# Patient Record
Sex: MALE | Race: BLACK OR AFRICAN AMERICAN | NOT HISPANIC OR LATINO
[De-identification: names, ages, dates, MRNs, and addresses within clinical notes are randomized per-mention and may not be internally consistent; named-entity substitution may affect disease eponyms.]

---

## 2017-04-05 ENCOUNTER — APPOINTMENT (OUTPATIENT)
Dept: ELECTROPHYSIOLOGY | Facility: CLINIC | Age: 62
End: 2017-04-05

## 2017-04-19 ENCOUNTER — APPOINTMENT (OUTPATIENT)
Dept: ELECTROPHYSIOLOGY | Facility: CLINIC | Age: 62
End: 2017-04-19

## 2017-04-19 VITALS
SYSTOLIC BLOOD PRESSURE: 154 MMHG | DIASTOLIC BLOOD PRESSURE: 79 MMHG | OXYGEN SATURATION: 97 % | WEIGHT: 169 LBS | BODY MASS INDEX: 24.96 KG/M2 | HEART RATE: 64 BPM

## 2017-05-04 ENCOUNTER — MEDICATION RENEWAL (OUTPATIENT)
Age: 62
End: 2017-05-04

## 2017-05-31 ENCOUNTER — APPOINTMENT (OUTPATIENT)
Dept: ELECTROPHYSIOLOGY | Facility: CLINIC | Age: 62
End: 2017-05-31

## 2017-08-16 ENCOUNTER — APPOINTMENT (OUTPATIENT)
Dept: ELECTROPHYSIOLOGY | Facility: CLINIC | Age: 62
End: 2017-08-16
Payer: COMMERCIAL

## 2017-08-16 VITALS
DIASTOLIC BLOOD PRESSURE: 81 MMHG | WEIGHT: 184 LBS | HEIGHT: 69 IN | SYSTOLIC BLOOD PRESSURE: 159 MMHG | BODY MASS INDEX: 27.25 KG/M2 | OXYGEN SATURATION: 99 % | HEART RATE: 85 BPM

## 2017-08-16 PROCEDURE — 99215 OFFICE O/P EST HI 40 MIN: CPT | Mod: 25

## 2017-08-16 PROCEDURE — 93000 ELECTROCARDIOGRAM COMPLETE: CPT

## 2017-10-02 ENCOUNTER — APPOINTMENT (OUTPATIENT)
Dept: UROLOGY | Facility: CLINIC | Age: 62
End: 2017-10-02

## 2017-10-02 ENCOUNTER — APPOINTMENT (OUTPATIENT)
Dept: UROLOGY | Facility: CLINIC | Age: 62
End: 2017-10-02
Payer: COMMERCIAL

## 2017-10-02 DIAGNOSIS — N52.9 MALE ERECTILE DYSFUNCTION, UNSPECIFIED: ICD-10-CM

## 2017-10-02 DIAGNOSIS — N43.3 HYDROCELE, UNSPECIFIED: ICD-10-CM

## 2017-10-02 DIAGNOSIS — N39.43 POST-VOID DRIBBLING: ICD-10-CM

## 2017-10-02 PROCEDURE — 99204 OFFICE O/P NEW MOD 45 MIN: CPT

## 2017-10-03 LAB
APPEARANCE: CLEAR
BACTERIA: NEGATIVE
BILIRUBIN URINE: NEGATIVE
BLOOD URINE: NEGATIVE
COLOR: YELLOW
GLUCOSE QUALITATIVE U: NORMAL MG/DL
HYALINE CASTS: 2 /LPF
KETONES URINE: NEGATIVE
LEUKOCYTE ESTERASE URINE: NEGATIVE
MICROSCOPIC-UA: NORMAL
NITRITE URINE: NEGATIVE
PH URINE: 5.5
PROTEIN URINE: NEGATIVE MG/DL
RED BLOOD CELLS URINE: 4 /HPF
SPECIFIC GRAVITY URINE: 1.02
SQUAMOUS EPITHELIAL CELLS: 0 /HPF
UROBILINOGEN URINE: NORMAL MG/DL
WHITE BLOOD CELLS URINE: 0 /HPF

## 2017-10-18 ENCOUNTER — APPOINTMENT (OUTPATIENT)
Dept: ELECTROPHYSIOLOGY | Facility: CLINIC | Age: 62
End: 2017-10-18
Payer: COMMERCIAL

## 2017-10-18 VITALS
HEIGHT: 69 IN | HEART RATE: 67 BPM | DIASTOLIC BLOOD PRESSURE: 60 MMHG | SYSTOLIC BLOOD PRESSURE: 120 MMHG | OXYGEN SATURATION: 99 % | BODY MASS INDEX: 28.73 KG/M2 | WEIGHT: 194 LBS

## 2017-10-18 PROCEDURE — 93000 ELECTROCARDIOGRAM COMPLETE: CPT

## 2017-10-18 PROCEDURE — 99215 OFFICE O/P EST HI 40 MIN: CPT

## 2018-01-24 ENCOUNTER — APPOINTMENT (OUTPATIENT)
Dept: ELECTROPHYSIOLOGY | Facility: CLINIC | Age: 63
End: 2018-01-24

## 2018-01-27 ENCOUNTER — MEDICATION RENEWAL (OUTPATIENT)
Age: 63
End: 2018-01-27

## 2018-05-19 ENCOUNTER — EMERGENCY (EMERGENCY)
Facility: HOSPITAL | Age: 63
LOS: 1 days | Discharge: HOME | End: 2018-05-19
Attending: EMERGENCY MEDICINE
Payer: COMMERCIAL

## 2018-05-19 VITALS
OXYGEN SATURATION: 100 % | HEART RATE: 64 BPM | DIASTOLIC BLOOD PRESSURE: 70 MMHG | SYSTOLIC BLOOD PRESSURE: 152 MMHG | RESPIRATION RATE: 16 BRPM | TEMPERATURE: 98 F

## 2018-05-19 VITALS — HEIGHT: 69 IN | WEIGHT: 182.1 LBS

## 2018-05-19 LAB
ALBUMIN SERPL ELPH-MCNC: 4.2 G/DL — SIGNIFICANT CHANGE UP (ref 3.3–5.2)
ALP SERPL-CCNC: 89 U/L — SIGNIFICANT CHANGE UP (ref 40–120)
ALT FLD-CCNC: 26 U/L — SIGNIFICANT CHANGE UP
ANION GAP SERPL CALC-SCNC: 11 MMOL/L — SIGNIFICANT CHANGE UP (ref 5–17)
APTT BLD: 33 SEC — SIGNIFICANT CHANGE UP (ref 27.5–37.4)
AST SERPL-CCNC: 19 U/L — SIGNIFICANT CHANGE UP
BASOPHILS # BLD AUTO: 0 K/UL — SIGNIFICANT CHANGE UP (ref 0–0.2)
BASOPHILS NFR BLD AUTO: 0.2 % — SIGNIFICANT CHANGE UP (ref 0–2)
BILIRUB SERPL-MCNC: 0.3 MG/DL — LOW (ref 0.4–2)
BUN SERPL-MCNC: 14 MG/DL — SIGNIFICANT CHANGE UP (ref 8–20)
CALCIUM SERPL-MCNC: 9.5 MG/DL — SIGNIFICANT CHANGE UP (ref 8.6–10.2)
CHLORIDE SERPL-SCNC: 100 MMOL/L — SIGNIFICANT CHANGE UP (ref 98–107)
CO2 SERPL-SCNC: 28 MMOL/L — SIGNIFICANT CHANGE UP (ref 22–29)
CREAT SERPL-MCNC: 1.03 MG/DL — SIGNIFICANT CHANGE UP (ref 0.5–1.3)
EOSINOPHIL # BLD AUTO: 0 K/UL — SIGNIFICANT CHANGE UP (ref 0–0.5)
EOSINOPHIL NFR BLD AUTO: 0.3 % — SIGNIFICANT CHANGE UP (ref 0–5)
GLUCOSE SERPL-MCNC: 97 MG/DL — SIGNIFICANT CHANGE UP (ref 70–115)
HCT VFR BLD CALC: 45.6 % — SIGNIFICANT CHANGE UP (ref 42–52)
HGB BLD-MCNC: 14.8 G/DL — SIGNIFICANT CHANGE UP (ref 14–18)
INR BLD: 1.02 RATIO — SIGNIFICANT CHANGE UP (ref 0.88–1.16)
LYMPHOCYTES # BLD AUTO: 1.7 K/UL — SIGNIFICANT CHANGE UP (ref 1–4.8)
LYMPHOCYTES # BLD AUTO: 27.8 % — SIGNIFICANT CHANGE UP (ref 20–55)
MCHC RBC-ENTMCNC: 28.6 PG — SIGNIFICANT CHANGE UP (ref 27–31)
MCHC RBC-ENTMCNC: 32.5 G/DL — SIGNIFICANT CHANGE UP (ref 32–36)
MCV RBC AUTO: 88 FL — SIGNIFICANT CHANGE UP (ref 80–94)
MONOCYTES # BLD AUTO: 0.5 K/UL — SIGNIFICANT CHANGE UP (ref 0–0.8)
MONOCYTES NFR BLD AUTO: 8.4 % — SIGNIFICANT CHANGE UP (ref 3–10)
NEUTROPHILS # BLD AUTO: 3.9 K/UL — SIGNIFICANT CHANGE UP (ref 1.8–8)
NEUTROPHILS NFR BLD AUTO: 63.1 % — SIGNIFICANT CHANGE UP (ref 37–73)
NT-PROBNP SERPL-SCNC: 29 PG/ML — SIGNIFICANT CHANGE UP (ref 0–300)
PLATELET # BLD AUTO: 193 K/UL — SIGNIFICANT CHANGE UP (ref 150–400)
POTASSIUM SERPL-MCNC: 4.8 MMOL/L — SIGNIFICANT CHANGE UP (ref 3.5–5.3)
POTASSIUM SERPL-SCNC: 4.8 MMOL/L — SIGNIFICANT CHANGE UP (ref 3.5–5.3)
PROT SERPL-MCNC: 7.2 G/DL — SIGNIFICANT CHANGE UP (ref 6.6–8.7)
PROTHROM AB SERPL-ACNC: 11.2 SEC — SIGNIFICANT CHANGE UP (ref 9.8–12.7)
RBC # BLD: 5.18 M/UL — SIGNIFICANT CHANGE UP (ref 4.6–6.2)
RBC # FLD: 13.4 % — SIGNIFICANT CHANGE UP (ref 11–15.6)
SODIUM SERPL-SCNC: 139 MMOL/L — SIGNIFICANT CHANGE UP (ref 135–145)
TROPONIN T SERPL-MCNC: <0.01 NG/ML — SIGNIFICANT CHANGE UP (ref 0–0.06)
WBC # BLD: 6.2 K/UL — SIGNIFICANT CHANGE UP (ref 4.8–10.8)
WBC # FLD AUTO: 6.2 K/UL — SIGNIFICANT CHANGE UP (ref 4.8–10.8)

## 2018-05-19 PROCEDURE — 85730 THROMBOPLASTIN TIME PARTIAL: CPT

## 2018-05-19 PROCEDURE — 84443 ASSAY THYROID STIM HORMONE: CPT

## 2018-05-19 PROCEDURE — 85610 PROTHROMBIN TIME: CPT

## 2018-05-19 PROCEDURE — 84484 ASSAY OF TROPONIN QUANT: CPT

## 2018-05-19 PROCEDURE — 83880 ASSAY OF NATRIURETIC PEPTIDE: CPT

## 2018-05-19 PROCEDURE — 85027 COMPLETE CBC AUTOMATED: CPT

## 2018-05-19 PROCEDURE — 93005 ELECTROCARDIOGRAM TRACING: CPT

## 2018-05-19 PROCEDURE — 99283 EMERGENCY DEPT VISIT LOW MDM: CPT

## 2018-05-19 PROCEDURE — 71046 X-RAY EXAM CHEST 2 VIEWS: CPT

## 2018-05-19 PROCEDURE — 93010 ELECTROCARDIOGRAM REPORT: CPT

## 2018-05-19 PROCEDURE — 80053 COMPREHEN METABOLIC PANEL: CPT

## 2018-05-19 PROCEDURE — 71046 X-RAY EXAM CHEST 2 VIEWS: CPT | Mod: 26

## 2018-05-19 PROCEDURE — 99285 EMERGENCY DEPT VISIT HI MDM: CPT

## 2018-05-19 PROCEDURE — 36415 COLL VENOUS BLD VENIPUNCTURE: CPT

## 2018-05-19 NOTE — ED ADULT NURSE REASSESSMENT NOTE - NS ED NURSE REASSESS COMMENT FT1
No distress present upon D/C. Denies CP, SOB, nausea, vomiting, or palpitations. Respirations even and unlabored. No distress present. Will follow up with Dr. Bravo in office this week.

## 2018-05-19 NOTE — ED ADULT NURSE NOTE - CHPI ED SYMPTOMS NEG
no vomiting/no chest pain/no nausea/no syncope/no diaphoresis/no cough/no fever/no dizziness/no back pain/no chills

## 2018-05-19 NOTE — ED STATDOCS - PROGRESS NOTE DETAILS
Pt. present to ED with 2 days of palpitations and shortness of breath. Pt. denies chest pain. Pt. has been dealing with this palpitations for the past few years.  Pt. will be moved to the main room for evaluation.

## 2018-05-19 NOTE — ED PROVIDER NOTE - OBJECTIVE STATEMENT
64 y/o M pt presents to ED c/o intermittent palpitations and SOB  for the past 2 days. Today he notices symptoms are more frequent. Similar episodes in the past, seen by Card and recommend to see Dr. Page prescribed Metroprolol. No increase in caffeine. Denies  nausea, vomiting, diaphoresis, chest pain. No further complaints at this time.

## 2018-05-19 NOTE — ED ADULT NURSE NOTE - OBJECTIVE STATEMENT
C/O palpitations with SOB. States is being followed by Dr. Bravo, and electrophysiologist and is currently on metoprolol for palpitations, but has never had arrythmia caught on monitor.

## 2018-05-30 ENCOUNTER — APPOINTMENT (OUTPATIENT)
Dept: ELECTROPHYSIOLOGY | Facility: CLINIC | Age: 63
End: 2018-05-30
Payer: COMMERCIAL

## 2018-05-30 VITALS
HEART RATE: 69 BPM | WEIGHT: 187 LBS | DIASTOLIC BLOOD PRESSURE: 71 MMHG | SYSTOLIC BLOOD PRESSURE: 117 MMHG | OXYGEN SATURATION: 100 % | BODY MASS INDEX: 27.62 KG/M2

## 2018-05-30 PROCEDURE — 99214 OFFICE O/P EST MOD 30 MIN: CPT

## 2018-09-26 PROBLEM — E78.00 PURE HYPERCHOLESTEROLEMIA, UNSPECIFIED: Chronic | Status: ACTIVE | Noted: 2018-05-19

## 2018-09-26 PROBLEM — K21.9 GASTRO-ESOPHAGEAL REFLUX DISEASE WITHOUT ESOPHAGITIS: Chronic | Status: ACTIVE | Noted: 2018-05-19

## 2018-09-26 PROBLEM — R00.2 PALPITATIONS: Chronic | Status: ACTIVE | Noted: 2018-05-19

## 2018-10-06 ENCOUNTER — MOBILE ON CALL (OUTPATIENT)
Age: 63
End: 2018-10-06

## 2018-10-08 ENCOUNTER — APPOINTMENT (OUTPATIENT)
Dept: ORTHOPEDIC SURGERY | Facility: CLINIC | Age: 63
End: 2018-10-08
Payer: COMMERCIAL

## 2018-10-17 ENCOUNTER — APPOINTMENT (OUTPATIENT)
Dept: ELECTROPHYSIOLOGY | Facility: CLINIC | Age: 63
End: 2018-10-17
Payer: COMMERCIAL

## 2018-10-17 ENCOUNTER — APPOINTMENT (OUTPATIENT)
Dept: ORTHOPEDIC SURGERY | Facility: CLINIC | Age: 63
End: 2018-10-17
Payer: COMMERCIAL

## 2018-10-17 VITALS
HEART RATE: 66 BPM | OXYGEN SATURATION: 99 % | HEIGHT: 69 IN | WEIGHT: 195 LBS | SYSTOLIC BLOOD PRESSURE: 118 MMHG | BODY MASS INDEX: 28.88 KG/M2 | DIASTOLIC BLOOD PRESSURE: 66 MMHG

## 2018-10-17 PROCEDURE — 93000 ELECTROCARDIOGRAM COMPLETE: CPT

## 2018-10-17 PROCEDURE — 99214 OFFICE O/P EST MOD 30 MIN: CPT

## 2018-10-23 ENCOUNTER — APPOINTMENT (OUTPATIENT)
Dept: ORTHOPEDIC SURGERY | Facility: CLINIC | Age: 63
End: 2018-10-23
Payer: COMMERCIAL

## 2018-10-23 VITALS
HEIGHT: 69 IN | WEIGHT: 187 LBS | DIASTOLIC BLOOD PRESSURE: 85 MMHG | HEART RATE: 76 BPM | SYSTOLIC BLOOD PRESSURE: 150 MMHG | BODY MASS INDEX: 27.7 KG/M2

## 2018-10-23 DIAGNOSIS — M17.10 UNILATERAL PRIMARY OSTEOARTHRITIS, UNSPECIFIED KNEE: ICD-10-CM

## 2018-10-23 DIAGNOSIS — M22.2X1 PATELLOFEMORAL DISORDERS, RIGHT KNEE: ICD-10-CM

## 2018-10-23 PROCEDURE — 99203 OFFICE O/P NEW LOW 30 MIN: CPT

## 2018-10-23 PROCEDURE — 73564 X-RAY EXAM KNEE 4 OR MORE: CPT | Mod: RT

## 2019-02-28 ENCOUNTER — OUTPATIENT (OUTPATIENT)
Dept: OUTPATIENT SERVICES | Facility: HOSPITAL | Age: 64
LOS: 1 days | End: 2019-02-28
Payer: COMMERCIAL

## 2019-02-28 DIAGNOSIS — R07.9 CHEST PAIN, UNSPECIFIED: ICD-10-CM

## 2019-02-28 DIAGNOSIS — I47.1 SUPRAVENTRICULAR TACHYCARDIA: ICD-10-CM

## 2019-02-28 PROCEDURE — 93018 CV STRESS TEST I&R ONLY: CPT

## 2019-02-28 PROCEDURE — 93017 CV STRESS TEST TRACING ONLY: CPT

## 2019-02-28 PROCEDURE — 93016 CV STRESS TEST SUPVJ ONLY: CPT

## 2019-03-05 ENCOUNTER — APPOINTMENT (OUTPATIENT)
Dept: ELECTROPHYSIOLOGY | Facility: CLINIC | Age: 64
End: 2019-03-05
Payer: COMMERCIAL

## 2019-03-05 VITALS
DIASTOLIC BLOOD PRESSURE: 71 MMHG | BODY MASS INDEX: 28.88 KG/M2 | HEIGHT: 69 IN | SYSTOLIC BLOOD PRESSURE: 154 MMHG | WEIGHT: 195 LBS | HEART RATE: 76 BPM | OXYGEN SATURATION: 98 %

## 2019-03-05 PROCEDURE — 93000 ELECTROCARDIOGRAM COMPLETE: CPT

## 2019-03-05 PROCEDURE — 99215 OFFICE O/P EST HI 40 MIN: CPT | Mod: Q5,25

## 2019-03-05 NOTE — DISCUSSION/SUMMARY
[FreeTextEntry1] : 61 yo AAM w/recurrent palpitations, elusive to diagnosis via noninvasive monitoring. \par \par Explained long term management options of continued observation on Toprol, addition of AAD, and/or EPS +/- RFA. Appropriate strategy dependent on underlying arrhythmia detected. \par \par Recommendations:\par 1. Schedule ILR implantation\par 2. Continue Toprol XL 50 mg daily\par 3. RTC 2 weeks post-implant for device/wound check

## 2019-03-05 NOTE — CARDIOLOGY SUMMARY
[No Ischemia] : no Ischemia [No Exercise Ind Arr] : no exercise induced arrhythmias [No Symptoms] : no Symptoms [___] : [unfilled] [___] : [unfilled]

## 2019-03-05 NOTE — PHYSICAL EXAM
[General Appearance - Well Developed] : well developed [Normal Appearance] : normal appearance [Well Groomed] : well groomed [General Appearance - Well Nourished] : well nourished [No Deformities] : no deformities [General Appearance - In No Acute Distress] : no acute distress [Normal Conjunctiva] : the conjunctiva exhibited no abnormalities [Normal Oral Mucosa] : normal oral mucosa [Normal Oropharynx] : normal oropharynx [Normal Jugular Venous V Waves Present] : normal jugular venous V waves present [Respiration, Rhythm And Depth] : normal respiratory rhythm and effort [Exaggerated Use Of Accessory Muscles For Inspiration] : no accessory muscle use [Auscultation Breath Sounds / Voice Sounds] : lungs were clear to auscultation bilaterally [Heart Rate And Rhythm] : heart rate and rhythm were normal [Heart Sounds] : normal S1 and S2 [Murmurs] : no murmurs present [Arterial Pulses Normal] : the arterial pulses were normal [Edema] : no peripheral edema present [Abdomen Soft] : soft [Abdomen Tenderness] : non-tender [Abnormal Walk] : normal gait [Nail Clubbing] : no clubbing of the fingernails [Cyanosis, Localized] : no localized cyanosis [Skin Color & Pigmentation] : normal skin color and pigmentation [Skin Turgor] : normal skin turgor [] : no rash [Oriented To Time, Place, And Person] : oriented to person, place, and time [Impaired Insight] : insight and judgment were intact [Affect] : the affect was normal [Mood] : the mood was normal [No Anxiety] : not feeling anxious

## 2019-03-05 NOTE — REASON FOR VISIT
[Follow-Up - Clinic] : a clinic follow-up of [Palpitations] : palpitations [Spouse] : spouse [FreeTextEntry1] : Referring: former pt of Dr. Bravo (by way of Dr. Sheehan)

## 2019-03-05 NOTE — HISTORY OF PRESENT ILLNESS
[FreeTextEntry1] : Mr. Kumar is a very pleasant 63 yo AAM [facility]  who was previously followed by Dr. Bravo for recurrent palpitations. Last seen Oct 2018. Prior to that he had been treated for an episode of hyperthyroidism with methimazole and it was self-limited. \par \par Since his last visit, patient as recently as this past weekend had a recurrent bout of palpitations. He states he's worn a 14 day monitor with no luck in catching his symptoms, and every time he's gone to the ER in the past they were not able to diagnose it due to his symptoms spontaneously terminating prior to EKG hook-up. \par \par Outside of his palpitations, though, he denies any CP, IRVIN, orthopnea/PND/LE edema, LH/dizziness/syncope, or n/v/diaphoresis.

## 2019-03-12 ENCOUNTER — OUTPATIENT (OUTPATIENT)
Dept: OUTPATIENT SERVICES | Facility: HOSPITAL | Age: 64
LOS: 1 days | End: 2019-03-12
Payer: COMMERCIAL

## 2019-03-12 DIAGNOSIS — R94.31 ABNORMAL ELECTROCARDIOGRAM [ECG] [EKG]: ICD-10-CM

## 2019-03-12 PROCEDURE — 93016 CV STRESS TEST SUPVJ ONLY: CPT

## 2019-03-12 PROCEDURE — A9500: CPT

## 2019-03-12 PROCEDURE — 78452 HT MUSCLE IMAGE SPECT MULT: CPT | Mod: 26

## 2019-03-12 PROCEDURE — 93017 CV STRESS TEST TRACING ONLY: CPT

## 2019-03-12 PROCEDURE — 93018 CV STRESS TEST I&R ONLY: CPT

## 2019-03-12 PROCEDURE — 78452 HT MUSCLE IMAGE SPECT MULT: CPT

## 2019-03-20 ENCOUNTER — TRANSCRIPTION ENCOUNTER (OUTPATIENT)
Age: 64
End: 2019-03-20

## 2019-03-20 ENCOUNTER — OUTPATIENT (OUTPATIENT)
Dept: OUTPATIENT SERVICES | Facility: HOSPITAL | Age: 64
LOS: 1 days | End: 2019-03-20
Payer: COMMERCIAL

## 2019-03-20 VITALS
WEIGHT: 195.11 LBS | RESPIRATION RATE: 16 BRPM | TEMPERATURE: 99 F | DIASTOLIC BLOOD PRESSURE: 67 MMHG | HEIGHT: 69 IN | HEART RATE: 74 BPM | SYSTOLIC BLOOD PRESSURE: 137 MMHG | OXYGEN SATURATION: 100 %

## 2019-03-20 VITALS
SYSTOLIC BLOOD PRESSURE: 137 MMHG | WEIGHT: 195.33 LBS | RESPIRATION RATE: 18 BRPM | DIASTOLIC BLOOD PRESSURE: 67 MMHG | OXYGEN SATURATION: 100 % | HEIGHT: 69 IN | TEMPERATURE: 98 F | HEART RATE: 73 BPM

## 2019-03-20 DIAGNOSIS — R00.2 PALPITATIONS: ICD-10-CM

## 2019-03-20 DIAGNOSIS — Z98.890 OTHER SPECIFIED POSTPROCEDURAL STATES: Chronic | ICD-10-CM

## 2019-03-20 PROCEDURE — C1764: CPT

## 2019-03-20 PROCEDURE — 33285 INSJ SUBQ CAR RHYTHM MNTR: CPT

## 2019-03-20 RX ORDER — OMEGA-3 ACID ETHYL ESTERS 1 G
0 CAPSULE ORAL
Qty: 0 | Refills: 0 | COMMUNITY

## 2019-03-20 NOTE — H&P PST ADULT - NSICDXPASTMEDICALHX_GEN_ALL_CORE_FT
PAST MEDICAL HISTORY:  GERD (gastroesophageal reflux disease)     High cholesterol     Hypertension     Palpitations

## 2019-03-20 NOTE — H&P PST ADULT - NSANTHOSAYNRD_GEN_A_CORE
No. IDA screening performed.  STOP BANG Legend: 0-2 = LOW Risk; 3-4 = INTERMEDIATE Risk; 5-8 = HIGH Risk

## 2019-03-20 NOTE — DISCHARGE NOTE NURSING/CASE MANAGEMENT/SOCIAL WORK - NSDCDPATPORTLINK_GEN_ALL_CORE
You can access the [facility] Patient Portal, offered by [facility], by registering with the following website: [email or web address]

## 2019-03-20 NOTE — DISCHARGE NOTE PROVIDER - NSDCCPTREATMENT_GEN_ALL_CORE_FT
PRINCIPAL PROCEDURE  Procedure: Insertion, loop recorder  Findings and Treatment: Loop Recorder Incision Care:     - Do not touch the incision until it is completely healed.   - There is Dermabond (skin glue) on your incision, which will start to flake off on its own over the next 2-3 weeks. Do not pick at or peel off the Dermabond.   - Do not apply soaps, creams, lotions, ointments or powders to the incision until it is completely healed.  - You should call the doctor if you notice redness, drainage, swelling, increased tenderness, hot sensation around the  incision, bleeding or incision edges pulling apart.

## 2019-03-20 NOTE — H&P PST ADULT - HISTORY OF PRESENT ILLNESS
63 yo male with history of hyperlipidemia, HTN and GERD who has had episodes of recurrent palpitations.  He presents today for ILR implant for continued monitoring to determine underlying arrhythmia.  Pt denies any complaints today.        Nuclear stress: 3/12/19,Conclusion:  The left ventricle was normal in size. Normal myocardial  perfusion scan, with no evidence of infarction or  inducible ischemia.  Confirmed on  3/12/2019 - 16:41:19 by Alvino Sheehan MD   Cardiology Fellow: Nikhil MARVIN

## 2019-03-20 NOTE — DISCHARGE NOTE PROVIDER - CARE PROVIDER_API CALL
Dwight Nick)  Cardiovascular Disease; Internal Medicine  [address] [place] [address]  Phone: [phone]  Fax: [phone]  Follow Up Time:

## 2019-03-20 NOTE — DISCHARGE NOTE PROVIDER - HOSPITAL COURSE
61 yo male with history of hyperlipidemia, HTN and GERD who has had episodes of recurrent palpitations.  He presents today for ILR implant for continued monitoring to determine underlying arrhythmia.  The patient was observed per post procedure protocol, then discharged home with a plan for outpatient follow up.

## 2019-03-20 NOTE — H&P PST ADULT - ASSESSMENT
63 yo male with history of hyperlipidemia, HTN and GERD who has had episodes of recurrent palpitations.  He presents today for ILR implant for continued monitoring to determine underlying arrhythmia.

## 2019-03-26 PROBLEM — I10 ESSENTIAL (PRIMARY) HYPERTENSION: Chronic | Status: ACTIVE | Noted: 2019-03-20

## 2019-04-10 ENCOUNTER — APPOINTMENT (OUTPATIENT)
Dept: ELECTROPHYSIOLOGY | Facility: CLINIC | Age: 64
End: 2019-04-10

## 2019-04-10 ENCOUNTER — APPOINTMENT (OUTPATIENT)
Dept: ELECTROPHYSIOLOGY | Facility: CLINIC | Age: 64
End: 2019-04-10
Payer: COMMERCIAL

## 2019-04-10 VITALS — DIASTOLIC BLOOD PRESSURE: 68 MMHG | SYSTOLIC BLOOD PRESSURE: 156 MMHG | OXYGEN SATURATION: 98 % | HEART RATE: 69 BPM

## 2019-04-10 VITALS — DIASTOLIC BLOOD PRESSURE: 70 MMHG | SYSTOLIC BLOOD PRESSURE: 143 MMHG

## 2019-04-10 PROCEDURE — 99213 OFFICE O/P EST LOW 20 MIN: CPT | Mod: Q5

## 2019-04-10 NOTE — PHYSICAL EXAM
[Clean] : clean [Dry] : dry [Healing Well] : healing well [Palpable Crepitus] : no palpable crepitus [Bleeding] : no active bleeding [Foul Odor] : no foul smell [Purulent Drainage] : no purulent drainage [Serosanguineous Drainage] : no serosanquineous drainage [Serous Drainage] : no serous drainage [Erythema] : not erythematous [Warm] : not warm [Tender] : not tender [Indurated] : not indurated [Fluctuant] : not fluctuant [FreeTextEntry1] : parasternal

## 2019-04-10 NOTE — HISTORY OF PRESENT ILLNESS
[None] : The patient complains of no symptoms [de-identified] : This is a 64-year-old gentleman with history of HTN, GERD, cholesterolemia and hyperthyroidism. Patient now presents s/p Loop Recorder Implant for long term monitoring of arrhythmias in the setting \par of palpitations.

## 2019-04-10 NOTE — DISCUSSION/SUMMARY
[FreeTextEntry1] : 65 yo male with history of hyperlipidemia, HTN and GERD who has had episodes of recurrent palpitations.  He presents today s/p ILR implant for continued monitoring to determine underlying arrhythmia.  \par -Parasternal implant site clean, dry and healing well.\par -ILR interrogation with NO events.\par -Patient and spouse educated on ILR and remote monitoring and verbalize an understanding.\par -Continue remote monitoring. EP follow up in 6 months or sooner if needed.\par \par Evette Propper ANP-C

## 2019-04-10 NOTE — PROCEDURE
[Medtronic] : Medtronic [Normal] : The battery status is normal. [None] : none [de-identified] : REVEAL LINQ [de-identified] : [id number] [de-identified] : 3/20/18 [de-identified] : NO EVENTS

## 2019-04-19 ENCOUNTER — APPOINTMENT (OUTPATIENT)
Dept: ELECTROPHYSIOLOGY | Facility: CLINIC | Age: 64
End: 2019-04-19
Payer: COMMERCIAL

## 2019-04-19 PROCEDURE — 93299: CPT | Mod: Q5

## 2019-04-19 PROCEDURE — 93298 REM INTERROG DEV EVAL SCRMS: CPT | Mod: Q5

## 2019-05-20 ENCOUNTER — APPOINTMENT (OUTPATIENT)
Dept: ELECTROPHYSIOLOGY | Facility: CLINIC | Age: 64
End: 2019-05-20
Payer: COMMERCIAL

## 2019-05-20 PROCEDURE — 93299: CPT

## 2019-05-20 PROCEDURE — 93298 REM INTERROG DEV EVAL SCRMS: CPT

## 2019-06-24 ENCOUNTER — APPOINTMENT (OUTPATIENT)
Dept: ELECTROPHYSIOLOGY | Facility: CLINIC | Age: 64
End: 2019-06-24
Payer: COMMERCIAL

## 2019-06-24 PROCEDURE — 93299: CPT

## 2019-06-24 PROCEDURE — 93298 REM INTERROG DEV EVAL SCRMS: CPT

## 2019-07-23 ENCOUNTER — APPOINTMENT (OUTPATIENT)
Dept: ELECTROPHYSIOLOGY | Facility: CLINIC | Age: 64
End: 2019-07-23
Payer: COMMERCIAL

## 2019-07-23 VITALS
HEIGHT: 69 IN | OXYGEN SATURATION: 99 % | WEIGHT: 190 LBS | DIASTOLIC BLOOD PRESSURE: 77 MMHG | HEART RATE: 63 BPM | SYSTOLIC BLOOD PRESSURE: 145 MMHG | BODY MASS INDEX: 28.14 KG/M2

## 2019-07-23 PROCEDURE — 93000 ELECTROCARDIOGRAM COMPLETE: CPT | Mod: 59

## 2019-07-23 PROCEDURE — 99214 OFFICE O/P EST MOD 30 MIN: CPT | Mod: 25

## 2019-07-23 PROCEDURE — 93290 INTERROG DEV EVAL ICPMS IP: CPT

## 2019-07-23 NOTE — CARDIOLOGY SUMMARY
[___] : [unfilled] [No Exercise Ind Arr] : no exercise induced arrhythmias [No Ischemia] : no Ischemia [No Symptoms] : no Symptoms

## 2019-07-23 NOTE — DISCUSSION/SUMMARY
[FreeTextEntry1] : 61 yo AAM w/recurrent palpitations, with one episode of nonsustained SVT correlating with symptoms. \par \par His symptom burden is currently out of proportion to what's been detected on ILR monitoring thus far. He does have e/o SVT, but unclear if this is his etiology for all his symptomatic episodes. \par \par Recommendations:\par 1. Reprogrammed ILR for more sensitive detection of tachy events (lowered duration to 12 beats) and instructed patient to use symptom activator button\par 2. Continue Toprol XL 50 mg daily\par 3. RTC 2 weeks

## 2019-07-23 NOTE — ASSESSMENT
[FreeTextEntry1] : ILR interrogation 7/23/19:\par 1 episode of SVT around 360 ms for ~10 s\par Otherwise no events observed

## 2019-07-23 NOTE — HISTORY OF PRESENT ILLNESS
[FreeTextEntry1] : Mr. Kumar is a very pleasant 63 yo AAM [facility]  who was previously followed by Dr. Bravo for recurrent palpitations. Prior to that he had been treated for an episode of hyperthyroidism with methimazole and it was self-limited. He is now s/p ILR implantation. \par \par Since implantation, he states he gets weekly palpitations that occur at random times of the day. They last at most a few seconds. Outside of his palpitations, he continues to deny any CP, IRVIN, orthopnea/PND/LE edema, LH/dizziness/syncope, or n/v/diaphoresis.

## 2019-07-26 ENCOUNTER — APPOINTMENT (OUTPATIENT)
Dept: ELECTROPHYSIOLOGY | Facility: CLINIC | Age: 64
End: 2019-07-26
Payer: COMMERCIAL

## 2019-07-26 PROCEDURE — 93298 REM INTERROG DEV EVAL SCRMS: CPT

## 2019-07-26 PROCEDURE — 93299: CPT

## 2019-08-13 ENCOUNTER — APPOINTMENT (OUTPATIENT)
Dept: ELECTROPHYSIOLOGY | Facility: CLINIC | Age: 64
End: 2019-08-13
Payer: COMMERCIAL

## 2019-08-13 VITALS
DIASTOLIC BLOOD PRESSURE: 80 MMHG | SYSTOLIC BLOOD PRESSURE: 150 MMHG | HEART RATE: 74 BPM | WEIGHT: 194 LBS | HEIGHT: 69 IN | OXYGEN SATURATION: 99 % | BODY MASS INDEX: 28.73 KG/M2

## 2019-08-13 PROCEDURE — 99214 OFFICE O/P EST MOD 30 MIN: CPT | Mod: 25

## 2019-08-13 PROCEDURE — 93000 ELECTROCARDIOGRAM COMPLETE: CPT

## 2019-08-13 RX ORDER — METOPROLOL SUCCINATE 50 MG/1
50 TABLET, EXTENDED RELEASE ORAL DAILY
Qty: 3 | Refills: 3 | Status: DISCONTINUED | COMMUNITY
Start: 1900-01-01 | End: 2019-08-13

## 2019-08-13 NOTE — CARDIOLOGY SUMMARY
[___] : [unfilled] [No Ischemia] : no Ischemia [No Exercise Ind Arr] : no exercise induced arrhythmias [No Symptoms] : no Symptoms

## 2019-08-13 NOTE — PHYSICAL EXAM
[General Appearance - Well Developed] : well developed [Well Groomed] : well groomed [Normal Appearance] : normal appearance [No Deformities] : no deformities [General Appearance - Well Nourished] : well nourished [Normal Conjunctiva] : the conjunctiva exhibited no abnormalities [General Appearance - In No Acute Distress] : no acute distress [Normal Oral Mucosa] : normal oral mucosa [Normal Oropharynx] : normal oropharynx [Normal Jugular Venous V Waves Present] : normal jugular venous V waves present [Respiration, Rhythm And Depth] : normal respiratory rhythm and effort [Exaggerated Use Of Accessory Muscles For Inspiration] : no accessory muscle use [Auscultation Breath Sounds / Voice Sounds] : lungs were clear to auscultation bilaterally [Heart Rate And Rhythm] : heart rate and rhythm were normal [Heart Sounds] : normal S1 and S2 [Murmurs] : no murmurs present [Arterial Pulses Normal] : the arterial pulses were normal [Edema] : no peripheral edema present [Abdomen Soft] : soft [Abdomen Tenderness] : non-tender [Abnormal Walk] : normal gait [Nail Clubbing] : no clubbing of the fingernails [Cyanosis, Localized] : no localized cyanosis [Skin Turgor] : normal skin turgor [Skin Color & Pigmentation] : normal skin color and pigmentation [Oriented To Time, Place, And Person] : oriented to person, place, and time [] : no rash [Affect] : the affect was normal [Impaired Insight] : insight and judgment were intact [Mood] : the mood was normal [No Anxiety] : not feeling anxious

## 2019-08-15 ENCOUNTER — NON-APPOINTMENT (OUTPATIENT)
Age: 64
End: 2019-08-15

## 2019-08-15 NOTE — HISTORY OF PRESENT ILLNESS
[FreeTextEntry1] : Mr. Kumar is a very pleasant 63 yo AAM [facility]  who was previously followed by Dr. Bravo for recurrent palpitations. Prior to that he had been treated for an episode of hyperthyroidism with methimazole and it was self-limited. He is s/p ILR implantation and presents for clinical follow-up.\par  \par He continues to have weekly palpitations that occur at random times of the day. They last at most a few seconds. Outside of his palpitations, he continues to deny any CP, IRVIN, orthopnea/PND/LE edema, LH/dizziness/syncope, or n/v/diaphoresis.

## 2019-08-15 NOTE — DISCUSSION/SUMMARY
[FreeTextEntry1] : 63 yo AAM w/recurrent palpitations but not correlating with any documented tachyarrhythmias. \par \par Pt has been c/o erectile dysfunction likely 2/2 his beta blockade. His symptoms currently are miniscule and not correlating with SVT. Explained can switch to verapamil to attempt symptom palliation and this may help alleviated his ED.\par \par Recommendations:\par 1. Will wean beta blocker (half dose x qd 1 week, then qod x 1 week, then d/c)\par 2. Start verapamil 40 mg TID x 2 weeks after BB d/c'd\par 3. RTC 1 month

## 2019-08-30 ENCOUNTER — APPOINTMENT (OUTPATIENT)
Dept: ELECTROPHYSIOLOGY | Facility: CLINIC | Age: 64
End: 2019-08-30
Payer: COMMERCIAL

## 2019-08-30 PROCEDURE — 93298 REM INTERROG DEV EVAL SCRMS: CPT

## 2019-08-30 PROCEDURE — 93299: CPT

## 2019-09-16 ENCOUNTER — RX RENEWAL (OUTPATIENT)
Age: 64
End: 2019-09-16

## 2019-09-24 ENCOUNTER — NON-APPOINTMENT (OUTPATIENT)
Age: 64
End: 2019-09-24

## 2019-09-24 ENCOUNTER — APPOINTMENT (OUTPATIENT)
Dept: ELECTROPHYSIOLOGY | Facility: CLINIC | Age: 64
End: 2019-09-24
Payer: COMMERCIAL

## 2019-09-24 VITALS
HEIGHT: 69 IN | WEIGHT: 192 LBS | BODY MASS INDEX: 28.44 KG/M2 | HEART RATE: 80 BPM | DIASTOLIC BLOOD PRESSURE: 73 MMHG | SYSTOLIC BLOOD PRESSURE: 135 MMHG | OXYGEN SATURATION: 100 %

## 2019-09-24 PROCEDURE — 93000 ELECTROCARDIOGRAM COMPLETE: CPT

## 2019-09-24 PROCEDURE — 99214 OFFICE O/P EST MOD 30 MIN: CPT | Mod: 25

## 2019-09-24 RX ORDER — NAPROXEN 500 MG/1
500 TABLET ORAL
Qty: 60 | Refills: 2 | Status: DISCONTINUED | COMMUNITY
Start: 2018-10-23 | End: 2019-09-24

## 2019-09-24 RX ORDER — TADALAFIL 20 MG/1
20 TABLET, FILM COATED ORAL
Qty: 6 | Refills: 6 | Status: DISCONTINUED | COMMUNITY
Start: 2017-10-02 | End: 2019-09-24

## 2019-09-24 RX ORDER — VERAPAMIL HYDROCHLORIDE 40 MG/1
40 TABLET ORAL 3 TIMES DAILY
Qty: 90 | Refills: 0 | Status: DISCONTINUED | COMMUNITY
Start: 2019-08-13 | End: 2019-09-24

## 2019-09-24 NOTE — DISCUSSION/SUMMARY
[FreeTextEntry1] : Mr. Kumar is a very pleasant 63 yo AAM [facility]  who was previously followed by Dr. Bravo for recurrent palpitations. Prior to that he had been treated for an episode of hyperthyroidism with methimazole and it was self-limited. He is s/p ILR implantation and presents for clinical follow-up.\par  He continues to have palpitations that occur at random times of the day. At last follow up he was started on Verapamil 40mg TID. He is tolerating it well but continues to have episodes of intermittent palpitations.   Outside of his palpitations, he continues to deny any CP, IRVIN, orthopnea/PND/LE edema, LH/dizziness/syncope, or n/v/diaphoresis. \par \par -ILR interrogation with 1 tachy episode c/w artifact. NO additional events.\par -change Verapamil to Verapamil ER 120mg daily\par -ILR reprogrammed with lowered tachy detection.  Patient re-educated on patient activated symptom button to correlate symptoms with arrhythmia. \par -EP follow up in 6 months or sooner if needed.\par \par Evette Propper ANP-C

## 2019-09-24 NOTE — PHYSICAL EXAM
[General Appearance - Well Developed] : well developed [Normal Appearance] : normal appearance [Well Groomed] : well groomed [General Appearance - Well Nourished] : well nourished [No Deformities] : no deformities [General Appearance - In No Acute Distress] : no acute distress [Normal Conjunctiva] : the conjunctiva exhibited no abnormalities [Eyelids - No Xanthelasma] : the eyelids demonstrated no xanthelasmas [No Oral Pallor] : no oral pallor [Normal Oral Mucosa] : normal oral mucosa [No Oral Cyanosis] : no oral cyanosis [Normal Jugular Venous V Waves Present] : normal jugular venous V waves present [Normal Jugular Venous A Waves Present] : normal jugular venous A waves present [No Jugular Venous Victor A Waves] : no jugular venous victor A waves [Respiration, Rhythm And Depth] : normal respiratory rhythm and effort [Exaggerated Use Of Accessory Muscles For Inspiration] : no accessory muscle use [Auscultation Breath Sounds / Voice Sounds] : lungs were clear to auscultation bilaterally [Heart Rate And Rhythm] : heart rate and rhythm were normal [Murmurs] : no murmurs present [Heart Sounds] : normal S1 and S2 [Abdomen Soft] : soft [Abdomen Tenderness] : non-tender [Abnormal Walk] : normal gait [Abdomen Mass (___ Cm)] : no abdominal mass palpated [Gait - Sufficient For Exercise Testing] : the gait was sufficient for exercise testing [Skin Color & Pigmentation] : normal skin color and pigmentation [] : no rash [No Venous Stasis] : no venous stasis [No Skin Ulcers] : no skin ulcer [Skin Lesions] : no skin lesions [No Xanthoma] : no  xanthoma was observed [Affect] : the affect was normal [Mood] : the mood was normal [Oriented To Time, Place, And Person] : oriented to person, place, and time [No Anxiety] : not feeling anxious

## 2019-09-24 NOTE — HISTORY OF PRESENT ILLNESS
[FreeTextEntry1] : Mr. Kumar is a very pleasant 61 yo AAM [facility]  who was previously followed by Dr. Bravo for recurrent palpitations. Prior to that he had been treated for an episode of hyperthyroidism with methimazole and it was self-limited. He is s/p ILR implantation and presents for clinical follow-up.\par  \par He continues to have palpitations that occur at random times of the day. At last follow up he was started on Verapamil 40mg TID. He is tolerating it well but continues to have episodes of intermittent palpitations.   Outside of his palpitations, he continues to deny any CP, IRVIN, orthopnea/PND/LE edema, LH/dizziness/syncope, or n/v/diaphoresis. \par ILR interrogation with 1 tachy episode EGM c/w artifact. NO additional episodes. \par

## 2019-09-30 ENCOUNTER — APPOINTMENT (OUTPATIENT)
Dept: ELECTROPHYSIOLOGY | Facility: CLINIC | Age: 64
End: 2019-09-30
Payer: COMMERCIAL

## 2019-09-30 PROCEDURE — 93298 REM INTERROG DEV EVAL SCRMS: CPT

## 2019-09-30 PROCEDURE — 93299: CPT

## 2019-11-01 ENCOUNTER — APPOINTMENT (OUTPATIENT)
Dept: ELECTROPHYSIOLOGY | Facility: CLINIC | Age: 64
End: 2019-11-01
Payer: COMMERCIAL

## 2019-11-01 PROCEDURE — 93298 REM INTERROG DEV EVAL SCRMS: CPT

## 2019-11-01 PROCEDURE — 93299: CPT

## 2019-11-11 NOTE — DISCHARGE NOTE PROVIDER - INSTRUCTIONS
physical therapy
Choose lean meats and poultry without skin and prepare them without added saturated and trans fat.  Eat fish at least twice a week. Recent research shows that eating oily fish containing omega-3 fatty acids (for example, salmon, trout and herring) may help lower your risk of death from coronary artery disease.  Select fat-free, 1 percent fat and low-fat dairy products.  Cut back on foods containing partially hydrogenated vegetable oils to reduce trans fat in your diet.   To lower cholesterol, reduce saturated fat to no more than 5 to 6 percent of total calories. For someone eating 2,000 calories a day, that’s about 13 grams of saturated fat.  Cut back on beverages and foods with added sugars.  Choose and prepare foods with little or no salt. To lower blood pressure, aim to eat no more than 2,400 milligrams of sodium per day. Reducing daily intake to 1,500 mg is desirable because it can lower blood pressure even further.  If you drink alcohol, drink in moderation. That means one drink per day if you’re a woman and two drinks  per day if you’re a man.  Follow the American Heart Association recommendations when you eat out, and keep an eye on your portion sizes.

## 2019-11-24 ENCOUNTER — EMERGENCY (EMERGENCY)
Facility: HOSPITAL | Age: 64
LOS: 1 days | Discharge: HOME | End: 2019-11-24
Attending: EMERGENCY MEDICINE
Payer: COMMERCIAL

## 2019-11-24 VITALS
RESPIRATION RATE: 20 BRPM | DIASTOLIC BLOOD PRESSURE: 73 MMHG | SYSTOLIC BLOOD PRESSURE: 160 MMHG | TEMPERATURE: 98 F | OXYGEN SATURATION: 98 % | HEART RATE: 83 BPM

## 2019-11-24 VITALS
WEIGHT: 186.95 LBS | OXYGEN SATURATION: 99 % | TEMPERATURE: 98 F | DIASTOLIC BLOOD PRESSURE: 77 MMHG | RESPIRATION RATE: 20 BRPM | HEIGHT: 69 IN | SYSTOLIC BLOOD PRESSURE: 172 MMHG | HEART RATE: 103 BPM

## 2019-11-24 DIAGNOSIS — Z98.890 OTHER SPECIFIED POSTPROCEDURAL STATES: Chronic | ICD-10-CM

## 2019-11-24 LAB
ALBUMIN SERPL ELPH-MCNC: 4.7 G/DL — SIGNIFICANT CHANGE UP (ref 3.3–5.2)
ALP SERPL-CCNC: 118 U/L — SIGNIFICANT CHANGE UP (ref 40–120)
ALT FLD-CCNC: 27 U/L — SIGNIFICANT CHANGE UP
ANION GAP SERPL CALC-SCNC: 12 MMOL/L — SIGNIFICANT CHANGE UP (ref 5–17)
APTT BLD: 35.6 SEC — SIGNIFICANT CHANGE UP (ref 27.5–36.3)
AST SERPL-CCNC: 30 U/L — SIGNIFICANT CHANGE UP
BASOPHILS # BLD AUTO: 0.04 K/UL — SIGNIFICANT CHANGE UP (ref 0–0.2)
BASOPHILS NFR BLD AUTO: 0.6 % — SIGNIFICANT CHANGE UP (ref 0–2)
BILIRUB SERPL-MCNC: 0.3 MG/DL — LOW (ref 0.4–2)
BLD GP AB SCN SERPL QL: SIGNIFICANT CHANGE UP
BUN SERPL-MCNC: 13 MG/DL — SIGNIFICANT CHANGE UP (ref 8–20)
CALCIUM SERPL-MCNC: 9.7 MG/DL — SIGNIFICANT CHANGE UP (ref 8.6–10.2)
CHLORIDE SERPL-SCNC: 98 MMOL/L — SIGNIFICANT CHANGE UP (ref 98–107)
CO2 SERPL-SCNC: 26 MMOL/L — SIGNIFICANT CHANGE UP (ref 22–29)
CREAT SERPL-MCNC: 1.08 MG/DL — SIGNIFICANT CHANGE UP (ref 0.5–1.3)
EOSINOPHIL # BLD AUTO: 0.03 K/UL — SIGNIFICANT CHANGE UP (ref 0–0.5)
EOSINOPHIL NFR BLD AUTO: 0.4 % — SIGNIFICANT CHANGE UP (ref 0–6)
GLUCOSE SERPL-MCNC: 112 MG/DL — SIGNIFICANT CHANGE UP (ref 70–115)
HCT VFR BLD CALC: 49.9 % — SIGNIFICANT CHANGE UP (ref 39–50)
HGB BLD-MCNC: 15.8 G/DL — SIGNIFICANT CHANGE UP (ref 13–17)
IMM GRANULOCYTES NFR BLD AUTO: 0.3 % — SIGNIFICANT CHANGE UP (ref 0–1.5)
INR BLD: 1.08 RATIO — SIGNIFICANT CHANGE UP (ref 0.88–1.16)
LYMPHOCYTES # BLD AUTO: 2.43 K/UL — SIGNIFICANT CHANGE UP (ref 1–3.3)
LYMPHOCYTES # BLD AUTO: 34.5 % — SIGNIFICANT CHANGE UP (ref 13–44)
MCHC RBC-ENTMCNC: 28 PG — SIGNIFICANT CHANGE UP (ref 27–34)
MCHC RBC-ENTMCNC: 31.7 GM/DL — LOW (ref 32–36)
MCV RBC AUTO: 88.5 FL — SIGNIFICANT CHANGE UP (ref 80–100)
MONOCYTES # BLD AUTO: 0.42 K/UL — SIGNIFICANT CHANGE UP (ref 0–0.9)
MONOCYTES NFR BLD AUTO: 6 % — SIGNIFICANT CHANGE UP (ref 2–14)
NEUTROPHILS # BLD AUTO: 4.1 K/UL — SIGNIFICANT CHANGE UP (ref 1.8–7.4)
NEUTROPHILS NFR BLD AUTO: 58.2 % — SIGNIFICANT CHANGE UP (ref 43–77)
PLATELET # BLD AUTO: 244 K/UL — SIGNIFICANT CHANGE UP (ref 150–400)
POTASSIUM SERPL-MCNC: 5.1 MMOL/L — SIGNIFICANT CHANGE UP (ref 3.5–5.3)
POTASSIUM SERPL-SCNC: 5.1 MMOL/L — SIGNIFICANT CHANGE UP (ref 3.5–5.3)
PROT SERPL-MCNC: 7.9 G/DL — SIGNIFICANT CHANGE UP (ref 6.6–8.7)
PROTHROM AB SERPL-ACNC: 12.5 SEC — SIGNIFICANT CHANGE UP (ref 10–12.9)
RBC # BLD: 5.64 M/UL — SIGNIFICANT CHANGE UP (ref 4.2–5.8)
RBC # FLD: 13.3 % — SIGNIFICANT CHANGE UP (ref 10.3–14.5)
SODIUM SERPL-SCNC: 136 MMOL/L — SIGNIFICANT CHANGE UP (ref 135–145)
WBC # BLD: 7.04 K/UL — SIGNIFICANT CHANGE UP (ref 3.8–10.5)
WBC # FLD AUTO: 7.04 K/UL — SIGNIFICANT CHANGE UP (ref 3.8–10.5)

## 2019-11-24 PROCEDURE — 36415 COLL VENOUS BLD VENIPUNCTURE: CPT

## 2019-11-24 PROCEDURE — 36430 TRANSFUSION BLD/BLD COMPNT: CPT

## 2019-11-24 PROCEDURE — 96375 TX/PRO/DX INJ NEW DRUG ADDON: CPT

## 2019-11-24 PROCEDURE — 86850 RBC ANTIBODY SCREEN: CPT

## 2019-11-24 PROCEDURE — 86900 BLOOD TYPING SEROLOGIC ABO: CPT

## 2019-11-24 PROCEDURE — 85610 PROTHROMBIN TIME: CPT

## 2019-11-24 PROCEDURE — P9059: CPT

## 2019-11-24 PROCEDURE — 85027 COMPLETE CBC AUTOMATED: CPT

## 2019-11-24 PROCEDURE — 86901 BLOOD TYPING SEROLOGIC RH(D): CPT

## 2019-11-24 PROCEDURE — 96374 THER/PROPH/DIAG INJ IV PUSH: CPT

## 2019-11-24 PROCEDURE — 80053 COMPREHEN METABOLIC PANEL: CPT

## 2019-11-24 PROCEDURE — 99291 CRITICAL CARE FIRST HOUR: CPT

## 2019-11-24 PROCEDURE — 85730 THROMBOPLASTIN TIME PARTIAL: CPT

## 2019-11-24 PROCEDURE — 99285 EMERGENCY DEPT VISIT HI MDM: CPT | Mod: 25

## 2019-11-24 RX ORDER — FAMOTIDINE 10 MG/ML
20 INJECTION INTRAVENOUS ONCE
Refills: 0 | Status: COMPLETED | OUTPATIENT
Start: 2019-11-24 | End: 2019-11-24

## 2019-11-24 RX ORDER — DIPHENHYDRAMINE HCL 50 MG
50 CAPSULE ORAL EVERY 6 HOURS
Refills: 0 | Status: DISCONTINUED | OUTPATIENT
Start: 2019-11-24 | End: 2019-11-30

## 2019-11-24 RX ORDER — DEXAMETHASONE 0.5 MG/5ML
10 ELIXIR ORAL ONCE
Refills: 0 | Status: COMPLETED | OUTPATIENT
Start: 2019-11-24 | End: 2019-11-24

## 2019-11-24 RX ADMIN — Medication 50 MILLIGRAM(S): at 10:19

## 2019-11-24 RX ADMIN — Medication 102 MILLIGRAM(S): at 10:19

## 2019-11-24 RX ADMIN — FAMOTIDINE 20 MILLIGRAM(S): 10 INJECTION INTRAVENOUS at 10:19

## 2019-11-24 NOTE — ED PROVIDER NOTE - CRITICAL CARE PROVIDED
interpretation of diagnostic studies/consult w/ pt's family directly relating to pts condition/additional history taking/direct patient care (not related to procedure)/consultation with other physicians

## 2019-11-24 NOTE — ED PROVIDER NOTE - OBJECTIVE STATEMENT
Pertinent PMH/PSH/FHx/SHx and Review of Systems contained within:  Patient presents to the ED for FB sensation in throat wiwht blood streaked mucuous that started this morning.  PMH of HTN, HLD, loop recorder, GERD.  Started lisinopril ~3 months ago.  baseline yestereday without new exposures.  does not feel sick.  "not a normal sore throat."  feels FB sensation with "tingling feeling in my lips and tongue." family bedside.  VSS but mild tachhy and HTN.  no trauma.  no Hx allergies.  Non toxic.  Well appearing. No aggravating or relieving factors. No other pertinent PMH.  No other pertinent PSH.  No other pertinent FHx.  Patient denies EtOH/tobacco/illicit substance use. No fever/chills, No photophobia/eye pain/changes in vision, No ear pain/dysphagia, No chest pain/palpitations, no SOB/cough/wheeze/stridor, No abdominal pain, No N/V/D, no dysuria/frequency/discharge, No neck/back pain, no rash, no changes in neurological status/function.

## 2019-11-24 NOTE — ED PROVIDER NOTE - PHYSICAL EXAMINATION
Gen: Alert, NAD  Head: NC, AT, PERRL, EOMI, normal lids/conjunctiva  ENT: normal hearing, patent oropharynx with mild erythema but no exudate, uvula midline but edematous  Neck: +supple, no tenderness/meningismus/JVD, +Trachea midline  Pulm: Bilateral BS, normal resp effort, no wheeze/stridor/retractions  CV: RRR, no M/R/G, +dist pulses  Abd: soft, NT/ND, +BS, no hepatosplenomegaly  Mskel: no edema/erythema/cyanosis  Skin: no rash  Neuro: AAOx3, no gross sensory/motor deficits, CN 2-12 intact

## 2019-11-24 NOTE — ED ADULT NURSE NOTE - OBJECTIVE STATEMENT
Pt reports waking up having a sore throat with what felt like phlegm was in and upon trying to clear throat "there was some blood in it", pt reports the feeling of something stuck in throat, denies recent illness, edema noted to uvula, pt reports being on lisinopril for past three months, no other facial swelling noted at this time, pt denies difficulty breathing/swallowing at this time, no drooling noted

## 2019-11-24 NOTE — ED PROVIDER NOTE - PROGRESS NOTE DETAILS
pt with  1 unit of ffp given ; pt with less swelling of uvula and decreased sensation in throat; pt to get  2 units and continued observation pt improved ; no further symptoms; uvula with minnila swelling; no stridor ; chest clear;  dc on pepcid , benadryl and prednisone;  increase verapmil er 120mg by mouth 2 times a day  Stop lisinopril;

## 2019-11-24 NOTE — ED PROVIDER NOTE - CLINICAL SUMMARY MEDICAL DECISION MAKING FREE TEXT BOX
Patient seen in brief intake.  S/Sx c/w early angioedmea.  will treat.  signed out to Dr. Amor in the Main ED.  Patient signed out to incoming physician.  All decisions regarding the progression of care will be made at their discretion.

## 2019-11-24 NOTE — ED PROVIDER NOTE - PATIENT PORTAL LINK FT
You can access the FollowMyHealth Patient Portal offered by [facility] by registering at the following website: [email or web address]. By joining [employer]’s FollowMyHealth portal, you will also be able to view your health information using other applications (apps) compatible with our system.

## 2019-11-24 NOTE — ED PROVIDER NOTE - CARE PLAN
Principal Discharge DX:	Angiotensin converting enzyme inhibitor-aggravated angioedema, initial encounter

## 2019-12-02 ENCOUNTER — APPOINTMENT (OUTPATIENT)
Dept: ELECTROPHYSIOLOGY | Facility: CLINIC | Age: 64
End: 2019-12-02
Payer: COMMERCIAL

## 2019-12-02 PROCEDURE — 93299: CPT

## 2019-12-02 PROCEDURE — 93298 REM INTERROG DEV EVAL SCRMS: CPT

## 2020-01-03 ENCOUNTER — APPOINTMENT (OUTPATIENT)
Dept: ELECTROPHYSIOLOGY | Facility: CLINIC | Age: 65
End: 2020-01-03
Payer: COMMERCIAL

## 2020-01-03 PROCEDURE — G2066: CPT

## 2020-01-03 PROCEDURE — 93298 REM INTERROG DEV EVAL SCRMS: CPT

## 2020-02-03 ENCOUNTER — APPOINTMENT (OUTPATIENT)
Dept: ELECTROPHYSIOLOGY | Facility: CLINIC | Age: 65
End: 2020-02-03
Payer: COMMERCIAL

## 2020-02-03 PROCEDURE — 93298 REM INTERROG DEV EVAL SCRMS: CPT

## 2020-02-03 PROCEDURE — G2066: CPT

## 2020-03-06 ENCOUNTER — APPOINTMENT (OUTPATIENT)
Dept: ELECTROPHYSIOLOGY | Facility: CLINIC | Age: 65
End: 2020-03-06
Payer: COMMERCIAL

## 2020-03-06 PROCEDURE — G2066: CPT

## 2020-03-06 PROCEDURE — 93298 REM INTERROG DEV EVAL SCRMS: CPT

## 2020-03-31 ENCOUNTER — APPOINTMENT (OUTPATIENT)
Dept: ELECTROPHYSIOLOGY | Facility: CLINIC | Age: 65
End: 2020-03-31
Payer: COMMERCIAL

## 2020-03-31 ENCOUNTER — NON-APPOINTMENT (OUTPATIENT)
Age: 65
End: 2020-03-31

## 2020-03-31 VITALS
SYSTOLIC BLOOD PRESSURE: 157 MMHG | OXYGEN SATURATION: 98 % | WEIGHT: 198 LBS | HEART RATE: 65 BPM | DIASTOLIC BLOOD PRESSURE: 97 MMHG | HEIGHT: 69 IN | BODY MASS INDEX: 29.33 KG/M2

## 2020-03-31 VITALS — SYSTOLIC BLOOD PRESSURE: 152 MMHG | DIASTOLIC BLOOD PRESSURE: 90 MMHG

## 2020-03-31 PROCEDURE — 93000 ELECTROCARDIOGRAM COMPLETE: CPT

## 2020-03-31 PROCEDURE — 99213 OFFICE O/P EST LOW 20 MIN: CPT | Mod: 25

## 2020-03-31 NOTE — PHYSICAL EXAM
[General Appearance - Well Developed] : well developed [Normal Appearance] : normal appearance [Well Groomed] : well groomed [General Appearance - Well Nourished] : well nourished [Normal Conjunctiva] : the conjunctiva exhibited no abnormalities [Eyelids - No Xanthelasma] : the eyelids demonstrated no xanthelasmas [] : no respiratory distress [Exaggerated Use Of Accessory Muscles For Inspiration] : no accessory muscle use [Nail Clubbing] : no clubbing of the fingernails [Cyanosis, Localized] : no localized cyanosis [Oriented To Time, Place, And Person] : oriented to person, place, and time [Impaired Insight] : insight and judgment were intact [Affect] : the affect was normal

## 2020-04-08 ENCOUNTER — APPOINTMENT (OUTPATIENT)
Dept: ELECTROPHYSIOLOGY | Facility: CLINIC | Age: 65
End: 2020-04-08
Payer: COMMERCIAL

## 2020-04-08 PROCEDURE — G2066: CPT

## 2020-04-08 PROCEDURE — 93298 REM INTERROG DEV EVAL SCRMS: CPT

## 2020-04-22 ENCOUNTER — EMERGENCY (EMERGENCY)
Facility: HOSPITAL | Age: 65
LOS: 1 days | Discharge: HOME | End: 2020-04-22
Attending: EMERGENCY MEDICINE
Payer: COMMERCIAL

## 2020-04-22 VITALS
WEIGHT: 179.9 LBS | TEMPERATURE: 99 F | SYSTOLIC BLOOD PRESSURE: 198 MMHG | HEIGHT: 69 IN | RESPIRATION RATE: 18 BRPM | HEART RATE: 115 BPM | DIASTOLIC BLOOD PRESSURE: 97 MMHG | OXYGEN SATURATION: 99 %

## 2020-04-22 DIAGNOSIS — Z98.890 OTHER SPECIFIED POSTPROCEDURAL STATES: Chronic | ICD-10-CM

## 2020-04-22 LAB
BASOPHILS # BLD AUTO: 0.03 K/UL — SIGNIFICANT CHANGE UP (ref 0–0.2)
BASOPHILS NFR BLD AUTO: 0.4 % — SIGNIFICANT CHANGE UP (ref 0–2)
EOSINOPHIL # BLD AUTO: 0.01 K/UL — SIGNIFICANT CHANGE UP (ref 0–0.5)
EOSINOPHIL NFR BLD AUTO: 0.1 % — SIGNIFICANT CHANGE UP (ref 0–6)
HCT VFR BLD CALC: 48.2 % — SIGNIFICANT CHANGE UP (ref 39–50)
HGB BLD-MCNC: 15.8 G/DL — SIGNIFICANT CHANGE UP (ref 13–17)
IMM GRANULOCYTES NFR BLD AUTO: 0.1 % — SIGNIFICANT CHANGE UP (ref 0–1.5)
LYMPHOCYTES # BLD AUTO: 1.76 K/UL — SIGNIFICANT CHANGE UP (ref 1–3.3)
LYMPHOCYTES # BLD AUTO: 21.1 % — SIGNIFICANT CHANGE UP (ref 13–44)
MCHC RBC-ENTMCNC: 28.5 PG — SIGNIFICANT CHANGE UP (ref 27–34)
MCHC RBC-ENTMCNC: 32.8 GM/DL — SIGNIFICANT CHANGE UP (ref 32–36)
MCV RBC AUTO: 87 FL — SIGNIFICANT CHANGE UP (ref 80–100)
MONOCYTES # BLD AUTO: 0.72 K/UL — SIGNIFICANT CHANGE UP (ref 0–0.9)
MONOCYTES NFR BLD AUTO: 8.6 % — SIGNIFICANT CHANGE UP (ref 2–14)
NEUTROPHILS # BLD AUTO: 5.82 K/UL — SIGNIFICANT CHANGE UP (ref 1.8–7.4)
NEUTROPHILS NFR BLD AUTO: 69.7 % — SIGNIFICANT CHANGE UP (ref 43–77)
PLATELET # BLD AUTO: 219 K/UL — SIGNIFICANT CHANGE UP (ref 150–400)
RBC # BLD: 5.54 M/UL — SIGNIFICANT CHANGE UP (ref 4.2–5.8)
RBC # FLD: 12.4 % — SIGNIFICANT CHANGE UP (ref 10.3–14.5)
WBC # BLD: 8.35 K/UL — SIGNIFICANT CHANGE UP (ref 3.8–10.5)
WBC # FLD AUTO: 8.35 K/UL — SIGNIFICANT CHANGE UP (ref 3.8–10.5)

## 2020-04-22 PROCEDURE — 93010 ELECTROCARDIOGRAM REPORT: CPT

## 2020-04-22 PROCEDURE — 99285 EMERGENCY DEPT VISIT HI MDM: CPT

## 2020-04-22 RX ORDER — ALBUTEROL 90 UG/1
1 AEROSOL, METERED ORAL ONCE
Refills: 0 | Status: COMPLETED | OUTPATIENT
Start: 2020-04-22 | End: 2020-04-22

## 2020-04-22 RX ORDER — MAGNESIUM SULFATE 500 MG/ML
2 VIAL (ML) INJECTION ONCE
Refills: 0 | Status: COMPLETED | OUTPATIENT
Start: 2020-04-22 | End: 2020-04-22

## 2020-04-22 NOTE — ED PROVIDER NOTE - PROGRESS NOTE DETAILS
reviewed lab work, ekg, chest xray, pt to follow up with pmd, does not wish to stay in the  er any longer

## 2020-04-22 NOTE — ED PROVIDER NOTE - PATIENT PORTAL LINK FT
You can access the FollowMyHealth Patient Portal offered by [facility] by registering at the following website: [email or web address]. By joining [employer]’s FollowMyHealth portal, you will also be able to view your health information using other applications (apps) compatible with our system.

## 2020-04-22 NOTE — ED PROVIDER NOTE - ATTENDING CONTRIBUTION TO CARE
Min BENSON- 64 Y/O M with h/o htn, hld, palpitations - has loop recorder p/w sob for 3 hrs today sudden onset and feels like its difficult to catch breathe, no fever, chills, cough, nausea, vomiting. Pt has definite covid contacts at work, works as     pt is alert, well appearing male, s1s2 normal reg, b/l clear breath sounds, abd soft, nt, nd, normal bowel sounds, no cvat b/l, neuro exam aox3, cn 2-12 intact, no focal deficits, skin warm, dry, good turgor    plan to check labs, cxr, covid test, ekg, will likley discharge with pulse ox if covid+

## 2020-04-22 NOTE — ED PROVIDER NOTE - OBJECTIVE STATEMENT
pt is a 66 y/o male with a pmhx of HTN, HLD, loop recorder for heart palpitations presenting to the ed with sob that started this afternoon. pt states he feels he can't take a deep breath. pt with associated chest tightness. pt with positive covid contacts [place]. pt denies past hx of blood clots, calf pain or leg swelling. pt denies fevers, cough, neck pain, photophobia, abd pain, nausea, vomiting, dysuria, back pain

## 2020-04-23 ENCOUNTER — EMERGENCY (EMERGENCY)
Facility: HOSPITAL | Age: 65
LOS: 1 days | Discharge: HOME | End: 2020-04-23
Attending: EMERGENCY MEDICINE
Payer: COMMERCIAL

## 2020-04-23 VITALS
DIASTOLIC BLOOD PRESSURE: 84 MMHG | OXYGEN SATURATION: 99 % | SYSTOLIC BLOOD PRESSURE: 152 MMHG | HEIGHT: 69 IN | WEIGHT: 181 LBS | HEART RATE: 98 BPM | RESPIRATION RATE: 20 BRPM | TEMPERATURE: 99 F

## 2020-04-23 VITALS
TEMPERATURE: 99 F | OXYGEN SATURATION: 100 % | RESPIRATION RATE: 18 BRPM | DIASTOLIC BLOOD PRESSURE: 104 MMHG | HEART RATE: 106 BPM | SYSTOLIC BLOOD PRESSURE: 152 MMHG

## 2020-04-23 VITALS — HEART RATE: 109 BPM | OXYGEN SATURATION: 95 % | RESPIRATION RATE: 20 BRPM

## 2020-04-23 DIAGNOSIS — Z98.890 OTHER SPECIFIED POSTPROCEDURAL STATES: Chronic | ICD-10-CM

## 2020-04-23 LAB
ALBUMIN SERPL ELPH-MCNC: 4.2 G/DL — SIGNIFICANT CHANGE UP (ref 3.3–5.2)
ALP SERPL-CCNC: 102 U/L — SIGNIFICANT CHANGE UP (ref 40–120)
ALT FLD-CCNC: 34 U/L — SIGNIFICANT CHANGE UP
ANION GAP SERPL CALC-SCNC: 15 MMOL/L — SIGNIFICANT CHANGE UP (ref 5–17)
AST SERPL-CCNC: 30 U/L — SIGNIFICANT CHANGE UP
BILIRUB SERPL-MCNC: 0.3 MG/DL — LOW (ref 0.4–2)
BUN SERPL-MCNC: 7 MG/DL — LOW (ref 8–20)
CALCIUM SERPL-MCNC: 9.5 MG/DL — SIGNIFICANT CHANGE UP (ref 8.6–10.2)
CHLORIDE SERPL-SCNC: 94 MMOL/L — LOW (ref 98–107)
CO2 SERPL-SCNC: 28 MMOL/L — SIGNIFICANT CHANGE UP (ref 22–29)
CREAT SERPL-MCNC: 0.97 MG/DL — SIGNIFICANT CHANGE UP (ref 0.5–1.3)
D DIMER BLD IA.RAPID-MCNC: 311 NG/ML DDU — HIGH
GLUCOSE SERPL-MCNC: 117 MG/DL — HIGH (ref 70–99)
INR BLD: 1.09 RATIO — SIGNIFICANT CHANGE UP (ref 0.88–1.16)
NT-PROBNP SERPL-SCNC: 31 PG/ML — SIGNIFICANT CHANGE UP (ref 0–300)
POTASSIUM SERPL-MCNC: 4.4 MMOL/L — SIGNIFICANT CHANGE UP (ref 3.5–5.3)
POTASSIUM SERPL-SCNC: 4.4 MMOL/L — SIGNIFICANT CHANGE UP (ref 3.5–5.3)
PROT SERPL-MCNC: 7.3 G/DL — SIGNIFICANT CHANGE UP (ref 6.6–8.7)
PROTHROM AB SERPL-ACNC: 12.4 SEC — SIGNIFICANT CHANGE UP (ref 10–12.9)
SARS-COV-2 RNA SPEC QL NAA+PROBE: DETECTED
SODIUM SERPL-SCNC: 136 MMOL/L — SIGNIFICANT CHANGE UP (ref 135–145)
TROPONIN T SERPL-MCNC: <0.01 NG/ML — SIGNIFICANT CHANGE UP (ref 0–0.06)

## 2020-04-23 PROCEDURE — 71045 X-RAY EXAM CHEST 1 VIEW: CPT | Mod: 26

## 2020-04-23 PROCEDURE — 36415 COLL VENOUS BLD VENIPUNCTURE: CPT

## 2020-04-23 PROCEDURE — 96365 THER/PROPH/DIAG IV INF INIT: CPT

## 2020-04-23 PROCEDURE — 80053 COMPREHEN METABOLIC PANEL: CPT

## 2020-04-23 PROCEDURE — 99285 EMERGENCY DEPT VISIT HI MDM: CPT | Mod: 25

## 2020-04-23 PROCEDURE — 85027 COMPLETE CBC AUTOMATED: CPT

## 2020-04-23 PROCEDURE — 94640 AIRWAY INHALATION TREATMENT: CPT

## 2020-04-23 PROCEDURE — 85730 THROMBOPLASTIN TIME PARTIAL: CPT

## 2020-04-23 PROCEDURE — 93005 ELECTROCARDIOGRAM TRACING: CPT

## 2020-04-23 PROCEDURE — 84484 ASSAY OF TROPONIN QUANT: CPT

## 2020-04-23 PROCEDURE — 99284 EMERGENCY DEPT VISIT MOD MDM: CPT

## 2020-04-23 PROCEDURE — 85610 PROTHROMBIN TIME: CPT

## 2020-04-23 PROCEDURE — 83880 ASSAY OF NATRIURETIC PEPTIDE: CPT

## 2020-04-23 PROCEDURE — 99283 EMERGENCY DEPT VISIT LOW MDM: CPT

## 2020-04-23 PROCEDURE — 87635 SARS-COV-2 COVID-19 AMP PRB: CPT

## 2020-04-23 PROCEDURE — 85379 FIBRIN DEGRADATION QUANT: CPT

## 2020-04-23 PROCEDURE — 71045 X-RAY EXAM CHEST 1 VIEW: CPT

## 2020-04-23 RX ORDER — FLUTICASONE PROPIONATE 50 MCG
1 SPRAY, SUSPENSION NASAL
Qty: 1 | Refills: 0
Start: 2020-04-23 | End: 2020-05-22

## 2020-04-23 RX ADMIN — ALBUTEROL 1 PUFF(S): 90 AEROSOL, METERED ORAL at 00:16

## 2020-04-23 RX ADMIN — Medication 50 GRAM(S): at 00:16

## 2020-04-23 RX ADMIN — Medication 2 GRAM(S): at 01:12

## 2020-04-23 NOTE — ED STATDOCS - OBJECTIVE STATEMENT
64 y/o M pt with significant PMHx of GERD, HTN, HLD, and palpitations presents to the ED c/o SOB and nasal congestion. Pt tested positive for COVID yesterday. Congestion has worsened since yesterday, making it harder to breath through the nose. Pt reports having no difficulty breathing through the mouth. Pt further denies CP, weakness, n/v/d. Pt has not taken any meds for Sx.   No further complaints at this time.

## 2020-04-23 NOTE — ED STATDOCS - CLINICAL SUMMARY MEDICAL DECISION MAKING FREE TEXT BOX
Pt with COVID and nasal congestion. No hypoxia, no respiratory distress. Will treat with Flonase and discharge with pulse ox monitor.

## 2020-04-23 NOTE — ED STATDOCS - NSFOLLOWUPINSTRUCTIONS_ED_ALL_ED_FT
COVID-19 (Coronavirus Disease 2019)    WHAT YOU NEED TO KNOW:    COVID-19 is the disease caused by the 2019 novel (new) coronavirus. It was first found in individuals in a part of [place] in late 2019. The virus is spreading to other countries as infected persons travel. Coronaviruses generally cause respiratory (nose, throat, and lung) infections, such as a cold. They can also cause serious infections such as Middle East respiratory syndrome (MERS) and severe acute respiratory syndrome (SARS). The new virus is related to the SARS coronavirus, so it is officially named SARS coronavirus 2 (SARS-CoV-2).    DISCHARGE INSTRUCTIONS:    If you think you or someone you know may be infected: It is important for anyone who may be infected to get tested right away. Do the following to protect others:     If emergency care is needed, tell the  about the possible infection, or call ahead and tell the emergency department.      Call a healthcare provider to be seen in the office. Anyone who may be infected should not arrive without calling first. The provider will need to protect staff members and other patients.      The person who may be infected needs to wear a medical mask before getting medical care. The mask needs to stay on until the provider says to remove it.    Call your local emergency number (911 in the [place]) or go to the emergency department if: You have signs or symptoms of COVID-19, and any of the following is true:     You traveled within the last 14 days to an area where the virus is active or had close contact with someone who did.      You had close contact within the last 14 days with someone who has a confirmed infection.    Call your doctor if: You do not have signs or symptoms of COVID-19, but any of the following is true:     You traveled within the last 14 days to an area where the virus is active or had close contact with someone who did.      You had close contact within the last 14 days with someone who has a confirmed infection.      You have questions or concerns about your condition or care.    Medicines: You may need any of the following for mild symptoms:     Decongestants help reduce nasal congestion and help you breathe more easily. If you take decongestant pills, they may make you feel restless or cause problems with your sleep. Do not use decongestant sprays for more than a few days.      Cough suppressants help reduce coughing. Ask your healthcare provider which type of cough medicine is best for you.      Acetaminophen decreases pain and fever. It is available without a doctor's order. Ask how much to take and how often to take it. Follow directions. Read the labels of all other medicines you are using to see if they also contain acetaminophen, or ask your doctor or pharmacist. Acetaminophen can cause liver damage if not taken correctly. Do not use more than 4 grams (4,000 milligrams) total of acetaminophen in one day.       NSAIDs, such as ibuprofen, help decrease swelling, pain, and fever. NSAIDs can cause stomach bleeding or kidney problems in certain people. If you take blood thinner medicine, always ask your healthcare provider if NSAIDs are safe for you. Always read the medicine label and follow directions.      Take your medicine as directed. Contact your healthcare provider if you think your medicine is not helping or if you have side effects. Tell him or her if you are allergic to any medicine. Keep a list of the medicines, vitamins, and herbs you take. Include the amounts, and when and why you take them. Bring the list or the pill bottles to follow-up visits. Carry your medicine list with you in case of an emergency.    How the 2019 coronavirus spreads: The virus appears to spread quickly and easily. The following are ways the virus is thought to spread, but more information may be coming:     Droplets are the most common way all coronaviruses spread. The virus can travel in droplets that form when a person talks, coughs, or sneezes. Anyone who breathes in the virus or gets it in his or her eyes can become infected.      An infected person may be able to leave the virus on objects and surfaces. Another person can get the virus on his or her hands by touching the object or surface. Infection happens if the person then touches his or her eyes or mouth with unwashed hands. It is not yet known how long the virus can stay on an object or surface. Evidence shows it may be as long as 9 days at room temperature.      Person-to-person contact may spread the virus. For example, an infected person can spread the virus by shaking hands with someone. At this time, it does not appear that the virus can be passed to a baby during pregnancy or delivery. The virus also does not appear to spread during breastfeeding. If you are pregnant or breastfeeding, talk to your healthcare provider or obstetrician about any concerns you have.      An infected animal may be able to infect a person who touches it. This may happen at live markets or on a farm.    Prevent a 2019 coronavirus infection: Everyone should do the following to prevent getting or spreading the virus:     Wash your hands often. Use soap and water every time you wash your hands. Rub your soapy hands together, lacing your fingers. Use the fingers of one hand to scrub under the nails of the other hand. Wash for at least 20 seconds. Rinse with warm, running water for several seconds. Then dry your hands. Use germ-killing gel if soap and water are not available. Do not touch your eyes or mouth without washing your hands first. Handwashing           Cover a sneeze or cough. Use a tissue that covers your mouth and nose. Throw the tissue away in a trash can right away. Use the bend of your arm if a tissue is not available. Wash your hands well with soap and water or use a hand . Do not stand close to anyone who is sneezing or coughing.      Be careful around others. The best way to prevent infection is to avoid anyone who is infected, but this may be difficult. An infected person may be able to spread the virus before signs or symptoms begin. Until more is known, you may not want to shake hands with others. If you do shake hands, wash your hands or use hand  as soon as possible. You do not need to wear a medical mask if you are well and not caring for an infected person.      Ask about vaccines you may need. No vaccine is available for the new coronavirus. But any infection can affect your immune system. A weakened immune system makes you more vulnerable to the new coronavirus. Until a vaccine against the new virus is developed, do the following:   Get a flu vaccine as soon as recommended each year. The flu vaccine is available starting in September or October. Flu viruses change, so it is important to get a flu vaccine every year.      Talk to your healthcare provider about your vaccine history. Tell him or her if you did not get certain vaccines as a child, or you did not get all recommended doses. Tell him or her if you do not know your vaccine history. He or she will tell you which vaccines you need, and when to get them.           If you have COVID-19: Healthcare providers will give you specific instructions to follow. The following are general guidelines to remind you of how to keep others safe until you are well:     Limit close contact with others. Your healthcare provider will tell you when it is okay to be around others. This may be when you do not have a fever, do not take fever medicine, and have no symptoms. Fluid from your respiratory tract will need to test negative for the virus 2 times at least 24 hours apart. Until then, do the following along with any instructions from your provider:   Only go out of the house for medical appointments. Always call the provider’s office first so he or she can prepare to keep others safe.      Stay at least 6 feet (2 meters) away from others.      Sleep in a different room from others in the house.      Do not shake hands with other people.      Wear a medical mask when others are near you. This can help prevent droplets from spreading the virus when you talk, sneeze, or cough.      Do not share items. Do not share dishes, towels, or other items with anyone. Items need to be washed after you use them.      Do not handle live animals. The virus may be spread to animals, including pets. Until more is known, it is best not to touch, play with, or handle live animals.    Self-care:     Drink more liquids as directed. Liquids will help thin and loosen mucus so you can cough it up. Liquids will also help prevent dehydration. Liquids that help prevent dehydration include water, fruit juice, and broth. Do not drink liquids that contain caffeine. Caffeine can increase your risk for dehydration. Ask your healthcare provider how much liquid to drink each day.      Soothe a sore throat. Gargle with warm salt water. This may help your sore throat feel better. Make salt water by dissolving ¼ teaspoon salt in 1 cup warm water. You may also suck on hard candy or throat lozenges. You may use a sore throat spray.      Use a humidifier or vaporizer. Use a cool mist humidifier or a vaporizer to increase air moisture in your home. This may make it easier for you to breathe and help decrease your cough.      Use saline nasal drops as directed. These help relieve congestion.      Apply petroleum-based jelly around the outside of your nostrils. This can decrease irritation from blowing your nose.      Do not smoke. Nicotine and other chemicals in cigarettes and cigars can make your symptoms worse. They can also cause infections such as bronchitis or pneumonia. Ask your healthcare provider for information if you currently smoke and need help to quit. E-cigarettes or smokeless tobacco still contain nicotine. Talk to your healthcare provider before you use these products.    If you take care of someone who has COVID-19:     Wear a medical mask when you are near the person. This can help protect you from droplets that carry the virus when the person talks, sneezes, or coughs.      Do not allow others to go near the person. No one should come to the person's home unless it is necessary. Keep the room's door shut unless you need to go in or out.      Make sure the person's room has good air flow. You may be able to open the window if the weather allows. An air conditioner can also be turned on to help air move.      Clean items the person uses or touches. Wear disposable gloves to handle the person's laundry. Place the laundry in a plastic bag. Use hot water and soap to wash the person's laundry. Wash eating utensils and other items after the person uses them. Throw your gloves away after you use them.      Clean surfaces often. Use bleach diluted with water or disinfecting wipes. Clean counters, doorknobs, toilet seats, and other surfaces.    Follow up with your doctor as directed: Write down your questions so you remember to ask them during your visits.    For more information:     Centers for Disease Control and Prevention  [address], [place]  Phone: 5-023-9201428  Phone: [phone]  Web Address: http://www.cdc.gov

## 2020-04-23 NOTE — ED ADULT TRIAGE NOTE - CHIEF COMPLAINT QUOTE
Patient arrived to ED today with c/o SOB since last night.  Patient tested positive for COVID last night.

## 2020-04-23 NOTE — ED STATDOCS - PATIENT PORTAL LINK FT
You can access the FollowMyHealth Patient Portal offered by [facility] by registering at the following website: [email or web address]. By joining [facility]s FollowMyHealth portal, you will also be able to view your health information using other applications (apps) compatible with our system.

## 2020-04-24 ENCOUNTER — EMERGENCY (EMERGENCY)
Facility: HOSPITAL | Age: 65
LOS: 1 days | End: 2020-04-24
Payer: COMMERCIAL

## 2020-04-24 VITALS
RESPIRATION RATE: 18 BRPM | WEIGHT: 177.03 LBS | HEART RATE: 100 BPM | DIASTOLIC BLOOD PRESSURE: 105 MMHG | HEIGHT: 69 IN | OXYGEN SATURATION: 100 % | TEMPERATURE: 98 F | SYSTOLIC BLOOD PRESSURE: 177 MMHG

## 2020-04-24 DIAGNOSIS — Z98.890 OTHER SPECIFIED POSTPROCEDURAL STATES: Chronic | ICD-10-CM

## 2020-04-24 PROCEDURE — L9991: CPT

## 2020-04-24 NOTE — ED CLERICAL - NS ED CLERK NOTE PRE-ARRIVAL INFORMATION; ADDITIONAL PRE-ARRIVAL INFORMATION
This patient is enrolled in the readmission program and has active care navigation. This patient can be followed up by the care navigation team within 24 hours. To arrange close follow-up or to obtain additional clinical information about this patient, please call the contact number above. COVID19

## 2020-04-27 ENCOUNTER — EMERGENCY (EMERGENCY)
Facility: HOSPITAL | Age: 65
LOS: 1 days | Discharge: HOME | End: 2020-04-27
Attending: STUDENT IN AN ORGANIZED HEALTH CARE EDUCATION/TRAINING PROGRAM
Payer: COMMERCIAL

## 2020-04-27 VITALS
SYSTOLIC BLOOD PRESSURE: 170 MMHG | TEMPERATURE: 98 F | HEART RATE: 92 BPM | HEIGHT: 69 IN | OXYGEN SATURATION: 99 % | RESPIRATION RATE: 18 BRPM | DIASTOLIC BLOOD PRESSURE: 90 MMHG

## 2020-04-27 VITALS — DIASTOLIC BLOOD PRESSURE: 92 MMHG | SYSTOLIC BLOOD PRESSURE: 184 MMHG | HEART RATE: 90 BPM | OXYGEN SATURATION: 100 %

## 2020-04-27 DIAGNOSIS — Z98.890 OTHER SPECIFIED POSTPROCEDURAL STATES: Chronic | ICD-10-CM

## 2020-04-27 LAB
ANION GAP SERPL CALC-SCNC: 13 MMOL/L — SIGNIFICANT CHANGE UP (ref 5–17)
APTT BLD: 33.8 SEC — SIGNIFICANT CHANGE UP (ref 27.5–36.3)
BASOPHILS # BLD AUTO: 0.02 K/UL — SIGNIFICANT CHANGE UP (ref 0–0.2)
BASOPHILS NFR BLD AUTO: 0.3 % — SIGNIFICANT CHANGE UP (ref 0–2)
BUN SERPL-MCNC: 8 MG/DL — SIGNIFICANT CHANGE UP (ref 8–20)
CALCIUM SERPL-MCNC: 9.1 MG/DL — SIGNIFICANT CHANGE UP (ref 8.6–10.2)
CHLORIDE SERPL-SCNC: 97 MMOL/L — LOW (ref 98–107)
CO2 SERPL-SCNC: 28 MMOL/L — SIGNIFICANT CHANGE UP (ref 22–29)
CREAT SERPL-MCNC: 0.86 MG/DL — SIGNIFICANT CHANGE UP (ref 0.5–1.3)
EOSINOPHIL # BLD AUTO: 0.02 K/UL — SIGNIFICANT CHANGE UP (ref 0–0.5)
EOSINOPHIL NFR BLD AUTO: 0.3 % — SIGNIFICANT CHANGE UP (ref 0–6)
GLUCOSE SERPL-MCNC: 123 MG/DL — HIGH (ref 70–99)
HCT VFR BLD CALC: 47.5 % — SIGNIFICANT CHANGE UP (ref 39–50)
HGB BLD-MCNC: 15.4 G/DL — SIGNIFICANT CHANGE UP (ref 13–17)
IMM GRANULOCYTES NFR BLD AUTO: 0.4 % — SIGNIFICANT CHANGE UP (ref 0–1.5)
INR BLD: 1.13 RATIO — SIGNIFICANT CHANGE UP (ref 0.88–1.16)
LYMPHOCYTES # BLD AUTO: 1.89 K/UL — SIGNIFICANT CHANGE UP (ref 1–3.3)
LYMPHOCYTES # BLD AUTO: 24.2 % — SIGNIFICANT CHANGE UP (ref 13–44)
MCHC RBC-ENTMCNC: 28.2 PG — SIGNIFICANT CHANGE UP (ref 27–34)
MCHC RBC-ENTMCNC: 32.4 GM/DL — SIGNIFICANT CHANGE UP (ref 32–36)
MCV RBC AUTO: 87 FL — SIGNIFICANT CHANGE UP (ref 80–100)
MONOCYTES # BLD AUTO: 0.64 K/UL — SIGNIFICANT CHANGE UP (ref 0–0.9)
MONOCYTES NFR BLD AUTO: 8.2 % — SIGNIFICANT CHANGE UP (ref 2–14)
NEUTROPHILS # BLD AUTO: 5.22 K/UL — SIGNIFICANT CHANGE UP (ref 1.8–7.4)
NEUTROPHILS NFR BLD AUTO: 66.6 % — SIGNIFICANT CHANGE UP (ref 43–77)
NT-PROBNP SERPL-SCNC: 17 PG/ML — SIGNIFICANT CHANGE UP (ref 0–300)
PLATELET # BLD AUTO: 244 K/UL — SIGNIFICANT CHANGE UP (ref 150–400)
POTASSIUM SERPL-MCNC: 3.9 MMOL/L — SIGNIFICANT CHANGE UP (ref 3.5–5.3)
POTASSIUM SERPL-SCNC: 3.9 MMOL/L — SIGNIFICANT CHANGE UP (ref 3.5–5.3)
PROTHROM AB SERPL-ACNC: 12.8 SEC — SIGNIFICANT CHANGE UP (ref 10–12.9)
RBC # BLD: 5.46 M/UL — SIGNIFICANT CHANGE UP (ref 4.2–5.8)
RBC # FLD: 12.9 % — SIGNIFICANT CHANGE UP (ref 10.3–14.5)
SODIUM SERPL-SCNC: 138 MMOL/L — SIGNIFICANT CHANGE UP (ref 135–145)
TROPONIN T SERPL-MCNC: <0.01 NG/ML — SIGNIFICANT CHANGE UP (ref 0–0.06)
WBC # BLD: 7.82 K/UL — SIGNIFICANT CHANGE UP (ref 3.8–10.5)
WBC # FLD AUTO: 7.82 K/UL — SIGNIFICANT CHANGE UP (ref 3.8–10.5)

## 2020-04-27 PROCEDURE — 85730 THROMBOPLASTIN TIME PARTIAL: CPT

## 2020-04-27 PROCEDURE — 85027 COMPLETE CBC AUTOMATED: CPT

## 2020-04-27 PROCEDURE — 71275 CT ANGIOGRAPHY CHEST: CPT

## 2020-04-27 PROCEDURE — 99285 EMERGENCY DEPT VISIT HI MDM: CPT

## 2020-04-27 PROCEDURE — 71275 CT ANGIOGRAPHY CHEST: CPT | Mod: 26

## 2020-04-27 PROCEDURE — 80048 BASIC METABOLIC PNL TOTAL CA: CPT

## 2020-04-27 PROCEDURE — 83880 ASSAY OF NATRIURETIC PEPTIDE: CPT

## 2020-04-27 PROCEDURE — 36415 COLL VENOUS BLD VENIPUNCTURE: CPT

## 2020-04-27 PROCEDURE — 93010 ELECTROCARDIOGRAM REPORT: CPT

## 2020-04-27 PROCEDURE — 84484 ASSAY OF TROPONIN QUANT: CPT

## 2020-04-27 PROCEDURE — 93005 ELECTROCARDIOGRAM TRACING: CPT

## 2020-04-27 PROCEDURE — 85610 PROTHROMBIN TIME: CPT

## 2020-04-27 PROCEDURE — 99284 EMERGENCY DEPT VISIT MOD MDM: CPT | Mod: 25

## 2020-04-27 NOTE — ED PROVIDER NOTE - CLINICAL SUMMARY MEDICAL DECISION MAKING FREE TEXT BOX
66 yo male PMHx HTN (compliant with medication), HLD, palpitations (loop recorder in place) +COVID-19 presents to ED BIBA c/o difficulty breathing approx. 2 hours ago. In ED, patient % on RA, in NAD. Patient evaluated by [facility] 4 times in last week. On 4/22 had unremarkable CXR, EKG, and lab work. 66 yo male PMHx HTN (compliant with medication), HLD, palpitations (loop recorder in place) +COVID-19 presents to ED BIBA c/o difficulty breathing approx. 2 hours ago. In ED, patient % on RA, in NAD. Patient evaluated by [facility] 4 times in last week. On 4/22 had unremarkable CXR and EKG. Lab work unremarkable aside from mildly elevated d-dimer.  Plan:  - EKG  - Labs  - CTA r/o PE

## 2020-04-27 NOTE — ED CLERICAL - NS ED CLERK NOTE PRE-ARRIVAL INFORMATION; ADDITIONAL PRE-ARRIVAL INFORMATION
This patient is enrolled in the readmission program (COVID-19) and has active care navigation. This patient can be followed up by the care navigation team within 24 hours. To arrange close follow-up or to obtain additional clinical information about this patient, please call the contact number above.

## 2020-04-27 NOTE — ED PROVIDER NOTE - ATTENDING CONTRIBUTION TO CARE
66 yo male with likely acute covid infection returning for evaluation of recurrent dyspnea, usually at night. Patient with normal examination and vitals at this time. Previous labs reviewed. I personally saw the patient with the PA, and completed the key components of the history and physical exam. I then discussed the management plan with the PA.

## 2020-04-27 NOTE — HISTORY OF PRESENT ILLNESS
[FreeTextEntry1] : PCP: Dr. 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 He has a history of hypothyroidims treated with methimazole which has since improved. He underwent ILR implantation 3/20/19 to monitor for arrhythmias.\par \par Today he feels well but is concerned about his blood pressure. He has tried on multiple medications and notes that lisinopril resulted in angioedema.\par \par He denies any chest pain, dyspnea on exertion, orthopnea, paroxysmal nocturnal dyspnea, peripheral edema, lightheadedness, dizziness, syncope, nausea, vomiting, diaphoresis, melena, hematochezia, or hematemesis.

## 2020-04-27 NOTE — ED PROVIDER NOTE - PATIENT PORTAL LINK FT
You can access the FollowMyHealth Patient Portal offered by [facility] by registering at the following website: [email or web address]. By joining [facility]’s FollowMyHealth portal, you will also be able to view your health information using other applications (apps) compatible with our system.

## 2020-04-27 NOTE — ED PROVIDER NOTE - NSFOLLOWUPINSTRUCTIONS_ED_ALL_ED_FT
- COVID19 symptoms likely to persist another 2-3 weeks.  - Use inhaler as prescribed, as needed.   - Please call to schedule follow up appointment with your primary care physician within 24-48 hours.  - Please seek immediate medical attention for any new/worsening, signs/symptoms, or concerns.

## 2020-04-27 NOTE — ED ADULT NURSE NOTE - NSIMPLEMENTINTERV_GEN_ALL_ED
Implemented All Universal Safety Interventions:  [place] to call system. Call bell, personal items and telephone within reach. Instruct patient to call for assistance. Room bathroom lighting operational. Non-slip footwear when patient is off stretcher. Physically safe environment: no spills, clutter or unnecessary equipment. Stretcher in lowest position, wheels locked, appropriate side rails in place.

## 2020-04-27 NOTE — REASON FOR VISIT
[Follow-Up - Clinic] : a clinic follow-up of [Supraventricular Tachycardia] : supraventricular tachycardia [FreeTextEntry1] : Referring: Former patient of Dr. Nick

## 2020-04-27 NOTE — DISCUSSION/SUMMARY
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 Patient doing well overall without any significant symptoms and only low burden of arrhythmia. He is tolerating Verapamil well.  Will plan for follow up in 6 months.\par \par Recommendations:\par - Continue ILR monitoring\par - Continue Verapamil\par - Follow up in 6 months\par \par Erik Thakkar MD\par Clinical Cardiac Electrophysiology

## 2020-04-27 NOTE — ED PROVIDER NOTE - OBJECTIVE STATEMENT
66 yo male PMHx HTN (compliant with medication), HLD, palpitations (loop recorder in place) +COVID-19 presents to ED BIBA c/o difficulty breathing approx. 2 hours ago. States was SOB x15-20 minutes, home pulse ox of 79%. Additionally /100 en route. Last self medicated with acetaminophen >12 hours. Patient has inhaler at home. Of note, patient evaluated by [facility] now 4 times in the last week for symptoms of SOB and nasal congestion.   Denies fevers, chest pain, pleuritic chest pain, cough, abdominal pain, back pain. 66 yo male PMHx HTN (compliant with medication), HLD, palpitations (loop recorder in place) +COVID-19 presents to ED BIBA c/o difficulty breathing approx. 2 hours ago. States was SOB x15-20 minutes, home pulse ox of 79%. Additionally /100 en route. Last self medicated with acetaminophen >12 hours. Patient has inhaler at home. Of note, patient evaluated by [facility] now 4 times in the last week for symptoms of SOB and nasal congestion. On 4/22 patient had EKG, CXR (clear), and unremarkable lab work including d-dimer. No further complaints at this time.   Denies fevers, chest pain, pleuritic chest pain, cough, abdominal pain, back pain. 64 yo male PMHx HTN (compliant with medication), HLD, palpitations (loop recorder in place) +COVID-19 presents to ED BIBA c/o difficulty breathing approx. 2 hours ago. States was SOB x15-20 minutes, home pulse ox of 79%. Additionally /100 en route. Last self medicated with acetaminophen >12 hours. Patient has inhaler at home. Of note, patient evaluated by [facility] now 4 times in the last week for symptoms of SOB and nasal congestion. On 4/22 patient had EKG, CXR (clear), and unremarkable lab work aside from mildly elevated d-dimer. No further complaints at this time.   Denies fevers, chest pain, pleuritic chest pain, cough, abdominal pain, back pain.

## 2020-04-27 NOTE — ED PROVIDER NOTE - PHYSICAL EXAMINATION
General: In NAD, non-toxic appearing; well nourished/developed.  Skin: No rashes or lesions. Warm, dry, color normal for race. No cyanosis  Cardio: No lifts, heaves, visible pulsations. No thrills. Rate and rhythm regular, S1 & S2 clear. No audible murmur, gallop, or rub.   PV: No edema of feet/ankles. No calf tenderness. Pulses: b/l 2+ radial, DP, PT.  Resp: Speaking in full sentences. No visible nasal flaring, retractions, or deformity. No stridor. Normal AP to lateral diameter, symmetrical excursion b/l. Breath sounds vesicular, symmetrical and without rales, rhonchi or wheezing b/l.  Abd: Non-distended. Soft, non-tender, no masses palpated. No rebound, guarding.   Neuro: A&Ox3. CN II-XII grossly intact.

## 2020-04-27 NOTE — ED ADULT TRIAGE NOTE - CHIEF COMPLAINT QUOTE
pt was BIBA from home for hypertension, reports taking his BP at home tonight reading 200/100 despite taking his BP medications.  Denies headache, dizziness, changes in vision.   Reports + covid last Wednesday  VSS on arrival

## 2020-05-13 ENCOUNTER — APPOINTMENT (OUTPATIENT)
Dept: ELECTROPHYSIOLOGY | Facility: CLINIC | Age: 65
End: 2020-05-13
Payer: COMMERCIAL

## 2020-05-13 PROCEDURE — G2066: CPT

## 2020-05-13 PROCEDURE — 93298 REM INTERROG DEV EVAL SCRMS: CPT

## 2020-05-14 ENCOUNTER — TRANSCRIPTION ENCOUNTER (OUTPATIENT)
Age: 65
End: 2020-05-14

## 2020-05-18 ENCOUNTER — APPOINTMENT (OUTPATIENT)
Dept: OTOLARYNGOLOGY | Facility: CLINIC | Age: 65
End: 2020-05-18

## 2020-06-19 ENCOUNTER — APPOINTMENT (OUTPATIENT)
Dept: ELECTROPHYSIOLOGY | Facility: CLINIC | Age: 65
End: 2020-06-19
Payer: COMMERCIAL

## 2020-06-19 PROCEDURE — 93298 REM INTERROG DEV EVAL SCRMS: CPT

## 2020-06-19 PROCEDURE — G2066: CPT

## 2020-07-23 ENCOUNTER — APPOINTMENT (OUTPATIENT)
Dept: ELECTROPHYSIOLOGY | Facility: CLINIC | Age: 65
End: 2020-07-23
Payer: COMMERCIAL

## 2020-07-23 PROCEDURE — 93298 REM INTERROG DEV EVAL SCRMS: CPT

## 2020-07-23 PROCEDURE — G2066: CPT

## 2020-08-27 ENCOUNTER — APPOINTMENT (OUTPATIENT)
Dept: ELECTROPHYSIOLOGY | Facility: CLINIC | Age: 65
End: 2020-08-27
Payer: COMMERCIAL

## 2020-08-27 PROCEDURE — G2066: CPT

## 2020-08-27 PROCEDURE — 93298 REM INTERROG DEV EVAL SCRMS: CPT

## 2020-10-01 ENCOUNTER — APPOINTMENT (OUTPATIENT)
Dept: ELECTROPHYSIOLOGY | Facility: CLINIC | Age: 65
End: 2020-10-01
Payer: COMMERCIAL

## 2020-10-01 PROCEDURE — 93298 REM INTERROG DEV EVAL SCRMS: CPT

## 2020-10-01 PROCEDURE — G2066: CPT

## 2020-10-12 NOTE — ED PROVIDER NOTE - HISTORY ATTESTATION, MLM
I have reviewed and confirmed nurses' notes...
Action 4: Continue
Treatment 1: Enbrel 50 mg injected every week
Detail Level: Zone

## 2020-10-15 ENCOUNTER — APPOINTMENT (OUTPATIENT)
Dept: ELECTROPHYSIOLOGY | Facility: CLINIC | Age: 65
End: 2020-10-15

## 2020-10-22 ENCOUNTER — RESULT REVIEW (OUTPATIENT)
Age: 65
End: 2020-10-22

## 2020-11-05 ENCOUNTER — APPOINTMENT (OUTPATIENT)
Dept: ELECTROPHYSIOLOGY | Facility: CLINIC | Age: 65
End: 2020-11-05
Payer: COMMERCIAL

## 2020-11-05 PROCEDURE — 93298 REM INTERROG DEV EVAL SCRMS: CPT

## 2020-11-05 PROCEDURE — G2066: CPT

## 2020-12-01 NOTE — ED ADULT NURSE NOTE - NS ED NOTE ABUSE SUSPICION NEGLECT YN
What Type Of Note Output Would You Prefer (Optional)?: Standard Output Hpi Title: Evaluation of a Skin Lesion How Severe Are Your Spot(S)?: moderate Have Your Spot(S) Been Treated In The Past?: has not been treated Additional History: 98.0 temp No

## 2020-12-09 ENCOUNTER — APPOINTMENT (OUTPATIENT)
Dept: ELECTROPHYSIOLOGY | Facility: CLINIC | Age: 65
End: 2020-12-09
Payer: COMMERCIAL

## 2020-12-09 PROCEDURE — G2066: CPT

## 2020-12-09 PROCEDURE — 93298 REM INTERROG DEV EVAL SCRMS: CPT

## 2020-12-17 ENCOUNTER — APPOINTMENT (OUTPATIENT)
Dept: ELECTROPHYSIOLOGY | Facility: CLINIC | Age: 65
End: 2020-12-17

## 2021-01-14 ENCOUNTER — APPOINTMENT (OUTPATIENT)
Dept: ELECTROPHYSIOLOGY | Facility: CLINIC | Age: 66
End: 2021-01-14
Payer: COMMERCIAL

## 2021-01-14 PROCEDURE — G2066: CPT

## 2021-01-14 PROCEDURE — 93298 REM INTERROG DEV EVAL SCRMS: CPT

## 2021-02-15 NOTE — ED ADULT NURSE NOTE - CAS DISCH ACCOMP BY
Chief Complaint   Patient presents with    Injection     Patient in office today for T injection only; tolerated well with no complications. Wife was present during appt to show how to administer medication. All questions answered and injection site provided,advised to alt injection sites. Advised to call nurse with any questions or concerns. Pt next injection is in 2 wks. Spouse/Family

## 2021-02-18 ENCOUNTER — APPOINTMENT (OUTPATIENT)
Dept: ELECTROPHYSIOLOGY | Facility: CLINIC | Age: 66
End: 2021-02-18
Payer: COMMERCIAL

## 2021-02-18 PROCEDURE — 93298 REM INTERROG DEV EVAL SCRMS: CPT

## 2021-02-18 PROCEDURE — G2066: CPT

## 2021-02-23 ENCOUNTER — NON-APPOINTMENT (OUTPATIENT)
Age: 66
End: 2021-02-23

## 2021-03-24 ENCOUNTER — NON-APPOINTMENT (OUTPATIENT)
Age: 66
End: 2021-03-24

## 2021-03-24 ENCOUNTER — APPOINTMENT (OUTPATIENT)
Dept: ELECTROPHYSIOLOGY | Facility: CLINIC | Age: 66
End: 2021-03-24
Payer: COMMERCIAL

## 2021-03-24 PROCEDURE — 93298 REM INTERROG DEV EVAL SCRMS: CPT

## 2021-03-24 PROCEDURE — G2066: CPT

## 2021-03-30 ENCOUNTER — APPOINTMENT (OUTPATIENT)
Dept: ELECTROPHYSIOLOGY | Facility: CLINIC | Age: 66
End: 2021-03-30
Payer: COMMERCIAL

## 2021-03-30 ENCOUNTER — NON-APPOINTMENT (OUTPATIENT)
Age: 66
End: 2021-03-30

## 2021-03-30 VITALS
HEART RATE: 84 BPM | HEIGHT: 69 IN | SYSTOLIC BLOOD PRESSURE: 125 MMHG | BODY MASS INDEX: 30.21 KG/M2 | WEIGHT: 204 LBS | DIASTOLIC BLOOD PRESSURE: 72 MMHG | TEMPERATURE: 99.8 F | OXYGEN SATURATION: 97 %

## 2021-03-30 PROCEDURE — 99072 ADDL SUPL MATRL&STAF TM PHE: CPT

## 2021-03-30 PROCEDURE — 93000 ELECTROCARDIOGRAM COMPLETE: CPT | Mod: 59,Q5

## 2021-03-30 PROCEDURE — 99214 OFFICE O/P EST MOD 30 MIN: CPT | Mod: 25,Q5

## 2021-03-30 RX ORDER — VERAPAMIL HYDROCHLORIDE 240 MG/1
240 TABLET ORAL DAILY
Qty: 90 | Refills: 1 | Status: ACTIVE | COMMUNITY

## 2021-03-30 RX ORDER — VERAPAMIL HYDROCHLORIDE 120 MG/1
120 CAPSULE, EXTENDED RELEASE ORAL DAILY
Qty: 90 | Refills: 3 | Status: DISCONTINUED | COMMUNITY
Start: 2019-09-24 | End: 2021-03-30

## 2021-03-30 NOTE — DISCUSSION/SUMMARY
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 However, he has also had sustained episodes of long-RP narrow complex tachycardia with rates ~158 bpm which are suggestive of SVT, likely atrial tachycardia. Considering he has had these episodes despite a higher dose of Verapamil, I felt he would do best with EP study and ablation.\par \par The risks, benefits, and alternatives of catheter ablation were discussed at length. The risks explained include, but are not limited to: pain, bleeding, infection, vascular injury, cardiac perforation and/or tamponade, valvular injury, need for permanent pacemaker, death, heart, stroke, or need for open heart surgery, all of which the risk of occurring ranges from 0.1-4%. The opportunity to ask questions was provided and all were answered to the patient's satisfaction.\par \par At this juncture, he would like to discuss with his family before pursuing EP study and ablation.\par \par Recommendations:\par - Continue Verapamil 240mg daily\par - Patient to consider EP study and ablation\par - TTE given 1 episode of NSVT.\par - Continue ILR monitoring\par \par RTC in 2 months.\par \par Erik Thakkar MD, FACC, RS\par Clinical Cardiac Electrophysiology

## 2021-03-30 NOTE — PHYSICAL EXAM
[General Appearance - Well Developed] : well developed [Normal Appearance] : normal appearance [General Appearance - Well Nourished] : well nourished [Normal Jugular Venous V Waves Present] : normal jugular venous V waves present [Respiration, Rhythm And Depth] : normal respiratory rhythm and effort [Exaggerated Use Of Accessory Muscles For Inspiration] : no accessory muscle use [Abnormal Walk] : normal gait [Skin Color & Pigmentation] : normal skin color and pigmentation [Skin Turgor] : normal skin turgor [] : no rash [Oriented To Time, Place, And Person] : oriented to person, place, and time [Impaired Insight] : insight and judgment were intact [No Anxiety] : not feeling anxious

## 2021-03-30 NOTE — REASON FOR VISIT
[Follow-Up - Clinic] : a clinic follow-up of [Supraventricular Tachycardia] : supraventricular tachycardia [FreeTextEntry1] : PCP: Dr. Walter Ingram

## 2021-03-30 NOTE — HISTORY OF PRESENT ILLNESS
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 He has had palpitations and SVT. He underwent ILR implantation on 3/20/2019 which has revealed short salvos of SVT as well as a possible episode of NSVT.\par \par Today, he reports that he had his Verapamil increased from 120mg daily to 240mg daily. He reports that he has had increasing episodes of palpitations over the past year. He states that he did not make it to any follow up appointments because his appointments kept getting cancelled.

## 2021-04-08 NOTE — ED ADULT NURSE NOTE - OBJECTIVE STATEMENT
You can keep using the fluorouracil cream every day. If you want to get less red and less irritated, you could also use the fluorouracil cream two or three times a week for 2-3 months.  
Chief complaint of hypertension. C/o sob. +Covid. Mask on patient. Pt placed in single room. Pt alert and oriented x3, respirations even and unlabored, skin warm and dry, color appropriate for ethnicity. VSS. Will continue to monitor.

## 2021-04-28 ENCOUNTER — APPOINTMENT (OUTPATIENT)
Dept: ELECTROPHYSIOLOGY | Facility: CLINIC | Age: 66
End: 2021-04-28
Payer: COMMERCIAL

## 2021-04-28 ENCOUNTER — NON-APPOINTMENT (OUTPATIENT)
Age: 66
End: 2021-04-28

## 2021-04-28 PROCEDURE — G2066: CPT

## 2021-04-28 PROCEDURE — 93298 REM INTERROG DEV EVAL SCRMS: CPT

## 2021-05-22 ENCOUNTER — EMERGENCY (EMERGENCY)
Facility: HOSPITAL | Age: 66
LOS: 1 days | Discharge: HOME | End: 2021-05-22
Attending: EMERGENCY MEDICINE
Payer: COMMERCIAL

## 2021-05-22 VITALS
OXYGEN SATURATION: 98 % | WEIGHT: 195.11 LBS | HEART RATE: 110 BPM | RESPIRATION RATE: 18 BRPM | TEMPERATURE: 99 F | DIASTOLIC BLOOD PRESSURE: 94 MMHG | SYSTOLIC BLOOD PRESSURE: 186 MMHG | HEIGHT: 69 IN

## 2021-05-22 DIAGNOSIS — Z98.890 OTHER SPECIFIED POSTPROCEDURAL STATES: Chronic | ICD-10-CM

## 2021-05-22 LAB
ALBUMIN SERPL ELPH-MCNC: 4.3 G/DL — SIGNIFICANT CHANGE UP (ref 3.3–5.2)
ALP SERPL-CCNC: 99 U/L — SIGNIFICANT CHANGE UP (ref 40–120)
ALT FLD-CCNC: 45 U/L — HIGH
ANION GAP SERPL CALC-SCNC: 11 MMOL/L — SIGNIFICANT CHANGE UP (ref 5–17)
AST SERPL-CCNC: 29 U/L — SIGNIFICANT CHANGE UP
BASOPHILS # BLD AUTO: 0.02 K/UL — SIGNIFICANT CHANGE UP (ref 0–0.2)
BASOPHILS NFR BLD AUTO: 0.2 % — SIGNIFICANT CHANGE UP (ref 0–2)
BILIRUB SERPL-MCNC: 0.3 MG/DL — LOW (ref 0.4–2)
BUN SERPL-MCNC: 13 MG/DL — SIGNIFICANT CHANGE UP (ref 8–20)
CALCIUM SERPL-MCNC: 9.3 MG/DL — SIGNIFICANT CHANGE UP (ref 8.6–10.2)
CHLORIDE SERPL-SCNC: 100 MMOL/L — SIGNIFICANT CHANGE UP (ref 98–107)
CO2 SERPL-SCNC: 25 MMOL/L — SIGNIFICANT CHANGE UP (ref 22–29)
CREAT SERPL-MCNC: 1.19 MG/DL — SIGNIFICANT CHANGE UP (ref 0.5–1.3)
EOSINOPHIL # BLD AUTO: 0 K/UL — SIGNIFICANT CHANGE UP (ref 0–0.5)
EOSINOPHIL NFR BLD AUTO: 0 % — SIGNIFICANT CHANGE UP (ref 0–6)
GLUCOSE SERPL-MCNC: 184 MG/DL — HIGH (ref 70–99)
HCT VFR BLD CALC: 46.4 % — SIGNIFICANT CHANGE UP (ref 39–50)
HGB BLD-MCNC: 15 G/DL — SIGNIFICANT CHANGE UP (ref 13–17)
IMM GRANULOCYTES NFR BLD AUTO: 0.3 % — SIGNIFICANT CHANGE UP (ref 0–1.5)
LYMPHOCYTES # BLD AUTO: 1.52 K/UL — SIGNIFICANT CHANGE UP (ref 1–3.3)
LYMPHOCYTES # BLD AUTO: 14.2 % — SIGNIFICANT CHANGE UP (ref 13–44)
MAGNESIUM SERPL-MCNC: 2.1 MG/DL — SIGNIFICANT CHANGE UP (ref 1.6–2.6)
MCHC RBC-ENTMCNC: 28.4 PG — SIGNIFICANT CHANGE UP (ref 27–34)
MCHC RBC-ENTMCNC: 32.3 GM/DL — SIGNIFICANT CHANGE UP (ref 32–36)
MCV RBC AUTO: 87.9 FL — SIGNIFICANT CHANGE UP (ref 80–100)
MONOCYTES # BLD AUTO: 0.46 K/UL — SIGNIFICANT CHANGE UP (ref 0–0.9)
MONOCYTES NFR BLD AUTO: 4.3 % — SIGNIFICANT CHANGE UP (ref 2–14)
NEUTROPHILS # BLD AUTO: 8.67 K/UL — HIGH (ref 1.8–7.4)
NEUTROPHILS NFR BLD AUTO: 81 % — HIGH (ref 43–77)
NT-PROBNP SERPL-SCNC: 48 PG/ML — SIGNIFICANT CHANGE UP (ref 0–300)
PLATELET # BLD AUTO: 199 K/UL — SIGNIFICANT CHANGE UP (ref 150–400)
POTASSIUM SERPL-MCNC: 4.3 MMOL/L — SIGNIFICANT CHANGE UP (ref 3.5–5.3)
POTASSIUM SERPL-SCNC: 4.3 MMOL/L — SIGNIFICANT CHANGE UP (ref 3.5–5.3)
PROT SERPL-MCNC: 7.4 G/DL — SIGNIFICANT CHANGE UP (ref 6.6–8.7)
RBC # BLD: 5.28 M/UL — SIGNIFICANT CHANGE UP (ref 4.2–5.8)
RBC # FLD: 13.5 % — SIGNIFICANT CHANGE UP (ref 10.3–14.5)
SODIUM SERPL-SCNC: 136 MMOL/L — SIGNIFICANT CHANGE UP (ref 135–145)
TROPONIN T SERPL-MCNC: <0.01 NG/ML — SIGNIFICANT CHANGE UP (ref 0–0.06)
TROPONIN T SERPL-MCNC: <0.01 NG/ML — SIGNIFICANT CHANGE UP (ref 0–0.06)
WBC # BLD: 10.7 K/UL — HIGH (ref 3.8–10.5)
WBC # FLD AUTO: 10.7 K/UL — HIGH (ref 3.8–10.5)

## 2021-05-22 PROCEDURE — 99220: CPT

## 2021-05-22 PROCEDURE — 93010 ELECTROCARDIOGRAM REPORT: CPT | Mod: 76

## 2021-05-22 PROCEDURE — 71045 X-RAY EXAM CHEST 1 VIEW: CPT | Mod: 26

## 2021-05-22 RX ORDER — FAMOTIDINE 10 MG/ML
20 INJECTION INTRAVENOUS ONCE
Refills: 0 | Status: COMPLETED | OUTPATIENT
Start: 2021-05-22 | End: 2021-05-22

## 2021-05-22 RX ORDER — PANTOPRAZOLE SODIUM 20 MG/1
40 TABLET, DELAYED RELEASE ORAL
Refills: 0 | Status: DISCONTINUED | OUTPATIENT
Start: 2021-05-22 | End: 2021-05-27

## 2021-05-22 RX ORDER — HYDROCHLOROTHIAZIDE 25 MG
25 TABLET ORAL DAILY
Refills: 0 | Status: DISCONTINUED | OUTPATIENT
Start: 2021-05-22 | End: 2021-05-27

## 2021-05-22 RX ORDER — ASPIRIN/CALCIUM CARB/MAGNESIUM 324 MG
162 TABLET ORAL ONCE
Refills: 0 | Status: COMPLETED | OUTPATIENT
Start: 2021-05-22 | End: 2021-05-22

## 2021-05-22 RX ORDER — VERAPAMIL HCL 240 MG
240 CAPSULE, EXTENDED RELEASE PELLETS 24 HR ORAL DAILY
Refills: 0 | Status: DISCONTINUED | OUTPATIENT
Start: 2021-05-22 | End: 2021-05-27

## 2021-05-22 RX ORDER — ATORVASTATIN CALCIUM 80 MG/1
10 TABLET, FILM COATED ORAL AT BEDTIME
Refills: 0 | Status: DISCONTINUED | OUTPATIENT
Start: 2021-05-22 | End: 2021-05-27

## 2021-05-22 RX ADMIN — ATORVASTATIN CALCIUM 10 MILLIGRAM(S): 80 TABLET, FILM COATED ORAL at 21:45

## 2021-05-22 RX ADMIN — FAMOTIDINE 20 MILLIGRAM(S): 10 INJECTION INTRAVENOUS at 21:45

## 2021-05-22 RX ADMIN — Medication 162 MILLIGRAM(S): at 21:45

## 2021-05-22 RX ADMIN — Medication 0.2 MILLIGRAM(S): at 21:45

## 2021-05-22 NOTE — ED ADULT TRIAGE NOTE - CHIEF COMPLAINT QUOTE
Patient ambulated into ED with steady gait, Pt c/o left sided chest pain on and of since this am, went to MD was sent to ED. No N/V.

## 2021-05-22 NOTE — ED PROVIDER NOTE - NS ED ROS FT
Constitutional: (-) fever  (-)chills  (-)sweats  Eyes/ENT: (-)   Cardiovascular: (+) chest pain, (+) palpitations (-) edema   Respiratory: (-) cough, (-) shortness of breath   Gastrointestinal: (-)nausea  (-)vomiting, (-) diarrhea  (-) abdominal pain   :  (-)dysuria, (-)frequency, (-)urgency, (-)hematuria  Musculoskeletal: (-) neck pain, (-) back pain, (-) joint pain  Integumentary: (-) rash, (-) edema  Neurological: (-) headache, (-) altered mental status  (-)LOC  Psych:   Endo:

## 2021-05-22 NOTE — ED PROVIDER NOTE - CLINICAL SUMMARY MEDICAL DECISION MAKING FREE TEXT BOX
patient with chest pain and palpitations, in setting of elevated bp and heart rate, now resolved.  will place in obs for cardiac monitor and [facility] cardilogy eval.

## 2021-05-22 NOTE — ED CDU PROVIDER INITIAL DAY NOTE - PHYSICAL EXAMINATION
Const: AOX3 nontoxic appearing, no apparent respiratory or physical distress. Stable gait   HEENT: NC/AT. Moist mucous membranes.  Eyes: PHILIP. EOMI  Neck: Soft and supple. Full ROM without pain.  Cardiac: Regular rate and regular rhythm. +S1/S2. No murmurs. Peripheral pulses 2+ and symmetric. No LE edema.  Resp: Speaking in full sentences. No evidence of respiratory distress. No wheezes, rales or rhonchi. No adventitious breath sounds   Abd: Soft, non-tender, non-distended. Normal bowel sounds in all 4 quadrants. No guarding or rebound.  Back: Spine midline and non-tender. No CVAT.  Skin: No evidence of rashes, abrasions or lacerations.  Neuro: Awake, alert & oriented x 3. Moves all extremities symmetrically.

## 2021-05-22 NOTE — ED PROVIDER NOTE - OBJECTIVE STATEMENT
66yoM; with pmh signif for HTN, HLD, NSSVT (s/p Loop Recorder, followed by Dr. Thakkar and Dr. Oreilly); now p/w chest pain--sscp, non-radiating, lasting 20-30min, x3-4 episodes.  associated with bilateral upper extremity paresthesia, palpitations. states he was hypertensive and tachycardic during these episodes.  denies nausea. denies vomiting. denies sob. denies cough. denies f/c/s. denies sick contacts. denies trauma.    PMH: HTN, HLD, NSSVT (s/p Loop Recorder, followed by Dr. Thakkar and Dr. Oreilly)  SOCIAL: No tobacco/illicit substance use

## 2021-05-22 NOTE — ED CDU PROVIDER INITIAL DAY NOTE - FAMILY HISTORY
Father  Still living? Unknown  Family history of coronary artery disease, Age at diagnosis: Age Unknown     Mother  Still living? No  Family history of coronary artery disease, Age at diagnosis: Age Unknown

## 2021-05-22 NOTE — ED CDU PROVIDER INITIAL DAY NOTE - OBJECTIVE STATEMENT
66yoMale with pmh sig  for HTN, HLD, NSSVT (s/p Loop Recorder, followed by Dr. Thakkar and Dr. Oreilly); now p/w chest on and off on the left side of the chest , non-radiating, lasting 20-30min, x3-4 episodes.  chest pain started on 2am today associated with bilateral upper extremity paresthesia, palpitations. pain is rated 5/10 burning like pain after having the salty meal . then her wife given 81 mg baby asa with anti acid . had f.u in am in clinic the recommended to sen the pt in ER .  pt states his pcp is been told him his borderline DM not on med

## 2021-05-22 NOTE — ED CDU PROVIDER INITIAL DAY NOTE - ATTENDING CONTRIBUTION TO CARE
66yoMale with pmh sig  for HTN, HLD, NSSVT (s/p Loop Recorder, followed by Dr. Thakkar and Dr. Oreilly); now p/w chest on and off on the left side of the chest , non-radiating, lasting 20-30min, x3-4 episodes , burning   serial trop and Ekg x 2   SSC called out by primary team    continue telemetry

## 2021-05-22 NOTE — ED ADULT NURSE NOTE - OBJECTIVE STATEMENT
Pt. presents alert and oriented x 4 to ED complaining of 6/10 midsternal chest pain since this AM. Pt. went to MD today, had an EKG done there, and was sent to ED for further eval. Pt. has cardiac hx, has loop recorder in due to hx of palpitations and was told he has had several small episodes of ventricular tachycardia. Pt. follows with cardiology and is pending a possible ablation as per pt. Pt. states he had a TTE and NM Stress Test ~3 years ago. Pt. denies ever having an angiogram or stents. Pt. denies SOB, dizziness, syncope, N/V/D, falls, headache. Pt. denies any palpitations at this time. Pt. is ambulatory, LARA, skin warm and dry, cap refill < 2 seconds, pt. in NSR on cardiac monitor, normotensive, VSS.

## 2021-05-23 VITALS
RESPIRATION RATE: 20 BRPM | DIASTOLIC BLOOD PRESSURE: 81 MMHG | OXYGEN SATURATION: 99 % | HEART RATE: 87 BPM | TEMPERATURE: 98 F | SYSTOLIC BLOOD PRESSURE: 153 MMHG

## 2021-05-23 DIAGNOSIS — I47.1 SUPRAVENTRICULAR TACHYCARDIA: ICD-10-CM

## 2021-05-23 DIAGNOSIS — I10 ESSENTIAL (PRIMARY) HYPERTENSION: ICD-10-CM

## 2021-05-23 DIAGNOSIS — R07.9 CHEST PAIN, UNSPECIFIED: ICD-10-CM

## 2021-05-23 DIAGNOSIS — E78.5 HYPERLIPIDEMIA, UNSPECIFIED: ICD-10-CM

## 2021-05-23 LAB — TROPONIN T SERPL-MCNC: <0.01 NG/ML — SIGNIFICANT CHANGE UP (ref 0–0.06)

## 2021-05-23 PROCEDURE — G0378: CPT

## 2021-05-23 PROCEDURE — 93005 ELECTROCARDIOGRAM TRACING: CPT

## 2021-05-23 PROCEDURE — 71045 X-RAY EXAM CHEST 1 VIEW: CPT

## 2021-05-23 PROCEDURE — 36000 PLACE NEEDLE IN VEIN: CPT

## 2021-05-23 PROCEDURE — 83735 ASSAY OF MAGNESIUM: CPT

## 2021-05-23 PROCEDURE — 99285 EMERGENCY DEPT VISIT HI MDM: CPT

## 2021-05-23 PROCEDURE — 99284 EMERGENCY DEPT VISIT MOD MDM: CPT | Mod: 25

## 2021-05-23 PROCEDURE — 85025 COMPLETE CBC W/AUTO DIFF WBC: CPT

## 2021-05-23 PROCEDURE — 84484 ASSAY OF TROPONIN QUANT: CPT

## 2021-05-23 PROCEDURE — 99217: CPT

## 2021-05-23 PROCEDURE — 36415 COLL VENOUS BLD VENIPUNCTURE: CPT

## 2021-05-23 PROCEDURE — 80053 COMPREHEN METABOLIC PANEL: CPT

## 2021-05-23 PROCEDURE — 93010 ELECTROCARDIOGRAM REPORT: CPT

## 2021-05-23 PROCEDURE — 83880 ASSAY OF NATRIURETIC PEPTIDE: CPT

## 2021-05-23 RX ADMIN — Medication 25 MILLIGRAM(S): at 05:23

## 2021-05-23 RX ADMIN — Medication 240 MILLIGRAM(S): at 05:23

## 2021-05-23 RX ADMIN — PANTOPRAZOLE SODIUM 40 MILLIGRAM(S): 20 TABLET, DELAYED RELEASE ORAL at 07:36

## 2021-05-23 NOTE — ED CDU PROVIDER DISPOSITION NOTE - CARE PROVIDER_API CALL
Alvino Sheehan)  Cardiovascular Disease  [address], [place] [address]  Phone: [phone]  Fax: [phone]  Follow Up Time:

## 2021-05-23 NOTE — ED CDU PROVIDER SUBSEQUENT DAY NOTE - MEDICAL DECISION MAKING DETAILS
66yoMale with pmh sig  for HTN, HLD, NSSVT (s/p Loop Recorder, followed by Dr. Thakkar and Dr. Oreilly); now p/w chest on and off on the left side of the chest , non-radiating, lasting 20-30min, x3-4 episodes , burning   serial trop and Ekg x 3 negative    continue telemetry

## 2021-05-23 NOTE — CONSULT NOTE ADULT - PROBLEM SELECTOR RECOMMENDATION 2
-a few recent episodes of palpitations  -continue Verapamil  -f/u with EP regarding possible ablation

## 2021-05-23 NOTE — ED CDU PROVIDER DISPOSITION NOTE - PATIENT PORTAL LINK FT
You can access the FollowMyHealth Patient Portal offered by [facility] by registering at the following website: [email or web address]. By joining [employer]’s FollowMyHealth portal, you will also be able to view your health information using other applications (apps) compatible with our system.

## 2021-05-23 NOTE — ED CDU PROVIDER SUBSEQUENT DAY NOTE - HISTORY
pt has no complaint or concern over night   trop x 3 negative EKG W.o any acute changes  , seen by SSC NP pending attending . continue telemetry

## 2021-05-23 NOTE — CONSULT NOTE ADULT - ATTENDING COMMENTS
Pt is seen, examined, chart reviewed, d/w np/pa.  Management as outlined above.  Chest pain, unspecified type: resolved. EKG no acute changes, trop negative. Outpt stress test  SVT: Cont verapamil. f/u with EP as outpatient for possible ablation.

## 2021-05-23 NOTE — ED CDU PROVIDER SUBSEQUENT DAY NOTE - ATTENDING CONTRIBUTION TO CARE
I, Johnson Curry, performed the initial face to face bedside interview with this patient regarding history of present illness, review of symptoms and relevant past medical, social and family history.  I completed an independent physical examination.  I was the initial provider who evaluated this patient. I have signed out the follow up of any pending tests (i.e. labs, radiological studies) to the ACP.  I have communicated the patient’s plan of care and disposition with the ACP.

## 2021-05-23 NOTE — CONSULT NOTE ADULT - SUBJECTIVE AND OBJECTIVE BOX
History obtained by: Patient and medical record     obtained: No    Chief complaint:  "My chest was hurting"    HPI:  This is 67 y/o male with hx of HTN, HLD, paroxysmal SVT s/p ILR (3/2019) who presents with midsternal, non-radiating, intermittent chest pain that started yesterday. Pt had a few episodes that lasted about 20-30 minutes. Pt also had a single episode of b/l arm tingling but denies SOB or palpitations. EKG shows SR with no acute ST changes. Troponin negative x2, proBNP normal. Last stress test in 3/2019 was a normal study and it showed normal EF. Pt follows with Dr. Sheehan for general cardiology and Dr. Thakkar for EP. His loop recorder was interrogated 2 months ago and it showed multiple episodes of SVT and a single episode of NSVT.       REVIEW OF SYMPTOMS:   Cardiovascular:   as per HPI  Respiratory:  denies dyspnea,   cough,    Genitourinary:  No dysuria, no hematuria;   Gastrointestinal:   No dark color stool, no melena, no diarrhea, no constipation, no abdominal pain;   Neurological: No headache, no dizziness, no slurred speech;    Psychiatric: No agitation, no anxiety.  ALL OTHER REVIEW OF SYSTEMS ARE NEGATIVE.    MEDICATIONS  (home):  atorvastatin 10 milliGRAM(s) Oral at bedtime  cloNIDine 0.2 milliGRAM(s) Oral <User Schedule>  hydrochlorothiazide 25 milliGRAM(s) Oral daily  pantoprazole    Tablet 40 milliGRAM(s) Oral before breakfast as needed  verapamil  milliGRAM(s) Oral daily          PAST MEDICAL & SURGICAL HISTORY:  GERD (gastroesophageal reflux disease)    High cholesterol    Palpitations    Hypertension    History of arthroscopy of both shoulders        FAMILY HISTORY:  Family history of coronary artery disease (Father, Mother)        SOCIAL HISTORY: retired last year, lives with wife    CIGARETTES: never smoked    ALCOHOL: denies    DRUGS: denies    Vital Signs Last 24 Hrs  T(C): 36.9 (22 May 2021 20:57), Max: 37.3 (22 May 2021 19:18)  T(F): 98.5 (22 May 2021 20:57), Max: 99.1 (22 May 2021 19:18)  HR: 73 (22 May 2021 20:57) (73 - 110)  BP: 151/82 (22 May 2021 20:57) (149/71 - 186/94)  BP(mean): --  RR: 18 (22 May 2021 20:57) (18 - 18)  SpO2: 98% (22 May 2021 20:57) (98% - 100%)    PHYSICAL EXAM:  General: WN/WD NAD  Neurology: A&Ox3, nonfocal, LARA x 4  Eyes: PERRL/ EOMI, Gross vision intact  ENT/Neck: Neck supple, trachea midline, No JVD, Gross hearing intact  Respiratory: CTA B/L, No wheezing, rales, rhonchi  CV: RRR, S1S2, no murmurs, rubs or gallops  Abdominal: Soft, NT, ND +BS,   Extremities: No edema, + peripheral pulses  Skin: No Rashes, Hematoma, Ecchymosis        INTERPRETATION OF TELEMETRY: SR 70's, no events    ECG: SR 64 bpm, no acute ST changes      I&O's Detail      LABS:                        15.0   10.70 )-----------( 199      ( 22 May 2021 19:14 )             46.4     05-22    136  |  100  |  13.0  ----------------------------<  184<H>  4.3   |  25.0  |  1.19    Ca    9.3      22 May 2021 19:14  Mg     2.1     05-22    TPro  7.4  /  Alb  4.3  /  TBili  0.3<L>  /  DBili  x   /  AST  29  /  ALT  45<H>  /  AlkPhos  99  05-22    CARDIAC MARKERS ( 22 May 2021 22:55 )  x     / <0.01 ng/mL / x     / x     / x      CARDIAC MARKERS ( 22 May 2021 19:14 )  x     / <0.01 ng/mL / x     / x     / x              I&O's Summary      RADIOLOGY & ADDITIONAL STUDIES:  X-ray:    PREVIOUS DIAGNOSTIC TESTING:      ECHO: n/a    STRESS: n/a     CATHETERIZATION: n/a

## 2021-05-23 NOTE — CONSULT NOTE ADULT - ASSESSMENT
This is 67 y/o male with hx of HTN, HLD, paroxysmal SVT s/p ILR (3/2019) who presents with midsternal, non-radiating, intermittent chest pain that started yesterday. Pt had a few episodes that lasted about 20-30 minutes. Pt also had a single episode of b/l arm tingling but denies SOB or palpitations. EKG shows SR with no acute ST changes. Troponin negative x2, proBNP normal. Last stress test in 3/2019 was a normal study and it showed normal EF. Pt follows with Dr. Sheehan for general cardiology and Dr. Thakkar for EP. His loop recorder was interrogated 2 months ago and it showed multiple episodes of SVT and a single episode of NSVT.

## 2021-05-23 NOTE — ED ADULT NURSE REASSESSMENT NOTE - NS ED NURSE REASSESS COMMENT FT1
Pt resting comfortably in bed Denies any chest pain, shortness of breath, nausea or dizziness VSS will continue to monitor
Pt. transported to CDU, report given to CDU АНДРЕЙ Vela. Pt. safety and comfort measures maintained. Pt. maintained on tele monitor with . Handoff and transfer of care occurred @ 2048.
Vital Signs Stable, pt medically cleared for discharge, pt denies any chest discomfort. pt educated to follow up with cardiology. pt d/c in stable condition, no apparent distress noted at this time. pt A&Ox3. pt able to ambulate with steady gait. pt in no distress at d/c.
assumed care of pt @ 7359, report received from  RN. pt AOx4 in NAD, denies any chest discomfort or SOB at this time. respirations even and unlabored. abd soft and nondistended. skin WNL for race. awaiting cardiology consult. safety maintained.
Assumed care of the Pt at 1930 verbal report received from АНДРЕЙ Bateman. Pt is AOx4 in no acute distress. Pt Denies any chest pain, shortness of breath, nausea or dizziness at this time. Pt states he has intermittent periods of chest pain. PIV patent VSS. Pt is NSR on CM as per monitor tech. Pt given call bell, call bell within reach. pt instructed to use call bell when assistance is needed. Pt pending Rpt Trop and EKG. No complaints for RN at this time. Plan of care explained, wait time explained, Pt in understanding of plan of care will continue to monitor.

## 2021-05-23 NOTE — ED CDU PROVIDER SUBSEQUENT DAY NOTE - PHYSICAL EXAMINATION
Const: AOX3 nontoxic appearing, no apparent respiratory or physical distress. Stable gait   HEENT: NC/AT. Moist mucous membranes.  Eyes: PIHLIP. EOMI  Neck: Soft and supple. Full ROM without pain.  Cardiac: Regular rate and regular rhythm. +S1/S2. No murmurs. Peripheral pulses 2+ and symmetric. No LE edema.  Resp: Speaking in full sentences. No evidence of respiratory distress. No wheezes, rales or rhonchi. No adventitious breath sounds   Abd: Soft, non-tender, non-distended. Normal bowel sounds in all 4 quadrants. No guarding or rebound.  Back: Spine midline and non-tender. No CVAT.  Skin: No evidence of rashes, abrasions or lacerations.  Neuro: Awake, alert & oriented x 3. Moves all extremities symmetrically.

## 2021-05-23 NOTE — ED CDU PROVIDER DISPOSITION NOTE - NSFOLLOWUPCLINICS_GEN_ALL_ED_FT
[facility]  Cardiology  [address] [place] [address]  Phone: [phone]  Fax:

## 2021-06-02 ENCOUNTER — NON-APPOINTMENT (OUTPATIENT)
Age: 66
End: 2021-06-02

## 2021-06-02 ENCOUNTER — APPOINTMENT (OUTPATIENT)
Dept: ELECTROPHYSIOLOGY | Facility: CLINIC | Age: 66
End: 2021-06-02
Payer: COMMERCIAL

## 2021-06-02 PROCEDURE — G2066: CPT

## 2021-06-02 PROCEDURE — 93298 REM INTERROG DEV EVAL SCRMS: CPT

## 2021-06-04 ENCOUNTER — APPOINTMENT (OUTPATIENT)
Dept: CARDIOLOGY | Facility: CLINIC | Age: 66
End: 2021-06-04
Payer: COMMERCIAL

## 2021-06-04 PROCEDURE — 93306 TTE W/DOPPLER COMPLETE: CPT

## 2021-06-15 ENCOUNTER — NON-APPOINTMENT (OUTPATIENT)
Age: 66
End: 2021-06-15

## 2021-06-15 ENCOUNTER — APPOINTMENT (OUTPATIENT)
Dept: ELECTROPHYSIOLOGY | Facility: CLINIC | Age: 66
End: 2021-06-15
Payer: COMMERCIAL

## 2021-06-15 VITALS
HEART RATE: 81 BPM | DIASTOLIC BLOOD PRESSURE: 70 MMHG | BODY MASS INDEX: 29.33 KG/M2 | OXYGEN SATURATION: 98 % | SYSTOLIC BLOOD PRESSURE: 130 MMHG | WEIGHT: 198 LBS | HEIGHT: 69 IN

## 2021-06-15 DIAGNOSIS — I47.2 VENTRICULAR TACHYCARDIA: ICD-10-CM

## 2021-06-15 DIAGNOSIS — I47.1 SUPRAVENTRICULAR TACHYCARDIA: ICD-10-CM

## 2021-06-15 DIAGNOSIS — R00.2 PALPITATIONS: ICD-10-CM

## 2021-06-15 PROCEDURE — 93000 ELECTROCARDIOGRAM COMPLETE: CPT

## 2021-06-15 PROCEDURE — 99214 OFFICE O/P EST MOD 30 MIN: CPT

## 2021-06-15 NOTE — DISCUSSION/SUMMARY
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 He has had palpitations and SVT. He underwent ILR implantation on 3/20/2019 which has revealed short salvos of SVT as well as a possible episode of NSVT.\par \par During last follow up EP study/ablation were considered, patient wished to discuss with family. Echocardiogram was recommended due to NSVT noted on remote monitoring. Echo performed 6/4/21 revealed EF 60-65%. \par  \par Since last follow up he did present to [facility] ER with episode of chest pain. Reports sudden onset chest discomfort which did not worsen with exertion, lasted 24 hours. He was discharged with plans for outpatient stress test which is pending. ILR has continued to reveal short salvos of SVT correlating with patient symptoms. During episodes he reports he feels heart racing, and slight SOB lasting seconds. The ILR has revealed no sustained arrhythmias since last f/u. He has had no chest pain since hospitalization. \par \par Recommendation: \par \par -We again discussed catheter ablation for symptomatic SVT. Although his episodes are non-sustained he does feel them daily and they are bothersome. The risks, benefits, and alternatives of catheter ablation were discussed. He wishes to consider after stress testing. \par -To continue remote ILR monitoring for evidence of sustained arrhythmias. Continue cardiology f/u with Dr. Sheehan. EP follow up PRN. He will call us if wishes to move forward with ablation. \par \par Johnna Jacob ANP-C  yes...

## 2021-06-15 NOTE — REVIEW OF SYSTEMS
[SOB] : shortness of breath [Palpitations] : palpitations [Headache] : no headache [Feeling Fatigued] : not feeling fatigued [Dyspnea on exertion] : not dyspnea during exertion [Lower Ext Edema] : no extremity edema [Orthopnea] : no orthopnea [Cough] : no cough [Dizziness] : no dizziness [Easy Bleeding] : no tendency for easy bleeding

## 2021-06-15 NOTE — PHYSICAL EXAM
[Well Developed] : well developed [Well Nourished] : well nourished [Normal S1, S2] : normal S1, S2 [No Murmur] : no murmur [Clear Lung Fields] : clear lung fields [Normal Gait] : normal gait [No Edema] : no edema [Alert and Oriented] : alert and oriented

## 2021-06-15 NOTE — HISTORY OF PRESENT ILLNESS
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 He has had palpitations and SVT. He underwent ILR implantation on 3/20/2019 which has revealed short salvos of SVT as well as a possible episode of NSVT.\par \par During last follow up EP study/ablation were considered, patient wished to discuss with family. Echocardiogram was recommended due to NSVT noted on remote monitoring. Echo performed 6/4/21 revealed EF 60-65%. \par  \par Since last follow up he did present to [facility] ER with episode of chest pain. Reports sudden onset chest discomfort which did not worsen with exertion, lasted 24 hours. He was discharged with plans for outpatient stress test which is pending. ILR has continued to reveal short salvos of SVT correlating with patient symptoms. During episodes he reports he feels heart racing, and slight SOB lasting seconds. The ILR has revealed no sustained arrhythmias since last f/u. He has had no chest pain since hospitalization.

## 2021-06-28 ENCOUNTER — OUTPATIENT (OUTPATIENT)
Dept: OUTPATIENT SERVICES | Facility: HOSPITAL | Age: 66
LOS: 1 days | End: 2021-06-28
Payer: COMMERCIAL

## 2021-06-28 DIAGNOSIS — R07.9 CHEST PAIN, UNSPECIFIED: ICD-10-CM

## 2021-06-28 DIAGNOSIS — I47.2 VENTRICULAR TACHYCARDIA: ICD-10-CM

## 2021-06-28 DIAGNOSIS — Z98.890 OTHER SPECIFIED POSTPROCEDURAL STATES: Chronic | ICD-10-CM

## 2021-06-28 DIAGNOSIS — I47.1 SUPRAVENTRICULAR TACHYCARDIA: ICD-10-CM

## 2021-06-28 PROCEDURE — 93016 CV STRESS TEST SUPVJ ONLY: CPT

## 2021-06-28 PROCEDURE — 78452 HT MUSCLE IMAGE SPECT MULT: CPT | Mod: 26

## 2021-06-28 PROCEDURE — 78452 HT MUSCLE IMAGE SPECT MULT: CPT

## 2021-06-28 PROCEDURE — 93018 CV STRESS TEST I&R ONLY: CPT

## 2021-06-28 PROCEDURE — 93017 CV STRESS TEST TRACING ONLY: CPT

## 2021-06-28 PROCEDURE — A9500: CPT

## 2021-07-07 ENCOUNTER — NON-APPOINTMENT (OUTPATIENT)
Age: 66
End: 2021-07-07

## 2021-07-07 ENCOUNTER — APPOINTMENT (OUTPATIENT)
Dept: ELECTROPHYSIOLOGY | Facility: CLINIC | Age: 66
End: 2021-07-07
Payer: COMMERCIAL

## 2021-07-07 PROCEDURE — 93298 REM INTERROG DEV EVAL SCRMS: CPT

## 2021-07-07 PROCEDURE — G2066: CPT

## 2021-08-11 ENCOUNTER — NON-APPOINTMENT (OUTPATIENT)
Age: 66
End: 2021-08-11

## 2021-08-11 ENCOUNTER — APPOINTMENT (OUTPATIENT)
Dept: ELECTROPHYSIOLOGY | Facility: CLINIC | Age: 66
End: 2021-08-11
Payer: COMMERCIAL

## 2021-08-11 PROCEDURE — 93298 REM INTERROG DEV EVAL SCRMS: CPT

## 2021-08-11 PROCEDURE — G2066: CPT

## 2021-09-15 ENCOUNTER — APPOINTMENT (OUTPATIENT)
Dept: ELECTROPHYSIOLOGY | Facility: CLINIC | Age: 66
End: 2021-09-15
Payer: COMMERCIAL

## 2021-09-15 ENCOUNTER — NON-APPOINTMENT (OUTPATIENT)
Age: 66
End: 2021-09-15

## 2021-09-15 PROCEDURE — 93298 REM INTERROG DEV EVAL SCRMS: CPT

## 2021-09-15 PROCEDURE — G2066: CPT

## 2021-09-24 NOTE — ED ADULT TRIAGE NOTE - CHIEF COMPLAINT QUOTE
patient states that he has a stuffy nose, can not breathe was here yesterday and home with medication
4 weeks or until cleared by your doctor

## 2021-10-20 ENCOUNTER — APPOINTMENT (OUTPATIENT)
Dept: ELECTROPHYSIOLOGY | Facility: CLINIC | Age: 66
End: 2021-10-20
Payer: COMMERCIAL

## 2021-10-20 ENCOUNTER — NON-APPOINTMENT (OUTPATIENT)
Age: 66
End: 2021-10-20

## 2021-10-20 PROCEDURE — G2066: CPT | Mod: NC

## 2021-10-20 PROCEDURE — 93298 REM INTERROG DEV EVAL SCRMS: CPT

## 2021-11-22 ENCOUNTER — APPOINTMENT (OUTPATIENT)
Dept: ELECTROPHYSIOLOGY | Facility: CLINIC | Age: 66
End: 2021-11-22
Payer: COMMERCIAL

## 2021-11-22 ENCOUNTER — NON-APPOINTMENT (OUTPATIENT)
Age: 66
End: 2021-11-22

## 2021-11-22 PROCEDURE — 93298 REM INTERROG DEV EVAL SCRMS: CPT

## 2021-11-22 PROCEDURE — G2066: CPT

## 2021-12-27 ENCOUNTER — NON-APPOINTMENT (OUTPATIENT)
Age: 66
End: 2021-12-27

## 2021-12-27 ENCOUNTER — APPOINTMENT (OUTPATIENT)
Dept: ELECTROPHYSIOLOGY | Facility: CLINIC | Age: 66
End: 2021-12-27
Payer: COMMERCIAL

## 2021-12-27 PROCEDURE — G2066: CPT

## 2021-12-27 PROCEDURE — 93298 REM INTERROG DEV EVAL SCRMS: CPT

## 2022-01-01 NOTE — ED ADULT NURSE NOTE - AS SC BRADEN MOISTURE
(4) rarely moist
Scrotal symmetry/Testes palpated in scrotum/canals with normal texture/shape and pain-free exam

## 2022-01-31 ENCOUNTER — NON-APPOINTMENT (OUTPATIENT)
Age: 67
End: 2022-01-31

## 2022-01-31 ENCOUNTER — APPOINTMENT (OUTPATIENT)
Dept: ELECTROPHYSIOLOGY | Facility: CLINIC | Age: 67
End: 2022-01-31
Payer: COMMERCIAL

## 2022-01-31 PROCEDURE — 93298 REM INTERROG DEV EVAL SCRMS: CPT

## 2022-01-31 PROCEDURE — G2066: CPT

## 2022-02-01 ENCOUNTER — APPOINTMENT (OUTPATIENT)
Dept: ELECTROPHYSIOLOGY | Facility: CLINIC | Age: 67
End: 2022-02-01
Payer: COMMERCIAL

## 2022-02-01 VITALS
HEIGHT: 69 IN | WEIGHT: 190 LBS | BODY MASS INDEX: 28.14 KG/M2 | HEART RATE: 84 BPM | DIASTOLIC BLOOD PRESSURE: 80 MMHG | OXYGEN SATURATION: 97 % | SYSTOLIC BLOOD PRESSURE: 130 MMHG | TEMPERATURE: 98.7 F

## 2022-02-01 PROCEDURE — 99214 OFFICE O/P EST MOD 30 MIN: CPT

## 2022-02-01 PROCEDURE — 93000 ELECTROCARDIOGRAM COMPLETE: CPT

## 2022-02-03 NOTE — HISTORY OF PRESENT ILLNESS
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 He has had palpitations and SVT. He underwent ILR implantation on 3/20/2019 which has revealed short salvos of SVT as well as a possible episode of NSVT.\par \par During last follow up EP study/ablation were considered, patient wished to discuss with family. Echocardiogram was recommended due to NSVT noted on remote monitoring. Echo performed 6/4/21 revealed EF 60-65%. Nuclear Stress test (6/28/21) showed normal LV function, with no evidence of ischemia or infarction. \par  \par ILR has previously shown short salvos of SVT correlating with patient symptoms. Some symptom events c/w sinus rhythm with intermittent PVCs. Since last follow-up, had two episodes of prolonged palpitations lasting several hours with associated SOB. DId not press symptom button during these events.Went to urgent care on one occasion, but EKG and work-up was reportedly unrevealing upon arrival. \par Slim dizziness, syncope, presyncope CP or SOB.

## 2022-02-03 NOTE — END OF VISIT
[Time Spent: ___ minutes] : I have spent [unfilled] minutes of time on the encounter. [FreeTextEntry3] : Mr. Kumar continues to have palpitations. Recently  most his symptoms have correlated with APCs. In the past, he has had a long RP tachycardia (mostly ~150-160 bpm) which was associated with symptoms. I discussed with Mr. Kumar that we can pursue EP study to see if he has any inducible arrhythmias to target the tachycardias that were previously seen on monitoring. However, EP study and ablation to extinguish occasional APCs is likely to not be effective. So it is possible that even after EP study, he may still have palpitations. We reviewed all risks, benefits and alternatives. After a thorough discussion and through shared decision making, the patient elected to proceed with EP study. He should hold Verapamil for 3 days before the procedure.\par \par Erik Thakkar MD, FACC, RS\par Clinical Cardiac Electrophysiology

## 2022-02-03 NOTE — REASON FOR VISIT
[Arrhythmia/ECG Abnorrmalities] : arrhythmia/ECG abnormalities [Spouse] : spouse [FreeTextEntry3] : Dr. Sheehan

## 2022-02-03 NOTE — DISCUSSION/SUMMARY
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  He has a long history of palpitations and followed by EP for several years. Underwent ILR implantation on 3/20/2019 which has revealed short salvos of SVT as well as a possible episode of NSVT. TTE  (6/4/21) revealed EF 60-65% and NST (6/28/21) showed normal LV function, with no evidence of ischemia or infarction. \par  \par As discussed, ILR has previously shown short salvos of SVT correlating with patient symptoms. While, some c/w sinus rhythm with intermittent PVCs. Has sustained narrow complex tachycardia @ 150-180bpm - although onset/offset not available and unable to correlate with symptoms or activity. \par \par Since last follow-up, had two episodes of prolonged palpitations lasting several hours with associated SOB. DId not press symptom button during events. Went to urgent care on one occasion, but EKG and work-up was reportedly unrevealing upon arrival.  Denies dizziness, syncope, presyncope CP or SOB. \par \par - We again discussed diagnostic EPS and possible catheter ablation for symptomatic SVT.\par - Although documented events were non-sustained, he recently experienced two prolonged events (although no correlation on ILR and symptom button not activated). \par - Palpitations have become increasingly bothersome despite Verpamil. Would like to proceed with EPS at this time. The risks, benefits, and alternatives of catheter ablation were discussed. \par - To continue remote ILR monitoring. Encouraged to activate symptom event if he experiences prolonged palpations or progressive symptoms. \par - Continue cardiology f/u with Dr. Sheehan. \par \par Justine Gunter PAC\par

## 2022-03-03 ENCOUNTER — NON-APPOINTMENT (OUTPATIENT)
Age: 67
End: 2022-03-03

## 2022-03-07 ENCOUNTER — NON-APPOINTMENT (OUTPATIENT)
Age: 67
End: 2022-03-07

## 2022-03-07 ENCOUNTER — APPOINTMENT (OUTPATIENT)
Dept: ELECTROPHYSIOLOGY | Facility: CLINIC | Age: 67
End: 2022-03-07
Payer: MEDICARE

## 2022-03-07 PROCEDURE — 93298 REM INTERROG DEV EVAL SCRMS: CPT

## 2022-03-07 PROCEDURE — G2066: CPT

## 2022-03-11 ENCOUNTER — OUTPATIENT (OUTPATIENT)
Dept: OUTPATIENT SERVICES | Facility: HOSPITAL | Age: 67
LOS: 1 days | End: 2022-03-11
Payer: COMMERCIAL

## 2022-03-11 VITALS
RESPIRATION RATE: 19 BRPM | DIASTOLIC BLOOD PRESSURE: 60 MMHG | HEART RATE: 75 BPM | TEMPERATURE: 98 F | HEIGHT: 69 IN | SYSTOLIC BLOOD PRESSURE: 118 MMHG | OXYGEN SATURATION: 99 % | WEIGHT: 190.7 LBS

## 2022-03-11 DIAGNOSIS — Z13.89 ENCOUNTER FOR SCREENING FOR OTHER DISORDER: ICD-10-CM

## 2022-03-11 DIAGNOSIS — Z98.890 OTHER SPECIFIED POSTPROCEDURAL STATES: Chronic | ICD-10-CM

## 2022-03-11 DIAGNOSIS — I10 ESSENTIAL (PRIMARY) HYPERTENSION: ICD-10-CM

## 2022-03-11 DIAGNOSIS — Z01.818 ENCOUNTER FOR OTHER PREPROCEDURAL EXAMINATION: ICD-10-CM

## 2022-03-11 DIAGNOSIS — R00.2 PALPITATIONS: ICD-10-CM

## 2022-03-11 DIAGNOSIS — Z29.9 ENCOUNTER FOR PROPHYLACTIC MEASURES, UNSPECIFIED: ICD-10-CM

## 2022-03-11 LAB
ALBUMIN SERPL ELPH-MCNC: 4.3 G/DL — SIGNIFICANT CHANGE UP (ref 3.3–5.2)
ALP SERPL-CCNC: 108 U/L — SIGNIFICANT CHANGE UP (ref 40–120)
ALT FLD-CCNC: 29 U/L — SIGNIFICANT CHANGE UP
ANION GAP SERPL CALC-SCNC: 13 MMOL/L — SIGNIFICANT CHANGE UP (ref 5–17)
APTT BLD: 33.3 SEC — SIGNIFICANT CHANGE UP (ref 27.5–35.5)
AST SERPL-CCNC: 22 U/L — SIGNIFICANT CHANGE UP
BASOPHILS # BLD AUTO: 0.03 K/UL — SIGNIFICANT CHANGE UP (ref 0–0.2)
BASOPHILS NFR BLD AUTO: 0.4 % — SIGNIFICANT CHANGE UP (ref 0–2)
BILIRUB SERPL-MCNC: 0.2 MG/DL — LOW (ref 0.4–2)
BLD GP AB SCN SERPL QL: SIGNIFICANT CHANGE UP
BUN SERPL-MCNC: 13.6 MG/DL — SIGNIFICANT CHANGE UP (ref 8–20)
CALCIUM SERPL-MCNC: 9.3 MG/DL — SIGNIFICANT CHANGE UP (ref 8.6–10.2)
CHLORIDE SERPL-SCNC: 98 MMOL/L — SIGNIFICANT CHANGE UP (ref 98–107)
CO2 SERPL-SCNC: 29 MMOL/L — SIGNIFICANT CHANGE UP (ref 22–29)
CREAT SERPL-MCNC: 1.1 MG/DL — SIGNIFICANT CHANGE UP (ref 0.5–1.3)
EGFR: 74 ML/MIN/1.73M2 — SIGNIFICANT CHANGE UP
EOSINOPHIL # BLD AUTO: 0.09 K/UL — SIGNIFICANT CHANGE UP (ref 0–0.5)
EOSINOPHIL NFR BLD AUTO: 1.1 % — SIGNIFICANT CHANGE UP (ref 0–6)
GLUCOSE SERPL-MCNC: 101 MG/DL — HIGH (ref 70–99)
HCT VFR BLD CALC: 47.4 % — SIGNIFICANT CHANGE UP (ref 39–50)
HGB BLD-MCNC: 15.3 G/DL — SIGNIFICANT CHANGE UP (ref 13–17)
IMM GRANULOCYTES NFR BLD AUTO: 0.1 % — SIGNIFICANT CHANGE UP (ref 0–1.5)
INR BLD: 1.05 RATIO — SIGNIFICANT CHANGE UP (ref 0.88–1.16)
LYMPHOCYTES # BLD AUTO: 3.69 K/UL — HIGH (ref 1–3.3)
LYMPHOCYTES # BLD AUTO: 43.6 % — SIGNIFICANT CHANGE UP (ref 13–44)
MAGNESIUM SERPL-MCNC: 1.8 MG/DL — SIGNIFICANT CHANGE UP (ref 1.8–2.6)
MCHC RBC-ENTMCNC: 28.3 PG — SIGNIFICANT CHANGE UP (ref 27–34)
MCHC RBC-ENTMCNC: 32.3 GM/DL — SIGNIFICANT CHANGE UP (ref 32–36)
MCV RBC AUTO: 87.8 FL — SIGNIFICANT CHANGE UP (ref 80–100)
MONOCYTES # BLD AUTO: 0.66 K/UL — SIGNIFICANT CHANGE UP (ref 0–0.9)
MONOCYTES NFR BLD AUTO: 7.8 % — SIGNIFICANT CHANGE UP (ref 2–14)
NEUTROPHILS # BLD AUTO: 3.98 K/UL — SIGNIFICANT CHANGE UP (ref 1.8–7.4)
NEUTROPHILS NFR BLD AUTO: 47 % — SIGNIFICANT CHANGE UP (ref 43–77)
PLATELET # BLD AUTO: 211 K/UL — SIGNIFICANT CHANGE UP (ref 150–400)
POTASSIUM SERPL-MCNC: 4.1 MMOL/L — SIGNIFICANT CHANGE UP (ref 3.5–5.3)
POTASSIUM SERPL-SCNC: 4.1 MMOL/L — SIGNIFICANT CHANGE UP (ref 3.5–5.3)
PROT SERPL-MCNC: 7.5 G/DL — SIGNIFICANT CHANGE UP (ref 6.6–8.7)
PROTHROM AB SERPL-ACNC: 12.2 SEC — SIGNIFICANT CHANGE UP (ref 10.5–13.4)
RBC # BLD: 5.4 M/UL — SIGNIFICANT CHANGE UP (ref 4.2–5.8)
RBC # FLD: 13.2 % — SIGNIFICANT CHANGE UP (ref 10.3–14.5)
SODIUM SERPL-SCNC: 140 MMOL/L — SIGNIFICANT CHANGE UP (ref 135–145)
WBC # BLD: 8.46 K/UL — SIGNIFICANT CHANGE UP (ref 3.8–10.5)
WBC # FLD AUTO: 8.46 K/UL — SIGNIFICANT CHANGE UP (ref 3.8–10.5)

## 2022-03-11 PROCEDURE — 93010 ELECTROCARDIOGRAM REPORT: CPT

## 2022-03-11 PROCEDURE — 93005 ELECTROCARDIOGRAM TRACING: CPT

## 2022-03-11 PROCEDURE — G0463: CPT

## 2022-03-11 RX ORDER — FAMOTIDINE 10 MG/ML
1 INJECTION INTRAVENOUS
Qty: 0 | Refills: 0 | DISCHARGE

## 2022-03-11 RX ORDER — SODIUM CHLORIDE 9 MG/ML
3 INJECTION INTRAMUSCULAR; INTRAVENOUS; SUBCUTANEOUS ONCE
Refills: 0 | Status: COMPLETED | OUTPATIENT
Start: 2022-03-16 | End: 2022-03-16

## 2022-03-11 RX ORDER — OMEPRAZOLE 10 MG/1
1 CAPSULE, DELAYED RELEASE ORAL
Qty: 0 | Refills: 0 | DISCHARGE

## 2022-03-11 RX ORDER — TADALAFIL 10 MG/1
0 TABLET, FILM COATED ORAL
Qty: 0 | Refills: 0 | DISCHARGE

## 2022-03-11 RX ORDER — IRBESARTAN 75 MG/1
1 TABLET ORAL
Qty: 0 | Refills: 0 | DISCHARGE

## 2022-03-11 RX ORDER — METOPROLOL TARTRATE 50 MG
1 TABLET ORAL
Qty: 0 | Refills: 0 | DISCHARGE

## 2022-03-11 NOTE — H&P PST ADULT - PROBLEM SELECTOR PLAN 4
Caprini score 5- Moderate risk  Surgical team assess/recommend pharmacological/mechanical measures for VTE prophylaxis

## 2022-03-11 NOTE — H&P PST ADULT - ASSESSMENT
66 y/o male scheduled for SVT ablation with Dr. Ariane Valencia on 3/16/2022  - Verbalized understanding of all instructions  - Will continue medications as prescribed unless otherwise instructed by Dr. Thakkar  - Covid swab pending  -Will hold maye and multivitamin and resumed after procedure  -Tylenol if needed for pain    OPIOID RISK TOOL    NINA EACH BOX THAT APPLIES AND ADD TOTALS AT THE END    FAMILY HISTORY OF SUBSTANCE ABUSE                 FEMALE         MALE                                                Alcohol                             [  ]1 pt          [  ]3pts                                               Illegal Durgs                     [  ]2 pts        [  ]3pts                                               Rx Drugs                           [  ]4 pts        [  ]4 pts    PERSONAL HISTORY OF SUBSTANCE ABUSE                                                                                          Alcohol                             [  ]3 pts       [  ]3 pts                                               Illegal Drugs                     [  ]4 pts        [  ]4 pts                                               Rx Drugs                           [  ]5 pts        [  ]5 pts    AGE BETWEEN 16-45 YEARS                                      [  ]1 pt         [  ]1 pt    HISTORY OF PREADOLESCENT   SEXUAL ABUSE                                                             [  ]3 pts        [  ]0pts    PSYCHOLOGICAL DISEASE                     ADD, OCD, Bipolar, Schizophrenia        [  ]2 pts         [  ]2 pts                      Depression                                               [  ]1 pt           [  ]1 pt           SCORING TOTAL   (add numbers and type here)              (0)                                     A score of 3 or lower indicated LOW risk for future opioid abuse  A score of 4 to 7 indicated moderate risk for future opioid abuse  A score of 8 or higher indicates a high risk for opioid abuse    CAPRINI SCORE [CLOT]    AGE RELATED RISK FACTORS                                                       MOBILITY RELATED FACTORS  [ ] Age 41-60 years                                            (1 Point)                  [ ] Bed rest                                                        (1 Point)  [ x] Age: 61-74 years                                           (2 Points)                 [ ] Plaster cast                                                   (2 Points)  [ ] Age= 75 years                                              (3 Points)                 [ ] Bed bound for more than 72 hours                 (2 Points)    DISEASE RELATED RISK FACTORS                                               GENDER SPECIFIC FACTORS  [ ] Edema in the lower extremities                       (1 Point)                  [ ] Pregnancy                                                     (1 Point)  [ ] Varicose veins                                               (1 Point)                  [ ] Post-partum < 6 weeks                                   (1 Point)             [ x] BMI > 25 Kg/m2                                            (1 Point)                  [ ] Hormonal therapy  or oral contraception          (1 Point)                 [ ] Sepsis (in the previous month)                        (1 Point)                  [ ] History of pregnancy complications                 (1 point)  [ ] Pneumonia or serious lung disease                                               [ ] Unexplained or recurrent                     (1 Point)           (in the previous month)                               (1 Point)  [ ] Abnormal pulmonary function test                     (1 Point)                 SURGERY RELATED RISK FACTORS  [ ] Acute myocardial infarction                              (1 Point)                 [ ]  Section                                             (1 Point)  [ ] Congestive heart failure (in the previous month)  (1 Point)               [ ] Minor surgery                                                  (1 Point)   [ ] Inflammatory bowel disease                             (1 Point)                 [ ] Arthroscopic surgery                                        (2 Points)  [ ] Central venous access                                      (2 Points)                [ x] General surgery lasting more than 45 minutes   (2 Points)       [ ] Stroke (in the previous month)                          (5 Points)               [ ] Elective arthroplasty                                         (5 Points)                                                                                                                                               HEMATOLOGY RELATED FACTORS                                                 TRAUMA RELATED RISK FACTORS  [ ] Prior episodes of VTE                                     (3 Points)                [ ] Fracture of the hip, pelvis, or leg                       (5 Points)  [ ] Positive family history for VTE                         (3 Points)                 [ ] Acute spinal cord injury (in the previous month)  (5 Points)  [ ] Prothrombin 91549 A                                     (3 Points)                 [ ] Paralysis  (less than 1 month)                             (5 Points)  [ ] Factor V Leiden                                             (3 Points)                  [ ] Multiple Trauma within 1 month                        (5 Points)  [ ] Lupus anticoagulants                                     (3 Points)                                                           [ ] Anticardiolipin antibodies                               (3 Points)                                                       [ ] High homocysteine in the blood                      (3 Points)                                             [ ] Other congenital or acquired thrombophilia      (3 Points)                                                [ ] Heparin induced thrombocytopenia                  (3 Points)                                          Total Score [  5        ]    Caprini Score 0 - 2:  Low Risk, No VTE Prophylaxis required for most patients, encourage ambulation  Caprini Score 3 - 6:  At Risk, pharmacologic VTE prophylaxis is indicated for most patients (in the absence of a contraindication)  Caprini Score Greater than or = 7:  High Risk, pharmacologic VTE prophylaxis is indicated for most patients (in the absence of a contraindication) 66 y/o male scheduled for SVT ablation with Dr. Ariane Valencia on 3/16/2022  - Verbalized understanding of all instructions  - Will continue medications as prescribed unless otherwise instructed by Dr. Thakkar  - Covid swab pending  -Will hold maye and multivitamin and resume after procedure  -Tylenol if needed for pain      OPIOID RISK TOOL    NINA EACH BOX THAT APPLIES AND ADD TOTALS AT THE END    FAMILY HISTORY OF SUBSTANCE ABUSE                 FEMALE         MALE                                                Alcohol                             [  ]1 pt          [  ]3pts                                               Illegal Durgs                     [  ]2 pts        [  ]3pts                                               Rx Drugs                           [  ]4 pts        [  ]4 pts    PERSONAL HISTORY OF SUBSTANCE ABUSE                                                                                          Alcohol                             [  ]3 pts       [  ]3 pts                                               Illegal Drugs                     [  ]4 pts        [  ]4 pts                                               Rx Drugs                           [  ]5 pts        [  ]5 pts    AGE BETWEEN 16-45 YEARS                                      [  ]1 pt         [  ]1 pt    HISTORY OF PREADOLESCENT   SEXUAL ABUSE                                                             [  ]3 pts        [  ]0pts    PSYCHOLOGICAL DISEASE                     ADD, OCD, Bipolar, Schizophrenia        [  ]2 pts         [  ]2 pts                      Depression                                               [  ]1 pt           [  ]1 pt           SCORING TOTAL   (add numbers and type here)              (0)                                     A score of 3 or lower indicated LOW risk for future opioid abuse  A score of 4 to 7 indicated moderate risk for future opioid abuse  A score of 8 or higher indicates a high risk for opioid abuse    CAPRINI SCORE [CLOT]    AGE RELATED RISK FACTORS                                                       MOBILITY RELATED FACTORS  [ ] Age 41-60 years                                            (1 Point)                  [ ] Bed rest                                                        (1 Point)  [ x] Age: 61-74 years                                           (2 Points)                 [ ] Plaster cast                                                   (2 Points)  [ ] Age= 75 years                                              (3 Points)                 [ ] Bed bound for more than 72 hours                 (2 Points)    DISEASE RELATED RISK FACTORS                                               GENDER SPECIFIC FACTORS  [ ] Edema in the lower extremities                       (1 Point)                  [ ] Pregnancy                                                     (1 Point)  [ ] Varicose veins                                               (1 Point)                  [ ] Post-partum < 6 weeks                                   (1 Point)             [ x] BMI > 25 Kg/m2                                            (1 Point)                  [ ] Hormonal therapy  or oral contraception          (1 Point)                 [ ] Sepsis (in the previous month)                        (1 Point)                  [ ] History of pregnancy complications                 (1 point)  [ ] Pneumonia or serious lung disease                                               [ ] Unexplained or recurrent                     (1 Point)           (in the previous month)                               (1 Point)  [ ] Abnormal pulmonary function test                     (1 Point)                 SURGERY RELATED RISK FACTORS  [ ] Acute myocardial infarction                              (1 Point)                 [ ]  Section                                             (1 Point)  [ ] Congestive heart failure (in the previous month)  (1 Point)               [ ] Minor surgery                                                  (1 Point)   [ ] Inflammatory bowel disease                             (1 Point)                 [ ] Arthroscopic surgery                                        (2 Points)  [ ] Central venous access                                      (2 Points)                [ x] General surgery lasting more than 45 minutes   (2 Points)       [ ] Stroke (in the previous month)                          (5 Points)               [ ] Elective arthroplasty                                         (5 Points)                                                                                                                                               HEMATOLOGY RELATED FACTORS                                                 TRAUMA RELATED RISK FACTORS  [ ] Prior episodes of VTE                                     (3 Points)                [ ] Fracture of the hip, pelvis, or leg                       (5 Points)  [ ] Positive family history for VTE                         (3 Points)                 [ ] Acute spinal cord injury (in the previous month)  (5 Points)  [ ] Prothrombin 48401 A                                     (3 Points)                 [ ] Paralysis  (less than 1 month)                             (5 Points)  [ ] Factor V Leiden                                             (3 Points)                  [ ] Multiple Trauma within 1 month                        (5 Points)  [ ] Lupus anticoagulants                                     (3 Points)                                                           [ ] Anticardiolipin antibodies                               (3 Points)                                                       [ ] High homocysteine in the blood                      (3 Points)                                             [ ] Other congenital or acquired thrombophilia      (3 Points)                                                [ ] Heparin induced thrombocytopenia                  (3 Points)                                          Total Score [  5        ]    Caprini Score 0 - 2:  Low Risk, No VTE Prophylaxis required for most patients, encourage ambulation  Caprini Score 3 - 6:  At Risk, pharmacologic VTE prophylaxis is indicated for most patients (in the absence of a contraindication)  Caprini Score Greater than or = 7:  High Risk, pharmacologic VTE prophylaxis is indicated for most patients (in the absence of a contraindication) 66 y/o male scheduled for SVT ablation with Dr. Ariane Valencia on 3/16/2022  - Verbalized understanding of all instructions  - Will continue medications as prescribed unless otherwise instructed by Dr. Thakkar  - Covid swab pending  -Will hold maye and multivitamin and resume after procedure  -Tylenol if needed for pain    OPIOID RISK TOOL    NINA EACH BOX THAT APPLIES AND ADD TOTALS AT THE END    FAMILY HISTORY OF SUBSTANCE ABUSE                 FEMALE         MALE                                                Alcohol                             [  ]1 pt          [  ]3pts                                               Illegal Durgs                     [  ]2 pts        [  ]3pts                                               Rx Drugs                           [  ]4 pts        [  ]4 pts    PERSONAL HISTORY OF SUBSTANCE ABUSE                                                                                          Alcohol                             [  ]3 pts       [  ]3 pts                                               Illegal Drugs                     [  ]4 pts        [  ]4 pts                                               Rx Drugs                           [  ]5 pts        [  ]5 pts    AGE BETWEEN 16-45 YEARS                                      [  ]1 pt         [  ]1 pt    HISTORY OF PREADOLESCENT   SEXUAL ABUSE                                                             [  ]3 pts        [  ]0pts    PSYCHOLOGICAL DISEASE                     ADD, OCD, Bipolar, Schizophrenia        [  ]2 pts         [  ]2 pts                      Depression                                               [  ]1 pt           [  ]1 pt           SCORING TOTAL   (add numbers and type here)              (0)                                     A score of 3 or lower indicated LOW risk for future opioid abuse  A score of 4 to 7 indicated moderate risk for future opioid abuse  A score of 8 or higher indicates a high risk for opioid abuse    CAPRINI SCORE [CLOT]    AGE RELATED RISK FACTORS                                                       MOBILITY RELATED FACTORS  [ ] Age 41-60 years                                            (1 Point)                  [ ] Bed rest                                                        (1 Point)  [ x] Age: 61-74 years                                           (2 Points)                 [ ] Plaster cast                                                   (2 Points)  [ ] Age= 75 years                                              (3 Points)                 [ ] Bed bound for more than 72 hours                 (2 Points)    DISEASE RELATED RISK FACTORS                                               GENDER SPECIFIC FACTORS  [ ] Edema in the lower extremities                       (1 Point)                  [ ] Pregnancy                                                     (1 Point)  [ ] Varicose veins                                               (1 Point)                  [ ] Post-partum < 6 weeks                                   (1 Point)             [ x] BMI > 25 Kg/m2                                            (1 Point)                  [ ] Hormonal therapy  or oral contraception          (1 Point)                 [ ] Sepsis (in the previous month)                        (1 Point)                  [ ] History of pregnancy complications                 (1 point)  [ ] Pneumonia or serious lung disease                                               [ ] Unexplained or recurrent                     (1 Point)           (in the previous month)                               (1 Point)  [ ] Abnormal pulmonary function test                     (1 Point)                 SURGERY RELATED RISK FACTORS  [ ] Acute myocardial infarction                              (1 Point)                 [ ]  Section                                             (1 Point)  [ ] Congestive heart failure (in the previous month)  (1 Point)               [ ] Minor surgery                                                  (1 Point)   [ ] Inflammatory bowel disease                             (1 Point)                 [ ] Arthroscopic surgery                                        (2 Points)  [ ] Central venous access                                      (2 Points)                [ x] General surgery lasting more than 45 minutes   (2 Points)       [ ] Stroke (in the previous month)                          (5 Points)               [ ] Elective arthroplasty                                         (5 Points)                                                                                                                                               HEMATOLOGY RELATED FACTORS                                                 TRAUMA RELATED RISK FACTORS  [ ] Prior episodes of VTE                                     (3 Points)                [ ] Fracture of the hip, pelvis, or leg                       (5 Points)  [ ] Positive family history for VTE                         (3 Points)                 [ ] Acute spinal cord injury (in the previous month)  (5 Points)  [ ] Prothrombin 21740 A                                     (3 Points)                 [ ] Paralysis  (less than 1 month)                             (5 Points)  [ ] Factor V Leiden                                             (3 Points)                  [ ] Multiple Trauma within 1 month                        (5 Points)  [ ] Lupus anticoagulants                                     (3 Points)                                                           [ ] Anticardiolipin antibodies                               (3 Points)                                                       [ ] High homocysteine in the blood                      (3 Points)                                             [ ] Other congenital or acquired thrombophilia      (3 Points)                                                [ ] Heparin induced thrombocytopenia                  (3 Points)                                          Total Score [  5        ]    Caprini Score 0 - 2:  Low Risk, No VTE Prophylaxis required for most patients, encourage ambulation  Caprini Score 3 - 6:  At Risk, pharmacologic VTE prophylaxis is indicated for most patients (in the absence of a contraindication)  Caprini Score Greater than or = 7:  High Risk, pharmacologic VTE prophylaxis is indicated for most patients (in the absence of a contraindication)

## 2022-03-11 NOTE — H&P PST ADULT - NEGATIVE ENMT SYMPTOMS
no hearing difficulty/no ear pain/no tinnitus/no vertigo/no sinus symptoms/no nasal congestion/no nasal discharge/no post-nasal discharge/no nose bleeds/no recurrent cold sores/no gum bleeding/no dry mouth/no throat pain/no dysphagia

## 2022-03-11 NOTE — ASU PATIENT PROFILE, ADULT - PATIENT REPRESENTATIVE NAME
19 y/o F to ED with mother c/o SI x3 months, worsening x1 month.  States has a plan.  A&Ox4.  calm and appropriate.  Easy work of breathing.  Skin warm dry and intact, no rashes.    PMH: bipolar, PTSD, OCD  PSH: appendectomy
Tracey Kumar - Wife

## 2022-03-11 NOTE — H&P PST ADULT - ATTENDING COMMENTS
All risks, benefits and alternatives discussed with patient. He agrees to proceed.    Erik Thakkar MD  Clinical Cardiac Electrophysiology

## 2022-03-11 NOTE — H&P PST ADULT - HISTORY OF PRESENT ILLNESS
66 y/o male with PMH of presents to       Palpitations for about 5 years- 3/2019 holter monitor revealed SVTand has been following over the years. Initially took metoprolol which was d/c due to erectile dysfunction and was placed on verapamil for the past 3 years without any reactipons.. Denies any dizziness, syncope, chest pain, Lighheadedness.       Echocardiogram (date):   Stress Test (date):  Cardiac CT or MRI (date):   Cardiac Cath (date):   Cardiac surgery (date):  68 y/o male with PMH of HTN, HLD, hypothyroidism resolved in 2019, GERD, SVT presents to [facility] today for pending SVT ablation with Dr. Ariane Valencia on 3/16/2022. Reports a hx of palpitations for about 5 years and has been following by EP. Patient underwent IRL implantation on 3/20/2019 which showed SVT and NSVT. Initially took metoprolol which was d/c due to erectile dysfunction and was placed on verapamil for the past 3 years without any reactions Denies any dizziness, syncope, SOB, chest pain, Lightheadedness.        Echocardiogram (date): 6/4/2021-EF 60-65%  Stress Test (date): 6/28/2021- normal LV function with no evidence of ischemia or infarction  Cardiac CT or MRI (date):   Cardiac Cath (date): n/a  Cardiac surgery (date): 3/16/2022 66 y/o male with PMH of HTN, HLD, hypothyroidism resolved in 2019, GERD, SVT presents to [facility] today for pending SVT ablation with Dr. Ariane Valencia on 3/16/2022. Reports a hx of palpitations for about 5 years and has been following by EP. Patient underwent IRL implantation on 3/20/2019 which showed SVT and NSVT. Initially took metoprolol which was d/c due to erectile dysfunction and was placed on verapamil for the past 3 years without any reactions Denies any dizziness, syncope, SOB, chest pain, Lightheadedness.        Echocardiogram (date): 6/4/2021-EF 60-65%  Stress Test (date): 6/28/2021- normal LV function with no evidence of ischemia or infarction  Cardiac CT or MRI (date): n/a  Cardiac Cath (date): n/a  Cardiac surgery (date): 3/16/2022 68 y/o male with PMH of HTN, HLD, hypothyroidism resolved in 2019, GERD, SVT presents to [facility] today for pending SVT ablation with Dr. Ariane Valencia on 3/16/2022. Reports a hx of palpitations for about 5 years and has been following by EP. Patient underwent IRL implantation on 3/20/2019 which showed SVT and NSVT. Initially took metoprolol which was d/c due to erectile dysfunction and was placed on verapamil for the past 3 years without any reactions. Reports that he continues to experience palpitations. Denies any dizziness,  syncope, SOB, chest pain, Lightheadedness.      Echocardiogram (date): 6/4/2021-EF 60-65%  Stress Test (date): 6/28/2021- normal LV function with no evidence of ischemia or infarction  Cardiac CT or MRI (date): n/a  Cardiac Cath (date): n/a  Cardiac surgery (date): 3/16/2022  EKG: 3/11/2022- Normal Sinus rhythm, Possible left atrial enlargement 68 y/o male with PMH of HTN, HLD, hypothyroidism resolved in 2019, GERD, SVT presents to [facility] today for pending SVT ablation with Dr. Ariane Valencia on 3/16/2022. Reports a hx of palpitations for about 5 years and has been following by EP. Patient underwent IRL implantation on 3/20/2019 which showed SVT and NSVT. Initially took metoprolol which was d/c due to erectile dysfunction and was placed on verapamil for the past 3 years without any reactions. Reports that he continues to experience palpitations and feels heart racing and mild SOB during episodes. Remains with loop recorder. Denies any dizziness, syncope, chest pain, Lightheadedness.      Echocardiogram (date): 6/4/2021-EF 60-65%  Stress Test (date): 6/28/2021- normal LV function with no evidence of ischemia or infarction  Cardiac CT or MRI (date): n/a  Cardiac Cath (date): n/a  Cardiac surgery (date): 3/16/2022  EKG: 3/11/2022- Normal Sinus rhythm, Possible left atrial enlargement

## 2022-03-11 NOTE — H&P PST ADULT - NSICDXPASTMEDICALHX_GEN_ALL_CORE_FT
PAST MEDICAL HISTORY:  GERD (gastroesophageal reflux disease)     H/O supraventricular tachycardia     High cholesterol     Hypertension     Hypothyroid resolved 2019- No longer on medications    Palpitations      PAST MEDICAL HISTORY:  At risk for sleep apnea Bang score 4    GERD (gastroesophageal reflux disease)     H/O supraventricular tachycardia     High cholesterol     Hypertension     Hypothyroid resolved 2019- No longer on medications    Palpitations

## 2022-03-16 ENCOUNTER — INPATIENT (INPATIENT)
Facility: HOSPITAL | Age: 67
LOS: 0 days | Discharge: HOME | DRG: 310 | End: 2022-03-16
Attending: INTERNAL MEDICINE | Admitting: INTERNAL MEDICINE
Payer: COMMERCIAL

## 2022-03-16 ENCOUNTER — TRANSCRIPTION ENCOUNTER (OUTPATIENT)
Age: 67
End: 2022-03-16

## 2022-03-16 VITALS
SYSTOLIC BLOOD PRESSURE: 141 MMHG | HEART RATE: 75 BPM | DIASTOLIC BLOOD PRESSURE: 65 MMHG | RESPIRATION RATE: 18 BRPM | OXYGEN SATURATION: 97 %

## 2022-03-16 VITALS
DIASTOLIC BLOOD PRESSURE: 77 MMHG | OXYGEN SATURATION: 100 % | HEART RATE: 91 BPM | SYSTOLIC BLOOD PRESSURE: 143 MMHG | TEMPERATURE: 98 F | RESPIRATION RATE: 16 BRPM

## 2022-03-16 DIAGNOSIS — Z01.818 ENCOUNTER FOR OTHER PREPROCEDURAL EXAMINATION: ICD-10-CM

## 2022-03-16 DIAGNOSIS — R00.2 PALPITATIONS: ICD-10-CM

## 2022-03-16 DIAGNOSIS — Z98.890 OTHER SPECIFIED POSTPROCEDURAL STATES: Chronic | ICD-10-CM

## 2022-03-16 PROCEDURE — 93005 ELECTROCARDIOGRAM TRACING: CPT

## 2022-03-16 PROCEDURE — 93653 COMPRE EP EVAL TX SVT: CPT

## 2022-03-16 PROCEDURE — C1731: CPT

## 2022-03-16 PROCEDURE — C1732: CPT

## 2022-03-16 PROCEDURE — C1894: CPT

## 2022-03-16 PROCEDURE — 36415 COLL VENOUS BLD VENIPUNCTURE: CPT

## 2022-03-16 PROCEDURE — 93623 PRGRMD STIMJ&PACG IV RX NFS: CPT

## 2022-03-16 PROCEDURE — C1760: CPT

## 2022-03-16 PROCEDURE — C1730: CPT

## 2022-03-16 PROCEDURE — C1766: CPT

## 2022-03-16 RX ORDER — HYDROCHLOROTHIAZIDE 25 MG
25 TABLET ORAL DAILY
Refills: 0 | Status: DISCONTINUED | OUTPATIENT
Start: 2022-03-16 | End: 2022-03-16

## 2022-03-16 RX ORDER — VERAPAMIL HCL 240 MG
180 CAPSULE, EXTENDED RELEASE PELLETS 24 HR ORAL AT BEDTIME
Refills: 0 | Status: DISCONTINUED | OUTPATIENT
Start: 2022-03-16 | End: 2022-03-16

## 2022-03-16 RX ORDER — MULTIVIT-MIN/FERROUS GLUCONATE 9 MG/15 ML
1 LIQUID (ML) ORAL DAILY
Refills: 0 | Status: DISCONTINUED | OUTPATIENT
Start: 2022-03-16 | End: 2022-03-16

## 2022-03-16 RX ORDER — ACETAMINOPHEN 500 MG
650 TABLET ORAL EVERY 6 HOURS
Refills: 0 | Status: DISCONTINUED | OUTPATIENT
Start: 2022-03-16 | End: 2022-03-16

## 2022-03-16 RX ORDER — PANTOPRAZOLE SODIUM 20 MG/1
40 TABLET, DELAYED RELEASE ORAL
Refills: 0 | Status: DISCONTINUED | OUTPATIENT
Start: 2022-03-16 | End: 2022-03-16

## 2022-03-16 RX ORDER — ATORVASTATIN CALCIUM 80 MG/1
10 TABLET, FILM COATED ORAL AT BEDTIME
Refills: 0 | Status: DISCONTINUED | OUTPATIENT
Start: 2022-03-16 | End: 2022-03-16

## 2022-03-16 RX ORDER — VITAMIN E 100 UNIT
400 CAPSULE ORAL DAILY
Refills: 0 | Status: DISCONTINUED | OUTPATIENT
Start: 2022-03-16 | End: 2022-03-16

## 2022-03-16 RX ORDER — MULTIVIT-MIN/FERROUS GLUCONATE 9 MG/15 ML
0 LIQUID (ML) ORAL
Qty: 0 | Refills: 0 | DISCHARGE

## 2022-03-16 RX ADMIN — SODIUM CHLORIDE 3 MILLILITER(S): 9 INJECTION INTRAMUSCULAR; INTRAVENOUS; SUBCUTANEOUS at 16:14

## 2022-03-16 NOTE — DISCHARGE NOTE PROVIDER - NSDCFUADDINST_GEN_ALL_CORE_FT
Follow up with Dr. Thakkar in 3-4 weeks. Our office will contact you in 3-5 days to schedule this appointment. Please call your doctor with any questions or concerns.

## 2022-03-16 NOTE — DISCHARGE NOTE PROVIDER - HOSPITAL COURSE
67 year old male with PMH of HTN, hyperlipidemia, GERD, formerly hyperthyroidism treated with methimazole but now euthyroid, and palpitation s/p ILR implant 3/2019. ILR had previously shown salvos of SVT, and intermittent PVCs. He continues to have bothersome palpitation despite medical therapy. Now status post uncomplicated EPS and radiofrequency ablation of supraventricular tachycardia (AVNRT). 67 year old male with PMH of HTN, hyperlipidemia, GERD, formerly hyperthyroidism treated with methimazole but now euthyroid, and palpitation s/p ILR implant 3/2019. ILR had previously shown salvos of SVT, and intermittent PVCs. He continues to have bothersome palpitation despite medical therapy. Now status post uncomplicated EPS and radiofrequency ablation of supraventricular tachycardia (AVNRT). The patient was observed per post procedure protocol, then discharged home with a plan for outpatient follow up.

## 2022-03-16 NOTE — DISCHARGE NOTE PROVIDER - CARE PROVIDER_API CALL
Erik Thakkar)  Cardiac Electrophysiology; Cardiology  [address], [address]  [place] [address]  Phone: [phone]  Fax: [phone]  Follow Up Time:     Alvino Sheehan)  Cardiovascular Disease; Internal Medicine  [address], [address]  [place] [address]  Phone: [phone]  Fax: [phone]  Follow Up Time:

## 2022-03-16 NOTE — DISCHARGE NOTE PROVIDER - NSDCFUSCHEDAPPT_GEN_ALL_CORE_FT
STONEY RODRIGUEZ ; 04/11/2022 ; [facility] [address]  STONEY RODRIGUEZ ; 05/16/2022 ; [facility] [address] [facility]

## 2022-03-16 NOTE — DISCHARGE NOTE PROVIDER - CARE PROVIDERS DIRECT ADDRESSES
,[email or web address]

## 2022-03-16 NOTE — DISCHARGE NOTE NURSING/CASE MANAGEMENT/SOCIAL WORK - PATIENT PORTAL LINK FT
You can access the FollowMyHealth Patient Portal offered by [facility] by registering at the following website: [email or web address]. By joining [employer]’s FollowMyHealth portal, you will also be able to view your health information using other applications (apps) compatible with our system.

## 2022-03-16 NOTE — DISCHARGE NOTE PROVIDER - NSDCCPTREATMENT_GEN_ALL_CORE_FT
PRINCIPAL PROCEDURE  Procedure: Ablation, arrhythmogenic focus, using intracardiac catheter  Findings and Treatment: - Bruising at the groin, sometimes extending down the leg, and/or a small lump under the skin at the groin access site is normal and will resolve within 2 – 3 weeks.   - Occasional skipped beats or palpitations that last for a few beats are common and generally resolve within 1-2 months.   - You may walk and take stairs at a regular pace.   - Do not perform any exercise more strenuous than walking for 1 week.   - Do not strain or lift heavy objects for 1 week.  - You may shower the day after the procedure.  - Do not soak in water (such as tub baths, hot tubs, swimming, etc.) for 1 week.   - You may resume all other activities the day after the procedure.  Call your doctor if:   - you notice bleeding, redness, drainage, swelling, increased tenderness or a hot sensation around the catheter insertion site.   - your temperature is greater than 100 degrees F for more than 24 hours.  - your rapid heart rhythm returns.  - you have any questions or concerns regarding the procedure.  If significant bleeding and/or a large lump (the size of a golf ball or bigger) occurs:  - Lie flat and apply continuous direct pressure just above the puncture site for at least 10 minutes  - If the issue resolves, notify your physician immediately.    - If the bleeding cannot be controlled, please seek immediate medical attention.  If you experience increased difficulty breathing or chest pain, or if you faint or have dizzy spells, please seek immediate medical attention.

## 2022-03-16 NOTE — PROGRESS NOTE ADULT - SUBJECTIVE AND OBJECTIVE BOX
Admission Criteria  Please admit the patient to the following service: CARDIOLOGY    Major Criteria:  - Continuous EKG monitoring is required for condition causing arrhythmia (hyperkalemia, etc)  - Significant volume load > 200 ml    Admit to: 3GUL     Patient is being admitted to the inpatient service due to high risk characteristics and need for further management/monitoring and is considered to be at a significantly increased risk of major adverse cardiac and vascular events if discharged.  
67 year old male with PMH of HTN, hyperlipidemia, GERD, formerly hyperthyroidism treated with methimazole but now euthyroid, and palpitation s/p ILR implant 3/2019. ILR had previously shown salvos of SVT, and intermittent PVCs. He continues to have bothersome palpitation despite medical therapy. He presents today for elective EPS +/- SVT ablation.     Echocardiogram (date): 6/4/2021-EF 60-65%  Stress Test (date): 6/28/2021- normal LV function with no evidence of ischemia or infarction    PST and labs reviewed from 3/11/22; denies interval change.   Confirms NPO > 8 hrs.     - iv heplock  - consent damion/MD   
PROCEDURE(S): Radiofrequency Ablation of Supraventricular Tachycardia     ELECTROPHYSIOLOGIST(S): Erik Thakkar MD         COMPLICATIONS:  none        DISPOSITION: observation      CONDITION: stable    Pt doing well s/p elective radiofrequency supraventricular tachycardia ablation (AVNRT) via b/l FV access. Vascade MVP closure used to achieve hemostasis. Pt denies complaint post procedure.     MEDICATIONS  (STANDING):  atorvastatin 10 milliGRAM(s) Oral at bedtime  cloNIDine 0.2 milliGRAM(s) Oral two times a day  hydrochlorothiazide 25 milliGRAM(s) Oral daily  multivitamin/minerals 1 Tablet(s) Oral daily  pantoprazole    Tablet 40 milliGRAM(s) Oral before breakfast  verapamil  milliGRAM(s) Oral at bedtime  vitamin E 400 International Unit(s) Oral daily    MEDICATIONS  (PRN):  acetaminophen     Tablet .. 650 milliGRAM(s) Oral every 6 hours PRN Mild Pain (1 - 3), Moderate Pain (4 - 6)    Allergies:  lisinopril (Angioedema)    VS:   T(C): 37.1 (03-16-22 @ 15:52), Max: 37.1 (03-16-22 @ 15:52)  HR: 74 (03-16-22 @ 16:07) (74 - 91)  BP: 124/67 (03-16-22 @ 16:07) (124/67 - 143/77)  RR: 18 (03-16-22 @ 16:07) (16 - 18)  SpO2: 96% (03-16-22 @ 16:07) (96% - 100%)  Post-procedure VS: /64 HR 72 O2 sat 97% RR 16     Physical exam:   awake, alert, no obvious distress   Card: S1/S2, RRR, no m/g/r  Resp: lungs CTA b/l  Abd: S/NT/ND  Groins: sites C/D/I; no bleeding, hematoma, erythema, exudate or edema  Ext: no edema; distal pulses intact    ECG: NSR 75 bpm    Assessment:   67 year old male with PMH of HTN, hyperlipidemia, GERD, formerly hyperthyroidism treated with methimazole but now euthyroid, and palpitation s/p ILR implant 3/2019. ILR had previously shown salvos of SVT, and intermittent PVCs. He continues to have bothersome palpitation despite medical therapy. Now status post uncomplicated EPS and radiofrequency ablation of supraventricular tachycardia (AVNRT).     Plan:   Admit to telemetry/ONU  Bedrest x 2 hours, then OOB with assistance and progress as tolerated.   Continue home medications.   Labs and EKG in AM.   Strict I/Os.  Please encourage incentive spirometry and ambulation once able.  Observation and monitoring on telemetry. Consider discharge home today if VS/groin remain stable.

## 2022-03-16 NOTE — CHART NOTE - NSCHARTNOTEFT_GEN_A_CORE
Pt doing well s/p SVT ablation. He offers no complaints, feels well. Pt ambulating on unit at baseline, tolerating PO diet.   Vital signs remain stable.   Bilateral groin sites: Dressings c/d/i; no bleeding, hematoma, or swelling.   Pt stable for d/c home. Discharge instructions reviewed with patient, all questions answered.   Outpatient follow up with Dr. Thakkar in 2-3 weeks.

## 2022-03-16 NOTE — DISCHARGE NOTE PROVIDER - NSDCMRMEDTOKEN_GEN_ALL_CORE_FT
Centrum:   cloNIDine 0.2 mg oral tablet: 1 tab(s) orally 2 times a day  hydroCHLOROthiazide 25 mg oral tablet: 1 tab(s) orally once a day  Lipitor 10 mg oral tablet: 1 tab(s) orally once a day  omeprazole 40 mg oral delayed release capsule: 1 cap(s) orally once a day  Pt underwent a medically necessary procedure on 3/20/19.  He may return to work, full duty, on 3/20/19.  Please call our office with any questions.:   verapamil 200 mg/24 hours oral capsule, extended release: 1 cap(s) orally once a day (at bedtime)  vitamin E 400 intl units oral capsule: 1 cap(s) orally once a day   Centrum: 1 tab(s) orally once a day  cloNIDine 0.2 mg oral tablet: 1 tab(s) orally 2 times a day  hydroCHLOROthiazide 25 mg oral tablet: 1 tab(s) orally once a day  Lipitor 10 mg oral tablet: 1 tab(s) orally once a day  omeprazole 40 mg oral delayed release capsule: 1 cap(s) orally once a day  verapamil 200 mg/24 hours oral capsule, extended release: 1 cap(s) orally once a day (at bedtime)  vitamin E 400 intl units oral capsule: 1 cap(s) orally once a day

## 2022-03-17 ENCOUNTER — NON-APPOINTMENT (OUTPATIENT)
Age: 67
End: 2022-03-17

## 2022-03-18 ENCOUNTER — EMERGENCY (EMERGENCY)
Facility: HOSPITAL | Age: 67
LOS: 1 days | Discharge: HOME | End: 2022-03-18
Attending: STUDENT IN AN ORGANIZED HEALTH CARE EDUCATION/TRAINING PROGRAM
Payer: COMMERCIAL

## 2022-03-18 VITALS
SYSTOLIC BLOOD PRESSURE: 184 MMHG | HEIGHT: 69 IN | WEIGHT: 190.04 LBS | RESPIRATION RATE: 18 BRPM | TEMPERATURE: 99 F | DIASTOLIC BLOOD PRESSURE: 99 MMHG | HEART RATE: 92 BPM | OXYGEN SATURATION: 100 %

## 2022-03-18 DIAGNOSIS — Z98.890 OTHER SPECIFIED POSTPROCEDURAL STATES: Chronic | ICD-10-CM

## 2022-03-18 PROBLEM — Z86.79 PERSONAL HISTORY OF OTHER DISEASES OF THE CIRCULATORY SYSTEM: Chronic | Status: ACTIVE | Noted: 2022-03-11

## 2022-03-18 PROBLEM — Z91.89 OTHER SPECIFIED PERSONAL RISK FACTORS, NOT ELSEWHERE CLASSIFIED: Chronic | Status: ACTIVE | Noted: 2022-03-11

## 2022-03-18 PROBLEM — E03.9 HYPOTHYROIDISM, UNSPECIFIED: Chronic | Status: ACTIVE | Noted: 2022-03-11

## 2022-03-18 LAB
ALBUMIN SERPL ELPH-MCNC: 4 G/DL — SIGNIFICANT CHANGE UP (ref 3.3–5.2)
ALP SERPL-CCNC: 109 U/L — SIGNIFICANT CHANGE UP (ref 40–120)
ALT FLD-CCNC: 24 U/L — SIGNIFICANT CHANGE UP
ANION GAP SERPL CALC-SCNC: 13 MMOL/L — SIGNIFICANT CHANGE UP (ref 5–17)
AST SERPL-CCNC: 23 U/L — SIGNIFICANT CHANGE UP
BASOPHILS # BLD AUTO: 0.04 K/UL — SIGNIFICANT CHANGE UP (ref 0–0.2)
BASOPHILS NFR BLD AUTO: 0.5 % — SIGNIFICANT CHANGE UP (ref 0–2)
BILIRUB SERPL-MCNC: <0.2 MG/DL — LOW (ref 0.4–2)
BUN SERPL-MCNC: 15.4 MG/DL — SIGNIFICANT CHANGE UP (ref 8–20)
CALCIUM SERPL-MCNC: 8.8 MG/DL — SIGNIFICANT CHANGE UP (ref 8.6–10.2)
CHLORIDE SERPL-SCNC: 99 MMOL/L — SIGNIFICANT CHANGE UP (ref 98–107)
CO2 SERPL-SCNC: 26 MMOL/L — SIGNIFICANT CHANGE UP (ref 22–29)
CREAT SERPL-MCNC: 1.28 MG/DL — SIGNIFICANT CHANGE UP (ref 0.5–1.3)
EGFR: 62 ML/MIN/1.73M2 — SIGNIFICANT CHANGE UP
EOSINOPHIL # BLD AUTO: 0.09 K/UL — SIGNIFICANT CHANGE UP (ref 0–0.5)
EOSINOPHIL NFR BLD AUTO: 1.2 % — SIGNIFICANT CHANGE UP (ref 0–6)
GLUCOSE SERPL-MCNC: 144 MG/DL — HIGH (ref 70–99)
HCT VFR BLD CALC: 45.1 % — SIGNIFICANT CHANGE UP (ref 39–50)
HGB BLD-MCNC: 14.4 G/DL — SIGNIFICANT CHANGE UP (ref 13–17)
IMM GRANULOCYTES NFR BLD AUTO: 0.3 % — SIGNIFICANT CHANGE UP (ref 0–1.5)
LIDOCAIN IGE QN: 40 U/L — SIGNIFICANT CHANGE UP (ref 22–51)
LYMPHOCYTES # BLD AUTO: 2.4 K/UL — SIGNIFICANT CHANGE UP (ref 1–3.3)
LYMPHOCYTES # BLD AUTO: 32 % — SIGNIFICANT CHANGE UP (ref 13–44)
MAGNESIUM SERPL-MCNC: 1.7 MG/DL — LOW (ref 1.8–2.6)
MCHC RBC-ENTMCNC: 28.2 PG — SIGNIFICANT CHANGE UP (ref 27–34)
MCHC RBC-ENTMCNC: 31.9 GM/DL — LOW (ref 32–36)
MCV RBC AUTO: 88.3 FL — SIGNIFICANT CHANGE UP (ref 80–100)
MONOCYTES # BLD AUTO: 0.62 K/UL — SIGNIFICANT CHANGE UP (ref 0–0.9)
MONOCYTES NFR BLD AUTO: 8.3 % — SIGNIFICANT CHANGE UP (ref 2–14)
NEUTROPHILS # BLD AUTO: 4.32 K/UL — SIGNIFICANT CHANGE UP (ref 1.8–7.4)
NEUTROPHILS NFR BLD AUTO: 57.7 % — SIGNIFICANT CHANGE UP (ref 43–77)
PLATELET # BLD AUTO: 168 K/UL — SIGNIFICANT CHANGE UP (ref 150–400)
POTASSIUM SERPL-MCNC: 4.2 MMOL/L — SIGNIFICANT CHANGE UP (ref 3.5–5.3)
POTASSIUM SERPL-SCNC: 4.2 MMOL/L — SIGNIFICANT CHANGE UP (ref 3.5–5.3)
PROT SERPL-MCNC: 6.9 G/DL — SIGNIFICANT CHANGE UP (ref 6.6–8.7)
RBC # BLD: 5.11 M/UL — SIGNIFICANT CHANGE UP (ref 4.2–5.8)
RBC # FLD: 13.2 % — SIGNIFICANT CHANGE UP (ref 10.3–14.5)
SODIUM SERPL-SCNC: 138 MMOL/L — SIGNIFICANT CHANGE UP (ref 135–145)
TROPONIN T SERPL-MCNC: 0.03 NG/ML — SIGNIFICANT CHANGE UP (ref 0–0.06)
WBC # BLD: 7.49 K/UL — SIGNIFICANT CHANGE UP (ref 3.8–10.5)
WBC # FLD AUTO: 7.49 K/UL — SIGNIFICANT CHANGE UP (ref 3.8–10.5)

## 2022-03-18 PROCEDURE — 71046 X-RAY EXAM CHEST 2 VIEWS: CPT | Mod: 26

## 2022-03-18 PROCEDURE — 99285 EMERGENCY DEPT VISIT HI MDM: CPT

## 2022-03-18 PROCEDURE — 93010 ELECTROCARDIOGRAM REPORT: CPT | Mod: 76

## 2022-03-18 RX ORDER — MAGNESIUM SULFATE 500 MG/ML
2 VIAL (ML) INJECTION ONCE
Refills: 0 | Status: COMPLETED | OUTPATIENT
Start: 2022-03-18 | End: 2022-03-18

## 2022-03-18 RX ORDER — FAMOTIDINE 10 MG/ML
20 INJECTION INTRAVENOUS ONCE
Refills: 0 | Status: COMPLETED | OUTPATIENT
Start: 2022-03-18 | End: 2022-03-18

## 2022-03-18 RX ADMIN — Medication 25 GRAM(S): at 23:05

## 2022-03-18 NOTE — ED PROVIDER NOTE - PHYSICAL EXAMINATION
Const: Awake, alert and oriented. In no acute distress. Well appearing.  HEENT: NC/AT. Moist mucous membranes.  Eyes: No scleral icterus. EOMI.  Neck:. Soft and supple. Full ROM without pain.  Cardiac: Regular rate and regular rhythm. +S1/S2. Peripheral pulses 2+ and symmetric. No LE edema.  Resp: Speaking in full sentences. No evidence of respiratory distress. No wheezes, rales or rhonchi.  Abd: Soft, non-tender, non-distended. Normal bowel sounds in all 4 quadrants. No guarding or rebound.  Back: Spine midline and non-tender. No CVAT.  Skin: No rashes, abrasions or lacerations. Groin access sites with no edema, tenderness, erythema, CDI  Lymph: No cervical lymphadenopathy.  Neuro: Awake, alert & oriented x 3. Moves all extremities symmetrically.

## 2022-03-18 NOTE — ED PROVIDER NOTE - NS ED ATTENDING STATEMENT MOD
[facility]                                 Skilled Nursing Facility                   Progress Note     Admit Date: 3/6/20  FLORIAN   Principal Problem:  Debility R/T recent acute on chronic respiratory failure  HPI obtained from patient interview and chart review     Visit Date: 3/11/2020    Chief Complaint: Establish Care/ Initial Visit s/p hospitalization for acute on chronic respiratory failure    HPI:   Ms. Sotelo (Flores) is a 75 year old female with PMH of COPD, CAD, PAD, Tobacco abuse. Presented to ED with cough, wheezing worsened over several days. Patient admitted for acute on chronic hypoxemic/hypercapnic respiratory failure. CXR with nonspecific interstitial markings concerning for possible atypical PNA vs pulmonary edema.  BNP of 482. She was treated with IV Lasix, Bipap, Azithromycin, Rocephin, steroids, O2 therapy with improvement. Recommended to f/u with pulmonary on dc from [facility].     Patient will be treated at [facility] with PT and OT to improve functional status and ability to perform ADLs.     Past Medical History: Patient has a past medical history of Anemia, Anxiety, COPD (chronic obstructive pulmonary disease), COPD (chronic obstructive pulmonary disease) with emphysema, Coronary artery disease, Depression, Diverticulitis, Hyperlipidemia, Hypertension, PVD (peripheral vascular disease), PVD (peripheral vascular disease), S/P colostomy, Substance abuse, and Tobacco abuse.    Past Surgical History: Patient has a past surgical history that includes Colostomy; right toe amputation; Ectopic pregnancy surgery; and right forefoot amputation.    Social History: Patient reports that she quit smoking about 6 years ago. Her smoking use included cigarettes. She has a 25.00 pack-year smoking history. She has never used smokeless tobacco. She reports that she does not drink alcohol or use drugs.    Family History:  family history is not on file.    Allergies: Patient has No Known Allergies.    ROS  Constitutional: Negative for fever or fatigue.   Eyes: Negative for blurred vision, double vision and discharge.   Respiratory: + for cough, shortness of breath and wheezing.    Cardiovascular: Negative for chest pain, palpitations, and leg swelling.   Gastrointestinal: Negative for abdominal pain, constipation, diarrhea, nausea and vomiting.   Genitourinary: Negative for dysuria, frequency and urgency.   Musculoskeletal:  + generalized weakness. Negative for back pain and myalgias.   Skin: Negative for itching and rash.   Neurological: Negative for dizziness, speech change, and headaches.   Psychiatric/Behavioral: Negative for depression. The patient is not nervous/anxious.      PEx     Constitutional: Patient appears well-developed and in no distress   Head: Normocephalic and atraumatic.   Eyes: Pupils are equal, round, and reactive to light.   Neck: Normal range of motion. Neck supple.   Cardiovascular: Normal rate, regular rhythm and normal heart sounds.    Pulmonary/Chest: Effort normal and breath sounds are diminished throughout +wet cough noted during visit  Abdominal: Soft. Bowel sounds are normal.   Musculoskeletal: Normal range of motion.   Neurological: Alert and oriented to person, place, and time.   Psychiatric: Normal mood and affect. Behavior is normal.   Skin: Skin is warm and dry. Full skin assessment completed during visit, no concerns noted      Assessment and Plan:    Aftercare of recent Acute on chronic respiratory failure with hypoxia and hypercapnia, improving  Recent Hx of COPD exacerbation, improving   - Secondary to COPD exacerbation secondary possible atypical pneumonia> Completed Azithro, continue ceftriaxone started 3/1>steroids  - CT Chest negative for PE however noting tree-n-bud appearance at the bases which could be concerning for GRACIELA in the right setting.  - If chest xray does not clear prior to  discharge, she should have repeat imaging and follow up with pulmonary 4-6 weeks after discharge.    - Continue bipap qhs, nasal cannula O2  - Continue Symbicort BID, Spiriva, Mucinex b.i.d., Acetylcysteine BID, Albuterol neb [id number] 3/13 then change to PRN    Debility r/t recent acute on chronic respiratory failure with COPD exacerbation, ongoing  - Continue with PT/OT for gait training and strengthening and restoration of ADL's   - Encourage mobility, OOB in chair, and early ambulation as appropriate  - Fall precautions   - Monitor for bowel and bladder dysfunction  - Monitor for and prevent skin breakdown and pressure ulcers  - Continue MiraLax q.d. for BM regimen  - continue ProStat b.i.d.-and 4/10, house supplement t.i.d., regular diet with no added salt  - Continue Lidoderm patch QD to , Tylenol p.r.n. pain     Essential HTN, chronic, stable  CAD (coronary artery disease)  Remote Hx of PAD, chronic  - Elevated  on admit>TTE: EF 60, normal LV function  - Continue daily asa, Plavix, Lopressor, Lipitor, Pletal b.i.d.     Nicotine vapor product user, chronic  - Counseled on the risks associated with vaping and encouraged cessation   -  continue with nicotine patch, will taper dose as needed on skilled      Vit D deficiency, chronic, stable  - continue ergocalciferol weekly-end date 4/3    MDD with Anxiety, recurrent episodes, chronic, stable  - continue home citalopram 20 mg daily     Remote Hx of colostomy placement    Remote Hx of right forefront amputation  Remote Hx of right toe amputation          No future appointments.      I certify that SNF services are required to be given on an inpatient basis because Radha Flores needs for skilled nursing care and/or skilled rehabilitation are required on a daily basis and such services can only practically be provided in a skilled nursing facility setting and are for an ongoing condition for which she received inpatient care in the hospital.     Total  time of the visit 112 minutes  Non physical exam/ non charting time: 80 minutes   Start Time:0800  Stop Time:0952  Description of non physical exam/non charting time: counseling patient on clinical conditions and therapies provided regarding .  Extensive chart review completed including all consultation notes.  All pertinent laboratory and radiographical images reviewed.        Ayla Leonardo NP          Patient note was created using MModal Dictation.  Any errors in syntax or even information may not have been identified and edited on initial review prior to signing this note.     Attending Only

## 2022-03-18 NOTE — ED PROVIDER NOTE - CLINICAL SUMMARY MEDICAL DECISION MAKING FREE TEXT BOX
68 y/o male with PMHx of GERD, H/O supraventricular tachycardia, HLD, HTN, Hypothyroidism no longer on medications and Palpitations presents to ED c/o hypertension. 66 y/o male with PMHx of GERD, H/O supraventricular tachycardia, HLD, HTN, Hypothyroidism no longer on medications and Palpitations presents to ED c/o hypertension. Pt with labs at baseline, no acute ishcemic changes on ekg x2, trop neg x2, pt symptoms resolved after receiving pepcid, dc with follow up

## 2022-03-18 NOTE — ED ADULT NURSE NOTE - OBJECTIVE STATEMENT
Pt. c/o HTN s/p ablation for SVT 2 days ago.  Pt. states he was feeling fine since DC, but today felt discomfort in chest and neck.  States he took his BP bilateral, 210 systolic in left, 214 in right arm.  Decided to come to ED for evaluation.  Son bedside

## 2022-03-18 NOTE — ED PROVIDER NOTE - NSFOLLOWUPINSTRUCTIONS_ED_ALL_ED_FT
Hypertension    Hypertension, commonly called high blood pressure, is when the force of blood pumping through your arteries is too strong. Hypertension forces your heart to work harder to pump blood. Your arteries may become narrow or stiff. Having untreated or uncontrolled hypertension for a long period of time can cause heart attack, stroke, kidney disease, and other problems. If started on a medication, take exactly as prescribed by your health care professional. Maintain a healthy lifestyle and follow up with your primary care physician.    SEEK IMMEDIATE MEDICAL CARE IF YOU HAVE ANY OF THE FOLLOWING SYMPTOMS: severe headache, confusion, chest pain, abdominal pain, vomiting, or shortness of breath.    -Please follow-up with your primary care doctor in the next 2 days.  Please call tomorrow for an appointment.  If you cannot follow-up with your primary care doctor please return to the ED for any urgent issues.  - You were given a copy of the tests performed today.  Please bring the results with you and review them with your primary care doctor.  - If you have any worsening of symptoms or any other concerns please return to the ED immediately.  - Please continue taking your home medications as directed.

## 2022-03-18 NOTE — ED PROVIDER NOTE - OBJECTIVE STATEMENT
66 y/o male with PMHx of GERD, H/O supraventricular tachycardia, HLD, HTN, Hypothyroidism no longer on medications and Palpitations presents to ED c/o hypertension. Patient is 2 days s/p SVT ablation and reports yesterday he noted a small amount of blood on one of the dressings, but was told by his cardiologist that was normal. Patient reports then tonight while laying down after eating and taking his medications, he started feeling "not normal". At that time, he checked his blood pressure, both arms were in the 200's systolic.      Pt denies fevers/chills, ha, loc, focal neuro deficits, cp/sob/palp, cough, abd pain/n/v/d, urinary symptoms, recent travel and sick contacts, pain or swelling at groin surgical site  Cardiologist: Dr. Thakkar

## 2022-03-18 NOTE — ED ADULT NURSE NOTE - SUICIDE SCREENING DEPRESSION
Negative no palpitations/no chest pain/no dyspnea on exertion/no peripheral edema/no paroxysmal nocturnal dyspnea

## 2022-03-18 NOTE — ED PROVIDER NOTE - NSICDXPASTMEDICALHX_GEN_ALL_CORE_FT
PAST MEDICAL HISTORY:  At risk for sleep apnea Bang score 4    GERD (gastroesophageal reflux disease)     H/O supraventricular tachycardia     High cholesterol     Hypertension     Hypothyroid resolved 2019- No longer on medications    Palpitations

## 2022-03-18 NOTE — ED ADULT TRIAGE NOTE - CHIEF COMPLAINT QUOTE
Pt A&Ox4 states "I had an ablation and tonight I felt funny and my pressures is high."  BIBA c/o hypertension. Patient has HTN usually controlled with medications.

## 2022-03-18 NOTE — ED PROVIDER NOTE - PATIENT PORTAL LINK FT
You can access the FollowMyHealth Patient Portal offered by [facility] by registering at the following website: [email or web address]. By joining [employer]s FollowMyHealth portal, you will also be able to view your health information using other applications (apps) compatible with our system.

## 2022-03-19 VITALS
SYSTOLIC BLOOD PRESSURE: 141 MMHG | HEART RATE: 85 BPM | OXYGEN SATURATION: 100 % | RESPIRATION RATE: 17 BRPM | DIASTOLIC BLOOD PRESSURE: 67 MMHG

## 2022-03-19 LAB — TROPONIN T SERPL-MCNC: 0.02 NG/ML — SIGNIFICANT CHANGE UP (ref 0–0.06)

## 2022-03-19 PROCEDURE — 83690 ASSAY OF LIPASE: CPT

## 2022-03-19 PROCEDURE — 83735 ASSAY OF MAGNESIUM: CPT

## 2022-03-19 PROCEDURE — 99285 EMERGENCY DEPT VISIT HI MDM: CPT | Mod: 25

## 2022-03-19 PROCEDURE — 71046 X-RAY EXAM CHEST 2 VIEWS: CPT

## 2022-03-19 PROCEDURE — 96375 TX/PRO/DX INJ NEW DRUG ADDON: CPT

## 2022-03-19 PROCEDURE — 96374 THER/PROPH/DIAG INJ IV PUSH: CPT

## 2022-03-19 PROCEDURE — 36415 COLL VENOUS BLD VENIPUNCTURE: CPT

## 2022-03-19 PROCEDURE — 85025 COMPLETE CBC W/AUTO DIFF WBC: CPT

## 2022-03-19 PROCEDURE — 80053 COMPREHEN METABOLIC PANEL: CPT

## 2022-03-19 PROCEDURE — 84484 ASSAY OF TROPONIN QUANT: CPT

## 2022-03-19 PROCEDURE — 93005 ELECTROCARDIOGRAM TRACING: CPT

## 2022-03-19 RX ADMIN — FAMOTIDINE 20 MILLIGRAM(S): 10 INJECTION INTRAVENOUS at 00:10

## 2022-03-22 DIAGNOSIS — I10 ESSENTIAL (PRIMARY) HYPERTENSION: ICD-10-CM

## 2022-03-22 RX ORDER — HYDROCHLOROTHIAZIDE 25 MG/1
25 TABLET ORAL
Qty: 90 | Refills: 2 | Status: DISCONTINUED | COMMUNITY
End: 2022-03-22

## 2022-03-22 RX ORDER — CHLORTHALIDONE 25 MG/1
25 TABLET ORAL DAILY
Qty: 90 | Refills: 0 | Status: ACTIVE | COMMUNITY
Start: 2022-03-22 | End: 1900-01-01

## 2022-03-24 ENCOUNTER — NON-APPOINTMENT (OUTPATIENT)
Age: 67
End: 2022-03-24

## 2022-04-11 ENCOUNTER — NON-APPOINTMENT (OUTPATIENT)
Age: 67
End: 2022-04-11

## 2022-04-11 ENCOUNTER — APPOINTMENT (OUTPATIENT)
Dept: ELECTROPHYSIOLOGY | Facility: CLINIC | Age: 67
End: 2022-04-11
Payer: MEDICARE

## 2022-04-11 PROCEDURE — G2066: CPT

## 2022-04-11 PROCEDURE — 93298 REM INTERROG DEV EVAL SCRMS: CPT

## 2022-04-12 ENCOUNTER — APPOINTMENT (OUTPATIENT)
Dept: ELECTROPHYSIOLOGY | Facility: CLINIC | Age: 67
End: 2022-04-12
Payer: MEDICARE

## 2022-04-12 ENCOUNTER — NON-APPOINTMENT (OUTPATIENT)
Age: 67
End: 2022-04-12

## 2022-04-12 VITALS
SYSTOLIC BLOOD PRESSURE: 136 MMHG | OXYGEN SATURATION: 97 % | DIASTOLIC BLOOD PRESSURE: 60 MMHG | TEMPERATURE: 98.5 F | HEART RATE: 68 BPM | BODY MASS INDEX: 28.14 KG/M2 | HEIGHT: 69 IN | WEIGHT: 190 LBS

## 2022-04-12 DIAGNOSIS — Z95.818 PRESENCE OF OTHER CARDIAC IMPLANTS AND GRAFTS: ICD-10-CM

## 2022-04-12 PROCEDURE — 93000 ELECTROCARDIOGRAM COMPLETE: CPT

## 2022-04-12 PROCEDURE — 99214 OFFICE O/P EST MOD 30 MIN: CPT

## 2022-04-12 RX ORDER — POTASSIUM CHLORIDE 750 MG/1
10 CAPSULE, EXTENDED RELEASE ORAL TWICE DAILY
Refills: 0 | Status: ACTIVE | COMMUNITY

## 2022-04-12 RX ORDER — MULTIVIT-MIN/IRON/FOLIC ACID/K 18-600-40
400 CAPSULE ORAL
Refills: 0 | Status: ACTIVE | COMMUNITY

## 2022-04-12 NOTE — DISCUSSION/SUMMARY
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 He has had frequent palpitations which have correlated to both SVT as well as APCs.  He is now s/p ablation (Inducible for non-sustained AVNRT , successful slow pathway modification with radiofrequency ablation) on 3/16/22.\par \par Today, he reports he has been feeling well since procedure. Significant symptomatic improvement with less palpitations. Still feels palpitations on occasion which are brief and not highly bothersome. Denies edema, drainage from b/l femoral access site. Mild pinching pain to left access site that is positional and improving over time. ILR reveals no events since procedure. Had difficult to control BP after hospitalization, BP now controlled since HCTZ switched to chlorthalidone. \par \par Recommendation: \par \par Mr. Kumar is doing well s/p slow pathway modification 3/16/22. ILR has revealed no events since procedure. C/o infrequent palpitations, encouraged symptom activator use. To continue verapamil 240mg daily as also being utilized for hypertension management. Remote ILR monitoring in place, EP follow up as needed. \par \par Johnna MONTAÑO-C

## 2022-04-12 NOTE — HISTORY OF PRESENT ILLNESS
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 He has had frequent palpitations which have correlated to both SVT as well as APCs.  He is now s/p ablation (Inducible for non-sustained AVNRT , successful slow pathway modification with radiofrequency ablation) on 3/16/22.\par \par Today, he reports he has been feeling well since procedure. Significant symptomatic improvement with less palpitations. Still feels palpitations on occasion which are brief and not highly bothersome. Denies edema, drainage from b/l femoral access site. Mild pinching pain to left access site that is positional and improving over time. ILR reveals no events since procedure. Had difficult to control BP after hospitalization, BP now controlled since HCTZ switched to chlorthalidone.

## 2022-04-12 NOTE — END OF VISIT
[Time Spent: ___ minutes] : I have spent [unfilled] minutes of time on the encounter. [FreeTextEntry3] : I have personally seen, examined, and participated in the care of this patient. I have reviewed all pertinent clinical information, including history, physical exam, plan, and the PA/NP's note and agree except as noted below.\par \par Doing well after SVT ablation with improvement in palpitations. Recommend he remain on Verapamil to help with BP. BP also better controlled on Chlorthalidone. He will continue with remote monitoring and will continue long term follow up with Dr. Sheehan. He can see EP as needed.\par \par Erik Thakkar MD, FACC, RS\par Clinical Cardiac Electrophysiology

## 2022-04-12 NOTE — PHYSICAL EXAM
[General Appearance - Well Developed] : well developed [General Appearance - Well Nourished] : well nourished [Heart Rate And Rhythm] : heart rate and rhythm were normal [Heart Sounds] : normal S1 and S2 [Murmurs] : no murmurs present [] : no respiratory distress [Clean] : clean [Dry] : dry [Healing Well] : healing well [Erythema] : not erythematous [Warm] : not warm [Tender] : not tender [FreeTextEntry1] : B/l femoral access site

## 2022-05-16 ENCOUNTER — APPOINTMENT (OUTPATIENT)
Dept: ELECTROPHYSIOLOGY | Facility: CLINIC | Age: 67
End: 2022-05-16
Payer: MEDICARE

## 2022-05-16 ENCOUNTER — NON-APPOINTMENT (OUTPATIENT)
Age: 67
End: 2022-05-16

## 2022-05-16 PROCEDURE — 93298 REM INTERROG DEV EVAL SCRMS: CPT

## 2022-05-16 PROCEDURE — G2066: CPT

## 2022-06-10 ENCOUNTER — RX RENEWAL (OUTPATIENT)
Age: 67
End: 2022-06-10

## 2022-06-20 ENCOUNTER — NON-APPOINTMENT (OUTPATIENT)
Age: 67
End: 2022-06-20

## 2022-06-20 ENCOUNTER — APPOINTMENT (OUTPATIENT)
Dept: ELECTROPHYSIOLOGY | Facility: CLINIC | Age: 67
End: 2022-06-20
Payer: MEDICARE

## 2022-06-20 PROCEDURE — G2066: CPT

## 2022-06-20 PROCEDURE — 93298 REM INTERROG DEV EVAL SCRMS: CPT

## 2022-07-25 ENCOUNTER — NON-APPOINTMENT (OUTPATIENT)
Age: 67
End: 2022-07-25

## 2022-07-25 ENCOUNTER — APPOINTMENT (OUTPATIENT)
Dept: ELECTROPHYSIOLOGY | Facility: CLINIC | Age: 67
End: 2022-07-25

## 2022-07-25 PROCEDURE — G2066: CPT

## 2022-07-25 PROCEDURE — 93298 REM INTERROG DEV EVAL SCRMS: CPT

## 2022-08-12 ENCOUNTER — NON-APPOINTMENT (OUTPATIENT)
Age: 67
End: 2022-08-12

## 2022-08-29 ENCOUNTER — APPOINTMENT (OUTPATIENT)
Dept: ELECTROPHYSIOLOGY | Facility: CLINIC | Age: 67
End: 2022-08-29

## 2022-09-07 ENCOUNTER — TRANSCRIPTION ENCOUNTER (OUTPATIENT)
Age: 67
End: 2022-09-07

## 2022-09-07 ENCOUNTER — OUTPATIENT (OUTPATIENT)
Dept: OUTPATIENT SERVICES | Facility: HOSPITAL | Age: 67
LOS: 1 days | End: 2022-09-07
Payer: COMMERCIAL

## 2022-09-07 VITALS
OXYGEN SATURATION: 99 % | TEMPERATURE: 98 F | HEART RATE: 82 BPM | WEIGHT: 184.09 LBS | DIASTOLIC BLOOD PRESSURE: 71 MMHG | HEIGHT: 69 IN | SYSTOLIC BLOOD PRESSURE: 144 MMHG | RESPIRATION RATE: 18 BRPM

## 2022-09-07 VITALS
DIASTOLIC BLOOD PRESSURE: 74 MMHG | RESPIRATION RATE: 18 BRPM | SYSTOLIC BLOOD PRESSURE: 160 MMHG | TEMPERATURE: 98 F | OXYGEN SATURATION: 100 % | HEART RATE: 65 BPM

## 2022-09-07 DIAGNOSIS — I47.1 SUPRAVENTRICULAR TACHYCARDIA: ICD-10-CM

## 2022-09-07 DIAGNOSIS — Z98.890 OTHER SPECIFIED POSTPROCEDURAL STATES: Chronic | ICD-10-CM

## 2022-09-07 PROCEDURE — 33286 RMVL SUBQ CAR RHYTHM MNTR: CPT

## 2022-09-07 RX ORDER — CEPHALEXIN 500 MG
500 CAPSULE ORAL ONCE
Refills: 0 | Status: COMPLETED | OUTPATIENT
Start: 2022-09-07 | End: 2022-09-07

## 2022-09-07 RX ORDER — OMEPRAZOLE 10 MG/1
1 CAPSULE, DELAYED RELEASE ORAL
Qty: 0 | Refills: 0 | DISCHARGE

## 2022-09-07 RX ORDER — MULTIVIT-MIN/FERROUS GLUCONATE 9 MG/15 ML
1 LIQUID (ML) ORAL
Qty: 0 | Refills: 0 | DISCHARGE

## 2022-09-07 RX ORDER — VITAMIN E 100 UNIT
1 CAPSULE ORAL
Qty: 0 | Refills: 0 | DISCHARGE

## 2022-09-07 RX ORDER — ATORVASTATIN CALCIUM 80 MG/1
1 TABLET, FILM COATED ORAL
Qty: 0 | Refills: 0 | DISCHARGE

## 2022-09-07 RX ORDER — POTASSIUM CHLORIDE 20 MEQ
1 PACKET (EA) ORAL
Qty: 0 | Refills: 0 | DISCHARGE

## 2022-09-07 RX ORDER — SOLIFENACIN SUCCINATE 10 MG/1
1 TABLET ORAL
Qty: 0 | Refills: 0 | DISCHARGE

## 2022-09-07 RX ORDER — VERAPAMIL HCL 240 MG
1 CAPSULE, EXTENDED RELEASE PELLETS 24 HR ORAL
Qty: 0 | Refills: 0 | DISCHARGE

## 2022-09-07 RX ORDER — MAGNESIUM OXIDE 400 MG ORAL TABLET 241.3 MG
1 TABLET ORAL
Qty: 0 | Refills: 0 | DISCHARGE

## 2022-09-07 RX ADMIN — Medication 500 MILLIGRAM(S): at 08:10

## 2022-09-07 NOTE — H&P PST ADULT - HISTORY OF PRESENT ILLNESS
67 year old male with PMHx of HTN, hyperlipidemia, GERD, formerly hyperthyroidism treated with methimazole now euthyroid, palpitations s/p ILR implant 3/2019, and long RP SVT ( s/p AVNRT, slow pathway modification RFA 3/16/22 Dr. Thakkar), presents today for elective loop recorder explant with Dr. Thakkar. Pt reports a significant improvement in palpitations since his ablation and previous ILR in office interrogation (4/12/22) revealed no events since procedure. Pts device has now reached EOL and he is presenting electively for device removal.  67 year old male with PMHx of HTN, hyperlipidemia, GERD, formerly hyperthyroidism treated with methimazole now euthyroid, palpitations s/p ILR implant 3/2019, and long RP SVT ( s/p AVNRT, slow pathway modification RFA 3/16/22 Dr. Thakkar), presents today for elective loop recorder explant with Dr. Thakkar. Pt reports a significant improvement in palpitations since his ablation and previous ILR in office interrogation (4/12/22) revealed no events since procedure. Pts device has now reached EOL and he is presenting electively for device removal. Pt currently denies chest pain, SOB, palpitations, dizziness, fever, chills, recent syncopal episodes. COVID-19 PCR negative 9/3.    Cardiology Summary:     EKG: 3/18/22: Normal sinus rhythm    NST 6/28/21:    The left ventricle was normal in size.   Tracer uptake was homogeneous throughout the left  ventricle.   Normal study; no evidence for myocardial infarction or  ischemia.   Gated wall motion analysis was performed, and shows  normal wall motion.

## 2022-09-07 NOTE — PROGRESS NOTE ADULT - SUBJECTIVE AND OBJECTIVE BOX
Procedure: Loop recorder explant  Electrophysiologist: Nneka Mera MD    Pt doing well s/p loop recorder explan. Denies any complaints post procedure.     Incision: incision C/D/I; no bleeding, hematoma, erythema, exudate or edema. Dermabond applied to incision site    Plan:   Resume PO intake.   Ambulate w/ assist, then progress as tolerated.     Pain control with PO analgesia PRN.   Resume home medications.   D/C home once all criteria met, with outpt f/up in 1-2 weeks.

## 2022-09-07 NOTE — DISCHARGE NOTE NURSING/CASE MANAGEMENT/SOCIAL WORK - PATIENT PORTAL LINK FT
You can access the FollowMyHealth Patient Portal offered by [facility] by registering at the following website: [email or web address]. By joining [facility]’s FollowMyHealth portal, you will also be able to view your health information using other applications (apps) compatible with our system.

## 2022-09-07 NOTE — DISCHARGE NOTE PROVIDER - NSDCMRMEDTOKEN_GEN_ALL_CORE_FT
Centrum: 1 tab(s) orally once a day  cloNIDine 0.2 mg oral tablet: 1 tab(s) orally 2 times a day  hydroCHLOROthiazide 25 mg oral tablet: 1 tab(s) orally once a day  Lipitor 10 mg oral tablet: 1 tab(s) orally once a day  omeprazole 40 mg oral delayed release capsule: 1 cap(s) orally once a day  verapamil 200 mg/24 hours oral capsule, extended release: 1 cap(s) orally once a day (at bedtime)  vitamin E 400 intl units oral capsule: 1 cap(s) orally once a day   Centrum: 1 tab(s) orally once a day  cloNIDine 0.2 mg oral tablet: 1 tab(s) orally 2 times a day  Klor-Con 10 oral tablet, extended release: 1 tab(s) orally 2 times a day  Lipitor 10 mg oral tablet: 1 tab(s) orally once a day  magnesium oxide 400 mg oral tablet: 1 tab(s) orally once a day  sildenafil 100 mg oral tablet: 1 tab(s) orally once a day, As Needed  solifenacin 5 mg oral tablet: 1 tab(s) orally once a day (at bedtime)  verapamil 240 mg/24 hours oral capsule, extended release: 1 cap(s) orally once a day  vitamin E 400 intl units oral capsule: 1 cap(s) orally once a day

## 2022-09-07 NOTE — DISCHARGE NOTE PROVIDER - CARE PROVIDER_API CALL
Erik Thakkar)  Cardiac Electrophysiology; Cardiology  [address]  [place] [address]  Phone: [phone]  Fax: [phone]  Follow Up Time:     Alvino Sheehan)  Cardiovascular Disease; Internal Medicine  [address], [address]  [place] [address]  Phone: [phone]  Fax: [phone]  Follow Up Time:

## 2022-09-07 NOTE — H&P PST ADULT - ASSESSMENT
67 year old male with PMHx of HTN, hyperlipidemia, GERD, formerly hyperthyroidism treated with methimazole now euthyroid, palpitations s/p ILR implant 3/2019, and long RP SVT ( s/p AVNRT, slow pathway modification RFA 3/16/22 Dr. Thakkar), presents today for elective loop recorder explant with Dr. Thakkar. Pt reports a significant improvement in palpitations since his ablation and previous ILR in office interrogation (4/12/22) revealed no events since procedure. Pts device has now reached EOL and he is presenting electively for device removal. Pt currently denies chest pain, SOB, palpitations, dizziness, fever, chills, recent syncopal episodes. COVID-19 PCR negative 9/3.    Plan  1) Loop recorder explant  - Consent with attending   - Site prep per protocol

## 2022-09-07 NOTE — DISCHARGE NOTE NURSING/CASE MANAGEMENT/SOCIAL WORK - NSDCPEFALRISK_GEN_ALL_CORE
For information on Fall & Injury Prevention, visit: [email or web address] OR  [email or web address] OR  https://www.cdc.gov/steadi/patient.html

## 2022-09-07 NOTE — DISCHARGE NOTE PROVIDER - HOSPITAL COURSE
67 year old male with PMHx of HTN, hyperlipidemia, GERD, formerly hyperthyroidism treated with methimazole now euthyroid, palpitations s/p ILR implant 3/2019, and long RP SVT ( s/p AVNRT, slow pathway modification RFA 3/16/22 Dr. Thakkar), presented today 9/7/22 for and is now s/p elective loop recorder explant with Dr. Thakkar. The patient was observed per post procedure protocol, then discharged home with a plan for outpatient follow up.

## 2022-09-07 NOTE — DISCHARGE NOTE PROVIDER - NSDCCPTREATMENT_GEN_ALL_CORE_FT
PRINCIPAL PROCEDURE  Procedure: Removal of loop recorder  Findings and Treatment: Loop Recorder Incision Care:     - Do not touch the incision until it is completely healed.   - There is Dermabond (skin glue) on your incision, which will start to flake off on its own over the next 2-3 weeks. Do not pick at or peel off the Dermabond.   - Do not apply soaps, creams, lotions, ointments or powders to the incision until it is completely healed.  - You should call the doctor if you notice redness, drainage, swelling, increased tenderness, hot sensation around the  incision, bleeding or incision edges pulling apart.

## 2022-11-14 NOTE — ED ADULT NURSE NOTE - SKIN INTEGRITY
Patient requesting refill of tamsulosin.     Current dose: Tamsulosin 0.4 mg - take 1 capsule(s) daily after a meal.     ** All must be in the last 12 months. **    Last office visit: 3/3/2022    Last seen for: Urge incontinence    Last bladder scan (if in retention):   Results for orders placed or performed in visit on 03/03/22   POST VOID RES URINE/BL BY US   Result Value Ref Range    BLDR Scan mLs 203 ml       Last PSA:   Prostate Specific Antigen (ng/mL)   Date Value   07/21/2022 0.45        Last time medication refilled: 4/18/2022    Prescription refill sent to patient's preferred pharmacy at this time.   intact

## 2023-02-13 ENCOUNTER — EMERGENCY (EMERGENCY)
Facility: HOSPITAL | Age: 68
LOS: 1 days | Discharge: HOME | End: 2023-02-13
Attending: EMERGENCY MEDICINE
Payer: COMMERCIAL

## 2023-02-13 VITALS
RESPIRATION RATE: 20 BRPM | TEMPERATURE: 99 F | SYSTOLIC BLOOD PRESSURE: 182 MMHG | OXYGEN SATURATION: 100 % | HEART RATE: 103 BPM | DIASTOLIC BLOOD PRESSURE: 82 MMHG | WEIGHT: 194.67 LBS

## 2023-02-13 DIAGNOSIS — Z98.890 OTHER SPECIFIED POSTPROCEDURAL STATES: Chronic | ICD-10-CM

## 2023-02-13 LAB
ALBUMIN SERPL ELPH-MCNC: 4.2 G/DL — SIGNIFICANT CHANGE UP (ref 3.3–5.2)
ALP SERPL-CCNC: 98 U/L — SIGNIFICANT CHANGE UP (ref 40–120)
ALT FLD-CCNC: 23 U/L — SIGNIFICANT CHANGE UP
ANION GAP SERPL CALC-SCNC: 12 MMOL/L — SIGNIFICANT CHANGE UP (ref 5–17)
AST SERPL-CCNC: 31 U/L — SIGNIFICANT CHANGE UP
BASOPHILS # BLD AUTO: 0.03 K/UL — SIGNIFICANT CHANGE UP (ref 0–0.2)
BASOPHILS NFR BLD AUTO: 0.3 % — SIGNIFICANT CHANGE UP (ref 0–2)
BILIRUB SERPL-MCNC: 0.2 MG/DL — LOW (ref 0.4–2)
BUN SERPL-MCNC: 14 MG/DL — SIGNIFICANT CHANGE UP (ref 8–20)
CALCIUM SERPL-MCNC: 9.5 MG/DL — SIGNIFICANT CHANGE UP (ref 8.4–10.5)
CHLORIDE SERPL-SCNC: 98 MMOL/L — SIGNIFICANT CHANGE UP (ref 96–108)
CO2 SERPL-SCNC: 24 MMOL/L — SIGNIFICANT CHANGE UP (ref 22–29)
CREAT SERPL-MCNC: 1.3 MG/DL — SIGNIFICANT CHANGE UP (ref 0.5–1.3)
D DIMER BLD IA.RAPID-MCNC: <150 NG/ML DDU — SIGNIFICANT CHANGE UP
EGFR: 61 ML/MIN/1.73M2 — SIGNIFICANT CHANGE UP
EOSINOPHIL # BLD AUTO: 0.05 K/UL — SIGNIFICANT CHANGE UP (ref 0–0.5)
EOSINOPHIL NFR BLD AUTO: 0.5 % — SIGNIFICANT CHANGE UP (ref 0–6)
GLUCOSE SERPL-MCNC: 130 MG/DL — HIGH (ref 70–99)
HCT VFR BLD CALC: 49.9 % — SIGNIFICANT CHANGE UP (ref 39–50)
HGB BLD-MCNC: 16.5 G/DL — SIGNIFICANT CHANGE UP (ref 13–17)
IMM GRANULOCYTES NFR BLD AUTO: 0.2 % — SIGNIFICANT CHANGE UP (ref 0–0.9)
LYMPHOCYTES # BLD AUTO: 2.81 K/UL — SIGNIFICANT CHANGE UP (ref 1–3.3)
LYMPHOCYTES # BLD AUTO: 30.9 % — SIGNIFICANT CHANGE UP (ref 13–44)
MAGNESIUM SERPL-MCNC: 1.9 MG/DL — SIGNIFICANT CHANGE UP (ref 1.6–2.6)
MCHC RBC-ENTMCNC: 28.8 PG — SIGNIFICANT CHANGE UP (ref 27–34)
MCHC RBC-ENTMCNC: 33.1 GM/DL — SIGNIFICANT CHANGE UP (ref 32–36)
MCV RBC AUTO: 87.2 FL — SIGNIFICANT CHANGE UP (ref 80–100)
MONOCYTES # BLD AUTO: 0.65 K/UL — SIGNIFICANT CHANGE UP (ref 0–0.9)
MONOCYTES NFR BLD AUTO: 7.1 % — SIGNIFICANT CHANGE UP (ref 2–14)
NEUTROPHILS # BLD AUTO: 5.54 K/UL — SIGNIFICANT CHANGE UP (ref 1.8–7.4)
NEUTROPHILS NFR BLD AUTO: 61 % — SIGNIFICANT CHANGE UP (ref 43–77)
NT-PROBNP SERPL-SCNC: 47 PG/ML — SIGNIFICANT CHANGE UP (ref 0–300)
PLATELET # BLD AUTO: 220 K/UL — SIGNIFICANT CHANGE UP (ref 150–400)
POTASSIUM SERPL-MCNC: 4.1 MMOL/L — SIGNIFICANT CHANGE UP (ref 3.5–5.3)
POTASSIUM SERPL-SCNC: 4.1 MMOL/L — SIGNIFICANT CHANGE UP (ref 3.5–5.3)
PROT SERPL-MCNC: 7.5 G/DL — SIGNIFICANT CHANGE UP (ref 6.6–8.7)
RBC # BLD: 5.72 M/UL — SIGNIFICANT CHANGE UP (ref 4.2–5.8)
RBC # FLD: 13.1 % — SIGNIFICANT CHANGE UP (ref 10.3–14.5)
SODIUM SERPL-SCNC: 134 MMOL/L — LOW (ref 135–145)
TROPONIN T SERPL-MCNC: <0.01 NG/ML — SIGNIFICANT CHANGE UP (ref 0–0.06)
WBC # BLD: 9.1 K/UL — SIGNIFICANT CHANGE UP (ref 3.8–10.5)
WBC # FLD AUTO: 9.1 K/UL — SIGNIFICANT CHANGE UP (ref 3.8–10.5)

## 2023-02-13 PROCEDURE — 71045 X-RAY EXAM CHEST 1 VIEW: CPT | Mod: 26

## 2023-02-13 PROCEDURE — 93010 ELECTROCARDIOGRAM REPORT: CPT

## 2023-02-13 PROCEDURE — 99285 EMERGENCY DEPT VISIT HI MDM: CPT

## 2023-02-13 RX ORDER — MAGNESIUM SULFATE 500 MG/ML
1 VIAL (ML) INJECTION ONCE
Refills: 0 | Status: COMPLETED | OUTPATIENT
Start: 2023-02-13 | End: 2023-02-13

## 2023-02-13 RX ADMIN — Medication 0.2 MILLIGRAM(S): at 23:29

## 2023-02-13 RX ADMIN — Medication 100 GRAM(S): at 22:48

## 2023-02-13 NOTE — CONSULT NOTE ADULT - NS ATTEND AMEND GEN_ALL_CORE FT
Patient seen and examined at bedside notes that overnight he had episodes of palpitations with associated dizziness and lightheadedness, and went to urgent care and recommended to follow up in the ER     Patient sees Dr. Thakkar in the office and is s/p SVT ablation and follows with general cardiology Dr. Romo   On telemetry monitor sinus rhythm with no evidence of SVT   Blood pressure is controlled   QTC on the EKG was called to be 577msec and likely was counting the U wave on the EKG and rechecked QTC, 360 ms   continue with Verapamil   obtain TTE to assess LV function and valvulopathy, if normal and no change in LV function can be discharged home and follow up with Dr Thakkar in the office will need event monitor and also follow up with Dr. Clarissa Beckford D.O. [facility]  Cardiology/Vascular Cardiology -[facility] Cardiology   Telephone # [phone]

## 2023-02-13 NOTE — ED PROVIDER NOTE - OBJECTIVE STATEMENT
66 yo M HTN  woke up this Am with heart beating fast associated with lightheadedness especially with standing.  No chest pain, no sob, no diaphoresis or nausea  Took  mg 68 yo M HTN, SVT s/p ablation March 2022 on verapamil p/w palpitations. woke up this Am with heart beating fast associated with lightheadedness especially with standing.  No chest pain, no sob, no diaphoresis or nausea  Took  mg  Dr. Goodrich cardilogist, had echo last month and  was told he had LVH. No heart failure  no travel, leg swelling, no surgeries, no hx blood clots,no hormone ure, no ca hx, no AC use, no hemoptysis 68 yo M HTN on clonidine, chlorthalidone, SVT s/p ablation March 2022 on verapamil p/w palpitations. woke up this Am with heart beating fast associated with lightheadedness especially with standing. Palpitations lasted a few minutes and resolved spontaneously. took 2 baby aspirin after the palpitations resolved.  Went to urgent care, got an EKG, instructed to come to the ED due to T wave inversions.   No chest pain, no sob, no diaphoresis or nausea  Took  mg  Dr. Goodrich cardilogist, had echo last month and  was told he had LVH. No heart failure  no travel, leg swelling, no surgeries, no hx blood clots,no hormone ure, no ca hx, no AC use, no hemoptysis 66 yo M HTN on clonidine, chlorthalidone, SVT s/p ablation March 2022 on verapamil p/w palpitations. woke up this Am with heart beating fast associated with lightheadedness especially with standing. Palpitations lasted a few minutes and resolved spontaneously. took 2 baby aspirin after the palpitations resolved.  Went to urgent care, got an EKG, instructed to come to the ED due to T wave inversions.   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 No heart failure  no travel, leg swelling, no surgeries, no hx blood clots,no hormone ure, no ca hx, no AC use, no hemoptysis

## 2023-02-13 NOTE — CONSULT NOTE ADULT - ASSESSMENT
66yo M w/ PMHx of HTN on Clonidine, Chlorthalidone, SVT s/p ablation March 2022 on Verapamil, extended history of palpitations for about 5 years and has been following by EP, underwent IRL implantation on 3/20/2019 which showed SVT and NSVT.  ILR form (4/12/22) revealed no events since procedure. Patient's device removed after it reached EOL.  Woke up this morning with "heart beating fast associated with lightheadedness especially with standing."  Palpitations lasted a few minutes and resolved spontaneously.  Patient took 2 baby ASA after the palpitations resolved.  Went to Urgent care, got an EKG and instructed to come to [facility] EDU due to "T wave inversions."

## 2023-02-13 NOTE — ED ADULT TRIAGE NOTE - CHIEF COMPLAINT QUOTE
sent by [place] MD. c/o of chest discomfort, palpitations since this morning, Report it feels hot on the left side.  Pt took 2 aspirin.  hx of SVT had an ablation

## 2023-02-13 NOTE — ED ADULT NURSE NOTE - NSIMPLEMENTINTERV_GEN_ALL_ED
Implemented All Universal Safety Interventions:  [place] to call system. Call bell, personal items and telephone within reach. Instruct patient to call for assistance. Room bathroom lighting operational. Non-slip footwear when patient is off stretcher. Physically safe environment: no spills, clutter or unnecessary equipment. Stretcher in lowest position, wheels locked, appropriate side rails in place.

## 2023-02-13 NOTE — ED PROVIDER NOTE - PROGRESS NOTE DETAILS
ekg with prolonged qtc, concern for possible arrhythmia causing symptoms, pt states he has hx low Mg and K for which he takes supplements. patient placed empirically on pads and given mg repletion. case discussed with DIGNA Romero from cardiology who agrees with plan and for admission for EP Rosalind Cedillo MD Attending Physician- case discussed with cardiology, recommending : Monitor on Tele in CDU overnight for acute arrhythmias or recurrent palpitations, trend enzymes. endorsed to cdu DIGNA Lau

## 2023-02-13 NOTE — ED ADULT NURSE NOTE - OBJECTIVE STATEMENT
alert and oriented x4. Pt brought to Ed from home complaining of  palpitations/diarrhea since this morning, Pt took 2 aspirin this morning. PMH of SVT had an ablation. Denies chest pain, shortness of breath, fever, chills, headache, nausea. Respirations equal/unlabored. Skin warm dry intact.  Pt placed on continuos cardiac monitoring, NSR &  WNL on RA. MD at bedside. IV placed, labs drawn and sent.

## 2023-02-13 NOTE — CONSULT NOTE ADULT - SUBJECTIVE AND OBJECTIVE BOX
[facility]                                              CARDIOLOGY AT [address], [place] [address]                                             Telephone: [phone]. Fax:[phone]                                                       CARDIOLOGY CONSULTATION NOTE                                                                                             History obtained by: Patient and medical record  Community Cardiologist: [facility] Cardiology/EP   obtained: Yes [  ] No [  ]  Reason for Consultation: palpitations  Available out pt records reviewed: Yes [X] No [  ]    Chief complaint:  I had palpitations earlier    HPI: Patient is a 68yo M w/ PMHx of HTN on Clonidine, Chlorthalidone, SVT s/p ablation March 2022 on Verapamil, extended history of palpitations for about 5 years and has been following by EP, underwent IRL implantation on 3/20/2019 which showed SVT and NSVT.  ILR form (4/12/22) revealed no events since procedure. Patient's device removed after it reached EOL.  Woke up this morning with "heart beating fast associated with lightheadedness especially with standing."  Palpitations lasted a few minutes and resolved spontaneously.  Patient took 2 baby ASA after the palpitations resolved.  Went to Urgent care, got an EKG and instructed to come to [facility] EDU due to "T wave inversions."  Patient denies chest pain, dyspnea, diaphoresis, HA, slurred speech, nausea or abdominal pain.  No recent travel history, surgeries, or hx blood clots.     CARDIAC TESTING   ECHO: Pen    STRESS:  CATH:   ELECTROPHYSIOLOGY: Pen    PAST MEDICAL HISTORY  GERD (gastroesophageal reflux disease)  High cholesterol  Palpitations  Hypertension  H/O supraventricular tachycardia  Hypothyroid  At risk for sleep apnea    PAST SURGICAL HISTORY  arthroscopy of both shoulders  ILR placement and removal  SVT s/p ablation March 2022    SOCIAL HISTORY:  Denies smoking/alcohol/drugs    FAMILY HISTORY: Family history of coronary artery disease (Father, Mother)    Family History of Cardiovascular Disease:  Yes [  ] No [  ]  Coronary Artery Disease in first degree relative: Yes [  ] No [  ]  Sudden Cardiac Death in First degree relative: Yes [  ] No [  ]    HOME MEDICATIONS:  Centrum: 1 tab(s) orally once a day (07 Sep 2022 07:47)  cloNIDine 0.2 mg oral tablet: 1 tab(s) orally 2 times a day (07 Sep 2022 07:47)  Klor-Con 10 oral tablet, extended release: 1 tab(s) orally 2 times a day (07 Sep 2022 07:47)  Lipitor 10 mg oral tablet: 1 tab(s) orally once a day (07 Sep 2022 07:47)  magnesium oxide 400 mg oral tablet: 1 tab(s) orally once a day (07 Sep 2022 07:47)  sildenafil 100 mg oral tablet: 1 tab(s) orally once a day, As Needed (07 Sep 2022 07:47)  solifenacin 5 mg oral tablet: 1 tab(s) orally once a day (at bedtime) (07 Sep 2022 07:47)  verapamil 240 mg/24 hours oral capsule, extended release: 1 cap(s) orally once a day (07 Sep 2022 07:47)  vitamin E 400 intl units oral capsule: 1 cap(s) orally once a day (07 Sep 2022 07:47)    CURRENT CARDIAC MEDICATIONS:  See above    ALLERGIES: lisinopril (Angioedema)    REVIEW OF SYMPTOMS:   CONSTITUTIONAL: No fever, no chills, no weight loss, no weight gain, no fatigue   ENMT:  No vertigo; No sinus or throat pain  NECK: No pain or stiffness  CARDIOVASCULAR: No chest pain, no dyspnea, no syncope/presyncope, + palpitations, no dizziness, no Orthopnea, no Paroxsymal nocturnal dyspnea  RESPIRATORY: no Shortness of breath, no cough, no wheezing  : No dysuria, no hematuria   GI: No dark color stool, no nausea, no diarrhea, no constipation, no abdominal pain   NEURO: No headache, no slurred speech   MUSCULOSKELETAL: No joint pain or swelling; No muscle, back, or extremity pain  PSYCH: No agitation, no anxiety    ALL OTHER REVIEW OF SYSTEMS ARE NEGATIVE.    VITAL SIGNS:  T(C): 36.7 (02-13-23 @ 23:34), Max: 37.1 (02-13-23 @ 21:15)  T(F): 98.1 (02-13-23 @ 23:34), Max: 98.7 (02-13-23 @ 21:15)  HR: 81 (02-13-23 @ 23:34) (81 - 103)  BP: 156/75 (02-13-23 @ 23:34) (156/75 - 194/86)  RR: 13 (02-13-23 @ 23:34) (13 - 20)  SpO2: 100% (02-13-23 @ 23:34) (100% - 100%)    INTAKE AND OUTPUT:     PHYSICAL EXAM:  Constitutional: Comfortable, no acute distress   HEENT: Atraumatic, neck is supple, no JVD  CNS: A&Ox3, no focal deficits   Respiratory: CTAB, unlabored   Cardiovascular: RRR, normal S1S2, no murmur or rubs  Gastrointestinal: Soft, non-tender +Bowel sounds   Extremities: 2+ Peripheral Pulses, No clubbing, cyanosis, or edema  Psychiatric: Calm, no agitation   Skin: Warm and dry, no ulcers on extremities     LABS:  ( 13 Feb 2023 22:04 )  Troponin T  <0.01,  CPK  X    , CKMB  X    , BNP 47                          16.5   9.10  )-----------( 220      ( 13 Feb 2023 22:04 )             49.9     02-13    134<L>  |  98  |  14.0  ----------------------------<  130<H>  4.1   |  24.0  |  1.30    Ca    9.5      13 Feb 2023 22:04  Mg     1.9     02-13    TPro  7.5  /  Alb  4.2  /  TBili  0.2<L>  /  DBili  x   /  AST  31  /  ALT  23  /  AlkPhos  98  02-13    INTERPRETATION OF TELEMETRY: Sinus no ectopy  ECG: NSR@99bpm, prolonged QTc  Prior ECG: Yes [X] No [  ]    RADIOLOGY & ADDITIONAL STUDIES:    X-ray:  Pen                                              [facility]                                              CARDIOLOGY AT [address], [place] [address]                                             Telephone: [phone]. Fax:[phone]                                                       CARDIOLOGY CONSULTATION NOTE                                                                                             History obtained by: Patient and medical record  Community Cardiologist: [facility] Cardiology/EP   obtained: Yes [  ] No [  ]  Reason for Consultation: palpitations  Available out pt records reviewed: Yes [X] No [  ]    Chief complaint:  I had palpitations earlier    HPI: Patient is a 66yo M w/ PMHx of HTN on Clonidine, Chlorthalidone, SVT s/p ablation March 2022 on Verapamil, extended history of palpitations for about 5 years and has been following by EP, underwent IRL implantation on 3/20/2019 which showed SVT and NSVT.  ILR form (4/12/22) revealed no events since procedure. Patient's device removed after it reached EOL.  Woke up this morning with "heart beating fast associated with lightheadedness especially with standing."  Palpitations lasted a few minutes and resolved spontaneously.  Patient took 2 baby ASA after the palpitations resolved.  Went to Urgent care, got an EKG and instructed to come to [facility] EDU due to "T wave inversions."  Patient denies chest pain, dyspnea, diaphoresis, HA, slurred speech, nausea or abdominal pain.  No recent travel history, surgeries, or hx blood clots.     CARDIAC TESTING   ECHO: Pen  STRESS:  CATH:   ELECTROPHYSIOLOGY: Pen    PAST MEDICAL HISTORY  GERD (gastroesophageal reflux disease)  High cholesterol  Palpitations  Hypertension  H/O supraventricular tachycardia  Hypothyroid  At risk for sleep apnea    PAST SURGICAL HISTORY  arthroscopy of both shoulders  ILR placement and removal  SVT s/p ablation March 2022    SOCIAL HISTORY:  Denies smoking/alcohol/drugs    FAMILY HISTORY: Family history of coronary artery disease (Father, Mother)    Family History of Cardiovascular Disease:  Yes [  ] No [  ]  Coronary Artery Disease in first degree relative: Yes [  ] No [  ]  Sudden Cardiac Death in First degree relative: Yes [  ] No [  ]    HOME MEDICATIONS:  Centrum: 1 tab(s) orally once a day (07 Sep 2022 07:47)  cloNIDine 0.2 mg oral tablet: 1 tab(s) orally 2 times a day (07 Sep 2022 07:47)  Klor-Con 10 oral tablet, extended release: 1 tab(s) orally 2 times a day (07 Sep 2022 07:47)  Lipitor 10 mg oral tablet: 1 tab(s) orally once a day (07 Sep 2022 07:47)  magnesium oxide 400 mg oral tablet: 1 tab(s) orally once a day (07 Sep 2022 07:47)  sildenafil 100 mg oral tablet: 1 tab(s) orally once a day, As Needed (07 Sep 2022 07:47)  solifenacin 5 mg oral tablet: 1 tab(s) orally once a day (at bedtime) (07 Sep 2022 07:47)  verapamil 240 mg/24 hours oral capsule, extended release: 1 cap(s) orally once a day (07 Sep 2022 07:47)  vitamin E 400 intl units oral capsule: 1 cap(s) orally once a day (07 Sep 2022 07:47)    CURRENT CARDIAC MEDICATIONS:  See above    ALLERGIES: lisinopril (Angioedema)    REVIEW OF SYMPTOMS:   CONSTITUTIONAL: No fever, no chills, no weight loss, no weight gain, no fatigue   ENMT:  No vertigo; No sinus or throat pain  NECK: No pain or stiffness  CARDIOVASCULAR: No chest pain, no dyspnea, no syncope/presyncope, + palpitations, no dizziness, no Orthopnea, no Paroxsymal nocturnal dyspnea  RESPIRATORY: no Shortness of breath, no cough, no wheezing  : No dysuria, no hematuria   GI: No dark color stool, no nausea, no diarrhea, no constipation, no abdominal pain   NEURO: No headache, no slurred speech   MUSCULOSKELETAL: No joint pain or swelling; No muscle, back, or extremity pain  PSYCH: No agitation, no anxiety    ALL OTHER REVIEW OF SYSTEMS ARE NEGATIVE.    VITAL SIGNS:  T(C): 36.7 (02-13-23 @ 23:34), Max: 37.1 (02-13-23 @ 21:15)  T(F): 98.1 (02-13-23 @ 23:34), Max: 98.7 (02-13-23 @ 21:15)  HR: 81 (02-13-23 @ 23:34) (81 - 103)  BP: 156/75 (02-13-23 @ 23:34) (156/75 - 194/86)  RR: 13 (02-13-23 @ 23:34) (13 - 20)  SpO2: 100% (02-13-23 @ 23:34) (100% - 100%)    INTAKE AND OUTPUT:     PHYSICAL EXAM:  Constitutional: Comfortable, no acute distress   HEENT: Atraumatic, neck is supple, no JVD  CNS: A&Ox3, no focal deficits   Respiratory: CTAB, unlabored   Cardiovascular: RRR, normal S1S2, no murmur or rubs  Gastrointestinal: Soft, non-tender +Bowel sounds   Extremities: 2+ Peripheral Pulses, No clubbing, cyanosis, or edema  Psychiatric: Calm, no agitation   Skin: Warm and dry, no ulcers on extremities     LABS:  ( 13 Feb 2023 22:04 )  Troponin T  <0.01,  CPK  X    , CKMB  X    , BNP 47                          16.5   9.10  )-----------( 220      ( 13 Feb 2023 22:04 )             49.9     02-13    134<L>  |  98  |  14.0  ----------------------------<  130<H>  4.1   |  24.0  |  1.30    Ca    9.5      13 Feb 2023 22:04  Mg     1.9     02-13    TPro  7.5  /  Alb  4.2  /  TBili  0.2<L>  /  DBili  x   /  AST  31  /  ALT  23  /  AlkPhos  98  02-13    INTERPRETATION OF TELEMETRY: Sinus no ectopy  ECG: NSR@99bpm, prolonged QTc  Prior ECG: Yes [X] No [  ]    RADIOLOGY & ADDITIONAL STUDIES:    X-ray:  Pen

## 2023-02-13 NOTE — ED PROVIDER NOTE - PHYSICAL EXAMINATION
Gen: AAOx3, NAD, well nourished  HEENT: Normocephalic atraumatic. EOMI. No scleral icterus. Moist mucus membranes.  CV: RRR. Audible S1 and S2. No murmurs. 2+ radial and PT pulses   Pulm: Clear to auscultation bilaterally. No wheezes, rales, or rhonchi. No accessory muscle use or respiratory distress.  Abdomen: soft, normoactive BS, non distended, nontender, no rebound, no guarding  Musculoskeletal:  Moving all extremities equally. No gross deformity. No tenderness to palpation.  Skin: No rashes or lesions. Warm.  Neurologic: No focal neurological deficits. CN II-XII grossly intact.  : no CVA tenderness  Psych: Appropriate mood and affect. Cooperative. Gen: AAOx3, NAD, well nourished  HEENT: Normocephalic atraumatic. EOMI. No scleral icterus. Moist mucus membranes.  CV: mildly tachycardic rate. Audible S1 and S2. No murmurs. 2+ radial and PT pulses   Pulm: Clear to auscultation bilaterally. No wheezes, rales, or rhonchi. No accessory muscle use or respiratory distress.  Abdomen: soft, normoactive BS, non distended, nontender, no rebound, no guarding  Musculoskeletal:  Moving all extremities equally. No gross deformity. No tenderness to palpation.  Skin: No rashes or lesions. Warm.  Neurologic: No focal neurological deficits. CN II-XII grossly intact.  : no CVA tenderness  Psych: Appropriate mood and affect. Cooperative.

## 2023-02-13 NOTE — ED PROVIDER NOTE - NS ED ROS FT
General: No fever, chills.  EENT: No vision changes, hearing changes, nasal congestion, throat pain, difficulty swallowing.  Respiratory: No SOB, cough, wheezing.  Cardiac: No chest pain, lower extremity edema. +palpitations  GI: No abdominal pain, nausea, vomiting, diarrhea, constipation. +increased bowel movements today  : No difficulty with urination, pain with urination, hematuria.  Skin: No rashes.  Neuro: No headache, dizziness, lightheadedness.  MSK: No muscle pain, joint pain, back pain.  Psych: No known mental health issues.  Endocrine: No heat/cold intolerance, no polyuria/polydipsia.  Heme: No easy bruising or bleeding.  Allergic: No pruritis, dermatitis, or environmental allergies. General: No fever, chills.  EENT: No vision changes, hearing changes, nasal congestion, throat pain, difficulty swallowing.  Respiratory: No SOB, cough, wheezing.  Cardiac: No chest pain, lower extremity edema. +palpitations  GI: No abdominal pain, nausea, vomiting, diarrhea, constipation. +increased bowel movements today  : No difficulty with urination, pain with urination, hematuria.  Skin: No rashes.  Neuro: No headache, no dizziness, + lightheadedness.  MSK: No muscle pain, joint pain, back pain.  Psych: No known mental health issues.  Endocrine: No heat/cold intolerance, no polyuria/polydipsia.  Heme: No easy bruising or bleeding.  Allergic: No pruritis, dermatitis, or environmental allergies.

## 2023-02-13 NOTE — ED PROVIDER NOTE - CLINICAL SUMMARY MEDICAL DECISION MAKING FREE TEXT BOX
68 yo M HTN on clonidine, chlorthalidone, SVT s/p ablation March 2022 on verapamil p/w palpitations, now resolved and with no complaints. Will get cardiac labs and D-dimer. Treat with magnesium for prolonged QT. 66 yo M HTN on clonidine, chlorthalidone, SVT s/p ablation March 2022 on verapamil p/w palpitations, now resolved and with no complaints. low suspicion for ACS, however concern for possible recurrent arrhythmia. cannot perc out 2/2 age and tachycardia, will get screening d-dimer. labs, xray chest, empiric Mag for prolonged qtc, cards consult, anticipate admission.

## 2023-02-13 NOTE — ED ADULT TRIAGE NOTE - BP NONINVASIVE SYSTOLIC (MM HG)
My findings and recommendations TODAY are based on the patient's symptoms, exam, diagnostic testing and records. 182

## 2023-02-13 NOTE — CONSULT NOTE ADULT - PROBLEM SELECTOR RECOMMENDATION 9
Monitor on Tele in CDU overnight for acute arrhythmias or recurrent palpitations, check labs including CBC, BMP, trend CE x3   - FLP and A1C in AM, check ECG and CXR tonight  - continue home Verapamil 240mg daily dose, low cholesterol diet, monitor lFts  - EP to see patient in AM  might be arrhythmias causing palpitations (tachyarrhythmias, bradyarrhythmias, and ectopic beats) in lieu of prior underlying conduction system abnormality  - generalized anxiety, panic disorder, somatization disorder    case d/w Dr. Beckford

## 2023-02-14 ENCOUNTER — NON-APPOINTMENT (OUTPATIENT)
Age: 68
End: 2023-02-14

## 2023-02-14 VITALS
TEMPERATURE: 99 F | SYSTOLIC BLOOD PRESSURE: 159 MMHG | OXYGEN SATURATION: 100 % | DIASTOLIC BLOOD PRESSURE: 77 MMHG | HEART RATE: 78 BPM | RESPIRATION RATE: 17 BRPM

## 2023-02-14 DIAGNOSIS — R00.2 PALPITATIONS: ICD-10-CM

## 2023-02-14 DIAGNOSIS — R94.31 ABNORMAL ELECTROCARDIOGRAM [ECG] [EKG]: ICD-10-CM

## 2023-02-14 LAB
A1C WITH ESTIMATED AVERAGE GLUCOSE RESULT: 6.2 % — HIGH (ref 4–5.6)
CHOLEST SERPL-MCNC: 130 MG/DL — SIGNIFICANT CHANGE UP
ESTIMATED AVERAGE GLUCOSE: 131 MG/DL — HIGH (ref 68–114)
FLUAV AG NPH QL: SIGNIFICANT CHANGE UP
FLUBV AG NPH QL: SIGNIFICANT CHANGE UP
HDLC SERPL-MCNC: 51 MG/DL — SIGNIFICANT CHANGE UP
LIPID PNL WITH DIRECT LDL SERPL: 70 MG/DL — SIGNIFICANT CHANGE UP
NON HDL CHOLESTEROL: 79 MG/DL — SIGNIFICANT CHANGE UP
RSV RNA NPH QL NAA+NON-PROBE: SIGNIFICANT CHANGE UP
SARS-COV-2 RNA SPEC QL NAA+PROBE: SIGNIFICANT CHANGE UP
T3 SERPL-MCNC: 138 NG/DL — SIGNIFICANT CHANGE UP (ref 80–200)
T4 AB SER-ACNC: 6.9 UG/DL — SIGNIFICANT CHANGE UP (ref 4.5–12)
TRIGL SERPL-MCNC: 46 MG/DL — SIGNIFICANT CHANGE UP
TROPONIN T SERPL-MCNC: <0.01 NG/ML — SIGNIFICANT CHANGE UP (ref 0–0.06)
TROPONIN T SERPL-MCNC: <0.01 NG/ML — SIGNIFICANT CHANGE UP (ref 0–0.06)
TSH SERPL-MCNC: 0.96 UIU/ML — SIGNIFICANT CHANGE UP (ref 0.27–4.2)

## 2023-02-14 PROCEDURE — 80061 LIPID PANEL: CPT

## 2023-02-14 PROCEDURE — 83036 HEMOGLOBIN GLYCOSYLATED A1C: CPT

## 2023-02-14 PROCEDURE — 99222 1ST HOSP IP/OBS MODERATE 55: CPT

## 2023-02-14 PROCEDURE — 85025 COMPLETE CBC W/AUTO DIFF WBC: CPT

## 2023-02-14 PROCEDURE — 99236 HOSP IP/OBS SAME DATE HI 85: CPT

## 2023-02-14 PROCEDURE — 84480 ASSAY TRIIODOTHYRONINE (T3): CPT

## 2023-02-14 PROCEDURE — 36415 COLL VENOUS BLD VENIPUNCTURE: CPT

## 2023-02-14 PROCEDURE — 83735 ASSAY OF MAGNESIUM: CPT

## 2023-02-14 PROCEDURE — 84436 ASSAY OF TOTAL THYROXINE: CPT

## 2023-02-14 PROCEDURE — 83880 ASSAY OF NATRIURETIC PEPTIDE: CPT

## 2023-02-14 PROCEDURE — 93005 ELECTROCARDIOGRAM TRACING: CPT

## 2023-02-14 PROCEDURE — 85379 FIBRIN DEGRADATION QUANT: CPT

## 2023-02-14 PROCEDURE — 87637 SARSCOV2&INF A&B&RSV AMP PRB: CPT

## 2023-02-14 PROCEDURE — 99285 EMERGENCY DEPT VISIT HI MDM: CPT | Mod: 25

## 2023-02-14 PROCEDURE — 80053 COMPREHEN METABOLIC PANEL: CPT

## 2023-02-14 PROCEDURE — 93306 TTE W/DOPPLER COMPLETE: CPT | Mod: 26

## 2023-02-14 PROCEDURE — 84484 ASSAY OF TROPONIN QUANT: CPT

## 2023-02-14 PROCEDURE — 93306 TTE W/DOPPLER COMPLETE: CPT

## 2023-02-14 PROCEDURE — 96374 THER/PROPH/DIAG INJ IV PUSH: CPT

## 2023-02-14 PROCEDURE — 84443 ASSAY THYROID STIM HORMONE: CPT

## 2023-02-14 PROCEDURE — G0378: CPT

## 2023-02-14 PROCEDURE — 71045 X-RAY EXAM CHEST 1 VIEW: CPT

## 2023-02-14 RX ORDER — VERAPAMIL HCL 240 MG
240 CAPSULE, EXTENDED RELEASE PELLETS 24 HR ORAL DAILY
Refills: 0 | Status: DISCONTINUED | OUTPATIENT
Start: 2023-02-14 | End: 2023-02-21

## 2023-02-14 RX ORDER — ATORVASTATIN CALCIUM 80 MG/1
10 TABLET, FILM COATED ORAL AT BEDTIME
Refills: 0 | Status: DISCONTINUED | OUTPATIENT
Start: 2023-02-14 | End: 2023-02-21

## 2023-02-14 RX ORDER — CHLORTHALIDONE 50 MG
25 TABLET ORAL DAILY
Refills: 0 | Status: DISCONTINUED | OUTPATIENT
Start: 2023-02-14 | End: 2023-02-21

## 2023-02-14 RX ADMIN — Medication 25 MILLIGRAM(S): at 05:57

## 2023-02-14 RX ADMIN — Medication 240 MILLIGRAM(S): at 05:57

## 2023-02-14 RX ADMIN — Medication 0.2 MILLIGRAM(S): at 05:56

## 2023-02-14 NOTE — PROGRESS NOTE ADULT - ASSESSMENT
67 year old male with PMHx of HTN, hyperlipidemia, GERD, formerly hyperthyroidism treated with methimazole now euthyroid, palpitations s/p ILR implant 3/2019, and long RP SVT s/p AVNRT ablation with Dr. Thakkar on 3/16/22. ILR reached EOL and was explanted on 9/2022. last device interrogation prior to removal revealed no events since procedure.    He presented to the ED with palpations which occurred early yesterday morning ~7am. Pt does reports an argument with his child prior ~1:30 AM. Episode lasted < 1 min and denies any associated CP, dizziness, near syncope, or syncope. He does report  2 additional episodes throughout the day - both lasting seconds and again without any associated CP, dizziness, near syncope or syncope. He does reports episodes of lightheadedness yesterday throughout  the day with ambulation but not during palpitations. Pt takes Verapamil 240mg daily and reports medication compliance. He denies any recent fevers, chills, cough, SOB, abdominal pain, N/V or ill contacts.     Recommendations:    1. Palpitations  - TTE pending  - Add TSH/T3/T4  - If echo and thyroid functions negative, can be discharge for EP perspective with out patient follow up with Dr. Thakkar.  - Continue with Verapamil 240mg daily.   - 30d MCOT for continue monitoring to correlate with symptoms. Patient to be  at office after discharge. Will organize with office staff.     2. HgA1c = 6.2  - Pt without known history of DM  - Management per primary team    67 year old male with PMHx of HTN, hyperlipidemia, GERD, formerly hyperthyroidism treated with methimazole now euthyroid, palpitations s/p ILR implant 3/2019, and long RP SVT s/p AVNRT ablation with Dr. Thakkar on 3/16/22. ILR reached EOL and was explanted on 9/2022. last device interrogation prior to removal revealed no events since procedure.    He presented to the ED with palpations which occurred early yesterday morning ~7am. Pt does reports an argument with his child prior ~1:30 AM. Episode lasted < 1 min and denies any associated CP, dizziness, near syncope, or syncope. He does report  2 additional episodes throughout the day - both lasting seconds and again without any associated CP, dizziness, near syncope or syncope. He does reports episodes of lightheadedness yesterday throughout  the day with ambulation but not during palpitations. Pt takes Verapamil 240mg daily and reports medication compliance. He denies any recent fevers, chills, cough, SOB, abdominal pain, N/V or ill contacts.     Recommendations:    1. Palpitations  - History of SVT s/p AVNRT ablation with Dr. Thakkar on 3/16/22  - TTE pending  - Add TSH/T3/T4  - If echo and thyroid functions negative, can be discharge for EP perspective with out patient follow up with Dr. Thakkar.  - Continue with Verapamil 240mg daily.   - 30d MCOT for continue monitoring to correlate with symptoms. Patient to be  at office after discharge. Will organize with office staff.     2. HgA1c = 6.2  - Pt without known history of DM  - Management per primary team

## 2023-02-14 NOTE — ED CDU PROVIDER DISPOSITION NOTE - WR ORDER STATUS 1
Phone call from writer to patient regarding pts call of cough and drainage, pt states \"I just picked up my Proair from pharmacy, I think this will do it\". Pt has no other concerns.    Additional Information  • Affirmative: Caller has already spoken with another triager and has no further questions.     Caller has already picked up medication from pharmacy, no further concerns.    Protocols used: NO CONTACT OR DUPLICATE CONTACT CALL-A-AH      
Regarding: COUGH AND DRAINAGE/ ASTHMA  ----- Message from Veronica Donovan sent at 7/3/2018  5:40 PM CDT -----  Patient Name: Aubree Gallego  Specialist or PCP:Gonzalo Verma,  Pregnant (If Yes, how long?):NO  Symptoms:COUGH AND DRAINAGE  Call Back #:[phone]  Is the patient’s permanent residence located in [place], [place], or a compact State? Yes [facility] [id number]  Call Center Account #:[id number]    
Resulted

## 2023-02-14 NOTE — ED CDU PROVIDER DISPOSITION NOTE - NSFOLLOWUPINSTRUCTIONS_ED_ALL_ED_FT
please follow up with cardiology outpatient  please continue with verpamil 240 mg daily       Palpitations      Palpitations are feelings that your heartbeat is irregular or is faster than normal. It may feel like your heart is fluttering or skipping a beat. Palpitations may be caused by many things, including smoking, caffeine, alcohol, stress, and certain medicines or drugs. Most causes of palpitations are not serious.     However, some palpitations can be a sign of a serious problem. Further tests and a thorough medical history will be done to find the cause of your palpitations. Your provider may order tests such as an ECG, labs, an echocardiogram, or an ambulatory continuous ECG monitor.      Follow these instructions at home:    Pay attention to any changes in your symptoms. Let your health care provider know about them. Take these actions to help manage your symptoms:    Eating and drinking     Follow instructions from your health care provider about eating or drinking restrictions. You may need to avoid foods and drinks that may cause palpitations. These may include:  •Caffeinated coffee, tea, soft drinks, and energy drinks.      •Chocolate.      •Alcohol.      •Diet pills.        Lifestyle      A person sitting on the floor doing yoga.      A sign telling the reader not to smoke.  •Take steps to reduce your stress and anxiety. Things that can help you relax include:  •Yoga.      •Mind-body activities, such as deep breathing, meditation, or using words and images to create positive thoughts (guided imagery).      •Physical activity, such as swimming, jogging, or walking. Tell your health care provider if your palpitations increase with activity. If you have chest pain or shortness of breath with activity, do not continue the activity until you are seen by your health care provider.      •Biofeedback. This is a method that helps you learn to use your mind to control things in your body, such as your heartbeat.        •Get plenty of rest and sleep. Keep a regular bed time.      •Do not use drugs, including cocaine or ecstasy. Do not use marijuana.      •Do not use any products that contain nicotine or tobacco. These products include cigarettes, chewing tobacco, and vaping devices, such as e-cigarettes. If you need help quitting, ask your health care provider.      General instructions    •Take over-the-counter and prescription medicines only as told by your health care provider.      •Keep all follow-up visits. This is important. These may include visits for further testing if palpitations do not go away or get worse.        Contact a health care provider if:    •You continue to have a fast or irregular heartbeat for a long period of time.      •You notice that your palpitations occur more often.        Get help right away if:    •You have chest pain or shortness of breath.      •You have a severe headache.      •You feel dizzy or you faint.      These symptoms may represent a serious problem that is an emergency. Do not wait to see if the symptoms will go away. Get medical help right away. Call your local emergency services (911 in the [place] Do not drive yourself to the hospital.       Summary    •Palpitations are feelings that your heartbeat is irregular or is faster than normal. It may feel like your heart is fluttering or skipping a beat.      •Palpitations may be caused by many things, including smoking, caffeine, alcohol, stress, certain medicines, and drugs.      •Further tests and a thorough medical history may be done to find the cause of your palpitations.      •Get help right away if you faint or have chest pain, shortness of breath, severe headache, or dizziness.

## 2023-02-14 NOTE — ED CDU PROVIDER DISPOSITION NOTE - PATIENT PORTAL LINK FT
You can access the FollowMyHealth Patient Portal offered by [facility] by registering at the following website: [email or web address]. By joining [facility]’s FollowMyHealth portal, you will also be able to view your health information using other applications (apps) compatible with our system.

## 2023-02-14 NOTE — ED CDU PROVIDER INITIAL DAY NOTE - OBJECTIVE STATEMENT
68 yo M HTN on clonidine, chlorthalidone, SVT s/p ablation March 2022 on verapamil p/w palpitations. woke up this Am with heart beating fast associated with lightheadedness especially with standing. Palpitations lasted a few minutes and resolved spontaneously. took 2 baby aspirin after the palpitations resolved.  Went to urgent care, got an EKG, instructed to come to the ED due to T wave inversions.   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 No heart failure  no travel, leg swelling, no surgeries, no hx blood clots,no hormone ure, no ca hx, no AC use, no hemoptysis

## 2023-02-14 NOTE — ED CDU PROVIDER DISPOSITION NOTE - CARE PROVIDER_API CALL
Lucille Beckford ()  Cardiology; Internal Medicine  [address] [place] [address]  Phone: [phone]  Fax: [phone]  Follow Up Time:     Freddie Hidalgo)  Cardiac Electrophysiology; Cardiovascular Disease; Internal Medicine  [address] [place] [address], [address]  [place] [address]  Phone: [phone]  Fax: [phone]  Follow Up Time:

## 2023-02-14 NOTE — ED ADULT NURSE REASSESSMENT NOTE - NS ED NURSE REASSESS COMMENT FT1
Report given and pt endorsed to receiving АНДРЕЙ Summers for follow up and continuity of care.
Report received at change of shift from outgoing RN and assumed care of pt at that time. Pt awake, alert and oriented x 3, wife at bedside. Pt eating breakfast with good appetite, denies cp, denies palpitation, NSR on monitor. Fall precautions in place, await EP.
Report received from АНДРЕЙ Duvall @ 7002, Pt currently at ECHO. Will assess once pt returns.
Pt A&Ox4, denies chest pain/ palpitations at this time. Pt awaiting ECHO results. Pt's wife at bedside. Patient in understanding of plan of care. Patient with no further questions for the RN. Resting in comfort. Call bell within reach and encouraged to use when assistance needed. Will continue to monitor.

## 2023-02-14 NOTE — ED CDU PROVIDER INITIAL DAY NOTE - CLINICAL SUMMARY MEDICAL DECISION MAKING FREE TEXT BOX
PT with palpitations placed in obs for Diagnostic uncertainty. PT seen BY OBS PA found to be appropriate CDU PT care transferred to PA. PT with palpitations placed in obs for Diagnostic uncertainty and tele monitoring overnight for concern for prolonged qtc. PT seen BY OBS PA found to be appropriate CDU PT care transferred to PA.

## 2023-02-14 NOTE — PROGRESS NOTE ADULT - SUBJECTIVE AND OBJECTIVE BOX
[facility]  [facility]   Office: [address] [address]  Telephone: [phone] Fax:[phone]      HPI: 67 year old male with PMHx of HTN, hyperlipidemia, GERD, formerly hyperthyroidism treated with methimazole now euthyroid, palpitations s/p ILR implant 3/2019, and long RP SVT s/p AVNRT ablation with Dr. Thakkar on 3/16/22. ILR reached EOL and was explanted on 9/2022. last device interrogation prior to removal revealed no events since procedure.    He presented to the ED with palpations which occurred early yesterday morning ~7am. Pt does reports an argument with his child prior ~1:30 AM. Episode lasted < 1 min and denies any associated CP, dizziness, near syncope, or syncope. He does report  2 additional episodes throughout the day - both lasting seconds and again without any associated CP, dizziness, near syncope or syncope. He does reports episodes of lightheadedness yesterday throughout  the day with ambulation but not during palpitations. Pt takes Verapamil 240mg daily and reports medication compliance. He denies any recent fevers, chills, cough, SOB, abdominal pain, N/V or ill contacts.     In ED, noted to be in sinus rhythm and no events noted during telemetry monitoring. Labs wnl.  D-dimer negative, trop negative x 3. TSH not done.     EKG: NSR w/ intact conduction and intervals    Cardiology Summary:   EKG: 3/18/22: Normal sinus rhythm  NST 6/28/21: Normal study; no evidence for myocardial infarction or ischemia.      PAST MEDICAL & SURGICAL HISTORY:  GERD (gastroesophageal reflux disease)  High cholesterol  Palpitations  Hypertension  H/O supraventricular tachycardia  Hypothyroid  resolved 2019- No longer on medications  At risk for sleep apnea  Bang score 4  History of arthroscopy of both shoulders      REVIEW OF SYSTEMS:    CONSTITUTIONAL: No fever, weight loss, or fatigue  NECK: No pain or stiffness  RESPIRATORY: No cough, wheezing, chills or hemoptysis; No shortness of breath  CARDIOVASCULAR: see HPI  GASTROINTESTINAL: No abdominal or epigastric pain. No nausea, vomiting, or hematemesis; No diarrhea or constipation. No melena or hematochezia.  NEUROLOGICAL: No headaches, memory loss, loss of strength, numbness, or tremors  SKIN: No itching, burning, rashes, or lesions   LYMPH NODES: No enlarged glands  ENDOCRINE: No heat or cold intolerance; No hair loss  PSYCHIATRIC: No depression, anxiety, mood swings, or difficulty sleeping  HEME/LYMPH: No easy bruising, or bleeding gums      MEDICATIONS  (STANDING):  atorvastatin 10 milliGRAM(s) Oral at bedtime  chlorthalidone 25 milliGRAM(s) Oral daily  cloNIDine 0.2 milliGRAM(s) Oral two times a day  verapamil  milliGRAM(s) Oral daily    Allergies  lisinopril (Angioedema)      SOCIAL HISTORY:  Denies Tobacco, ETOH, and/or drug use    FAMILY HISTORY:  Family history of coronary artery disease (Father, Mother)        Vital Signs Last 24 Hrs  T(C): 36.7 (14 Feb 2023 07:26), Max: 37.1 (13 Feb 2023 21:15)  T(F): 98 (14 Feb 2023 07:26), Max: 98.7 (13 Feb 2023 21:15)  HR: 69 (14 Feb 2023 07:26) (69 - 103)  BP: 114/69 (14 Feb 2023 07:26) (114/69 - 194/86)  RR: 18 (14 Feb 2023 07:26) (13 - 20)  SpO2: 100% (14 Feb 2023 07:26) (100% - 100%)    Parameters below as of 14 Feb 2023 07:26  Patient On (Oxygen Delivery Method): room air        Physical Exam:  Constitutional: AAOx3, NAD  Neck: supple, No JVD  Cardiovascular: +S1S2 RRR, no murmurs, rubs, gallops   Pulmonary: CTA b/l, unlabored, no wheezes, rales. rhonci  Abdomen: +BS, soft NTND  Extremities: no edema b/l, +distal pulses b/l  Neuro: non focal, speech clear, LARA x 4    LABS:                        16.5   9.10  )-----------( 220      ( 13 Feb 2023 22:04 )             49.9   02-13    134<L>  |  98  |  14.0  ----------------------------<  130<H>  4.1   |  24.0  |  1.30    Ca    9.5      13 Feb 2023 22:04  Mg     1.9     02-13    TPro  7.5  /  Alb  4.2  /  TBili  0.2<L>  /  DBili  x   /  AST  31  /  ALT  23  /  AlkPhos  98  02-13  LIVER FUNCTIONS - ( 13 Feb 2023 22:04 )  Alb: 4.2 g/dL / Pro: 7.5 g/dL / ALK PHOS: 98 U/L / ALT: 23 U/L / AST: 31 U/L / GGT: x           CARDIAC MARKERS ( 14 Feb 2023 06:40 )  x     / <0.01 ng/mL / x     / x     / x      CARDIAC MARKERS ( 14 Feb 2023 01:12 )  x     / <0.01 ng/mL / x     / x     / x      CARDIAC MARKERS ( 13 Feb 2023 22:04 )  x     / <0.01 ng/mL / x     / x     / x              RADIOLOGY & ADDITIONAL STUDIES:       [facility]  [facility]   Office: [address] [place] [address]  Telephone: [phone] Fax:[phone]      HPI: 67 year old male with PMHx of HTN, hyperlipidemia, GERD, formerly hyperthyroidism treated with methimazole now euthyroid, palpitations s/p ILR implant 3/2019, and long RP SVT s/p AVNRT ablation with Dr. Thakkar on 3/16/22. ILR reached EOL and was explanted on 9/2022. last device interrogation prior to removal revealed no events since procedure.    He presented to the ED with palpations which occurred early yesterday morning ~7am. Pt does reports an argument with his child prior ~1:30 AM. Episode lasted < 1 min and denies any associated CP, dizziness, near syncope, or syncope. He does report  2 additional episodes throughout the day - both lasting seconds and again without any associated CP, dizziness, near syncope or syncope. He does reports episodes of lightheadedness yesterday throughout  the day with ambulation but not during palpitations. Pt takes Verapamil 240mg daily and reports medication compliance. He denies any recent fevers, chills, cough, SOB, abdominal pain, N/V or ill contacts.     In ED, noted to be in sinus rhythm and no events noted during telemetry monitoring. Labs wnl.  D-dimer negative, trop negative x 3. TSH not done.     EKG: NSR w/ intact conduction and intervals   TELE: SR; no events     Cardiology Summary:   EKG: 3/18/22: Normal sinus rhythm  NST 6/28/21: Normal study; no evidence for myocardial infarction or ischemia.      PAST MEDICAL & SURGICAL HISTORY:  GERD (gastroesophageal reflux disease)  High cholesterol  Palpitations  Hypertension  H/O supraventricular tachycardia  Hypothyroid  resolved 2019- No longer on medications  At risk for sleep apnea  Bang score 4  History of arthroscopy of both shoulders      REVIEW OF SYSTEMS:    CONSTITUTIONAL: No fever, weight loss, or fatigue  NECK: No pain or stiffness  RESPIRATORY: No cough, wheezing, chills or hemoptysis; No shortness of breath  CARDIOVASCULAR: see HPI  GASTROINTESTINAL: No abdominal or epigastric pain. No nausea, vomiting, or hematemesis; No diarrhea or constipation. No melena or hematochezia.  NEUROLOGICAL: No headaches, memory loss, loss of strength, numbness, or tremors  SKIN: No itching, burning, rashes, or lesions   LYMPH NODES: No enlarged glands  ENDOCRINE: No heat or cold intolerance; No hair loss  PSYCHIATRIC: No depression, anxiety, mood swings, or difficulty sleeping  HEME/LYMPH: No easy bruising, or bleeding gums      MEDICATIONS  (STANDING):  atorvastatin 10 milliGRAM(s) Oral at bedtime  chlorthalidone 25 milliGRAM(s) Oral daily  cloNIDine 0.2 milliGRAM(s) Oral two times a day  verapamil  milliGRAM(s) Oral daily    Allergies  lisinopril (Angioedema)      SOCIAL HISTORY:  Denies Tobacco, ETOH, and/or drug use    FAMILY HISTORY:  Family history of coronary artery disease (Father, Mother)        Vital Signs Last 24 Hrs  T(C): 36.7 (14 Feb 2023 07:26), Max: 37.1 (13 Feb 2023 21:15)  T(F): 98 (14 Feb 2023 07:26), Max: 98.7 (13 Feb 2023 21:15)  HR: 69 (14 Feb 2023 07:26) (69 - 103)  BP: 114/69 (14 Feb 2023 07:26) (114/69 - 194/86)  RR: 18 (14 Feb 2023 07:26) (13 - 20)  SpO2: 100% (14 Feb 2023 07:26) (100% - 100%)    Parameters below as of 14 Feb 2023 07:26  Patient On (Oxygen Delivery Method): room air        Physical Exam:  Constitutional: AAOx3, NAD  Neck: supple, No JVD  Cardiovascular: +S1S2 RRR, no murmurs, rubs, gallops   Pulmonary: CTA b/l, unlabored, no wheezes, rales. rhonci  Abdomen: +BS, soft NTND  Extremities: no edema b/l, +distal pulses b/l  Neuro: non focal, speech clear, LARA x 4    LABS:                        16.5   9.10  )-----------( 220      ( 13 Feb 2023 22:04 )             49.9   02-13    134<L>  |  98  |  14.0  ----------------------------<  130<H>  4.1   |  24.0  |  1.30    Ca    9.5      13 Feb 2023 22:04  Mg     1.9     02-13    TPro  7.5  /  Alb  4.2  /  TBili  0.2<L>  /  DBili  x   /  AST  31  /  ALT  23  /  AlkPhos  98  02-13  LIVER FUNCTIONS - ( 13 Feb 2023 22:04 )  Alb: 4.2 g/dL / Pro: 7.5 g/dL / ALK PHOS: 98 U/L / ALT: 23 U/L / AST: 31 U/L / GGT: x           CARDIAC MARKERS ( 14 Feb 2023 06:40 )  x     / <0.01 ng/mL / x     / x     / x      CARDIAC MARKERS ( 14 Feb 2023 01:12 )  x     / <0.01 ng/mL / x     / x     / x      CARDIAC MARKERS ( 13 Feb 2023 22:04 )  x     / <0.01 ng/mL / x     / x     / x              RADIOLOGY & ADDITIONAL STUDIES:

## 2023-02-14 NOTE — ED CDU PROVIDER INITIAL DAY NOTE - NS ED ROS FT
General: No fever, chills.  EENT: No vision changes, hearing changes, nasal congestion, throat pain, difficulty swallowing.  Respiratory: No SOB, cough, wheezing.  Cardiac: No chest pain, lower extremity edema. +palpitations  GI: No abdominal pain, nausea, vomiting, diarrhea, constipation. +increased bowel movements today  : No difficulty with urination, pain with urination, hematuria.  Skin: No rashes.  Neuro: No headache, no dizziness, + lightheadedness.  MSK: No muscle pain, joint pain, back pain.  Psych: No known mental health issues.  Endocrine: No heat/cold intolerance, no polyuria/polydipsia.  Heme: No easy bruising or bleeding.  Allergic: No pruritis, dermatitis, or environmental allergies.

## 2023-02-14 NOTE — PROGRESS NOTE ADULT - NS ATTEND AMEND GEN_ALL_CORE FT
Patient with prior hx of AVNRT s/p ablation now with 3 brief episodes of palpitations. Tele and ECG unremarkable. Recommend outpatient [facility] and follow up with Dr. Thakkar.

## 2023-02-14 NOTE — ED CDU PROVIDER INITIAL DAY NOTE - PHYSICAL EXAMINATION
Gen: AAOx3, NAD, well nourished  HEENT: Normocephalic atraumatic. EOMI. No scleral icterus. Moist mucus membranes.  CV: mildly tachycardic rate. Audible S1 and S2. No murmurs. 2+ radial and PT pulses   Pulm: Clear to auscultation bilaterally. No wheezes, rales, or rhonchi. No accessory muscle use or respiratory distress.  Abdomen: soft, normoactive BS, non distended, nontender, no rebound, no guarding  Musculoskeletal:  Moving all extremities equally. No gross deformity. No tenderness to palpation.  Skin: No rashes or lesions. Warm.  Neurologic: No focal neurological deficits. CN II-XII grossly intact.  : no CVA tenderness  Psych: Appropriate mood and affect. Cooperative.

## 2023-02-14 NOTE — ED CDU PROVIDER DISPOSITION NOTE - CLINICAL COURSE
pt is a 66 y/o male with a pmhx of HTN, HLD, gerd, past hx of ILR implant that was removed, ablation presenting to the ed for palpitations, lab work, echo obtained, pt to continue with verapamil 240 mg daily follow up in the office for [facility] pt given discharge instructions

## 2023-04-10 ENCOUNTER — NON-APPOINTMENT (OUTPATIENT)
Age: 68
End: 2023-04-10

## 2023-09-19 NOTE — CONSULT NOTE ADULT - PROBLEM SELECTOR RECOMMENDATION 9
-now resolved  -pt received ASA in ED  -telemetry overnight  -cardiac markers x3  -EKG with chest pain  -outpatient stress test
No (0)

## 2024-01-11 ENCOUNTER — EMERGENCY (EMERGENCY)
Facility: HOSPITAL | Age: 69
LOS: 1 days | Discharge: HOME | End: 2024-01-11
Attending: EMERGENCY MEDICINE
Payer: COMMERCIAL

## 2024-01-11 VITALS
DIASTOLIC BLOOD PRESSURE: 77 MMHG | OXYGEN SATURATION: 99 % | SYSTOLIC BLOOD PRESSURE: 144 MMHG | RESPIRATION RATE: 18 BRPM | TEMPERATURE: 99 F | HEART RATE: 80 BPM

## 2024-01-11 VITALS
OXYGEN SATURATION: 99 % | TEMPERATURE: 99 F | RESPIRATION RATE: 18 BRPM | SYSTOLIC BLOOD PRESSURE: 192 MMHG | WEIGHT: 185.63 LBS | HEART RATE: 132 BPM | DIASTOLIC BLOOD PRESSURE: 91 MMHG

## 2024-01-11 DIAGNOSIS — Z98.890 OTHER SPECIFIED POSTPROCEDURAL STATES: Chronic | ICD-10-CM

## 2024-01-11 DIAGNOSIS — R07.9 CHEST PAIN, UNSPECIFIED: ICD-10-CM

## 2024-01-11 DIAGNOSIS — R00.2 PALPITATIONS: ICD-10-CM

## 2024-01-11 DIAGNOSIS — I10 ESSENTIAL (PRIMARY) HYPERTENSION: ICD-10-CM

## 2024-01-11 LAB
ALBUMIN SERPL ELPH-MCNC: 3.9 G/DL — SIGNIFICANT CHANGE UP (ref 3.3–5.2)
ALBUMIN SERPL ELPH-MCNC: 3.9 G/DL — SIGNIFICANT CHANGE UP (ref 3.3–5.2)
ALP SERPL-CCNC: 102 U/L — SIGNIFICANT CHANGE UP (ref 40–120)
ALP SERPL-CCNC: 102 U/L — SIGNIFICANT CHANGE UP (ref 40–120)
ALT FLD-CCNC: 21 U/L — SIGNIFICANT CHANGE UP
ALT FLD-CCNC: 21 U/L — SIGNIFICANT CHANGE UP
ANION GAP SERPL CALC-SCNC: 9 MMOL/L — SIGNIFICANT CHANGE UP (ref 5–17)
ANION GAP SERPL CALC-SCNC: 9 MMOL/L — SIGNIFICANT CHANGE UP (ref 5–17)
AST SERPL-CCNC: 21 U/L — SIGNIFICANT CHANGE UP
AST SERPL-CCNC: 21 U/L — SIGNIFICANT CHANGE UP
BASOPHILS # BLD AUTO: 0.02 K/UL — SIGNIFICANT CHANGE UP (ref 0–0.2)
BASOPHILS # BLD AUTO: 0.02 K/UL — SIGNIFICANT CHANGE UP (ref 0–0.2)
BASOPHILS NFR BLD AUTO: 0.3 % — SIGNIFICANT CHANGE UP (ref 0–2)
BASOPHILS NFR BLD AUTO: 0.3 % — SIGNIFICANT CHANGE UP (ref 0–2)
BILIRUB SERPL-MCNC: 0.4 MG/DL — SIGNIFICANT CHANGE UP (ref 0.4–2)
BILIRUB SERPL-MCNC: 0.4 MG/DL — SIGNIFICANT CHANGE UP (ref 0.4–2)
BUN SERPL-MCNC: 17.3 MG/DL — SIGNIFICANT CHANGE UP (ref 8–20)
BUN SERPL-MCNC: 17.3 MG/DL — SIGNIFICANT CHANGE UP (ref 8–20)
CALCIUM SERPL-MCNC: 9.2 MG/DL — SIGNIFICANT CHANGE UP (ref 8.4–10.5)
CALCIUM SERPL-MCNC: 9.2 MG/DL — SIGNIFICANT CHANGE UP (ref 8.4–10.5)
CHLORIDE SERPL-SCNC: 98 MMOL/L — SIGNIFICANT CHANGE UP (ref 96–108)
CHLORIDE SERPL-SCNC: 98 MMOL/L — SIGNIFICANT CHANGE UP (ref 96–108)
CO2 SERPL-SCNC: 29 MMOL/L — SIGNIFICANT CHANGE UP (ref 22–29)
CO2 SERPL-SCNC: 29 MMOL/L — SIGNIFICANT CHANGE UP (ref 22–29)
CREAT SERPL-MCNC: 1.25 MG/DL — SIGNIFICANT CHANGE UP (ref 0.5–1.3)
CREAT SERPL-MCNC: 1.25 MG/DL — SIGNIFICANT CHANGE UP (ref 0.5–1.3)
EGFR: 64 ML/MIN/1.73M2 — SIGNIFICANT CHANGE UP
EGFR: 64 ML/MIN/1.73M2 — SIGNIFICANT CHANGE UP
EOSINOPHIL # BLD AUTO: 0.02 K/UL — SIGNIFICANT CHANGE UP (ref 0–0.5)
EOSINOPHIL # BLD AUTO: 0.02 K/UL — SIGNIFICANT CHANGE UP (ref 0–0.5)
EOSINOPHIL NFR BLD AUTO: 0.3 % — SIGNIFICANT CHANGE UP (ref 0–6)
EOSINOPHIL NFR BLD AUTO: 0.3 % — SIGNIFICANT CHANGE UP (ref 0–6)
GLUCOSE SERPL-MCNC: 119 MG/DL — HIGH (ref 70–99)
GLUCOSE SERPL-MCNC: 119 MG/DL — HIGH (ref 70–99)
HCT VFR BLD CALC: 45.7 % — SIGNIFICANT CHANGE UP (ref 39–50)
HCT VFR BLD CALC: 45.7 % — SIGNIFICANT CHANGE UP (ref 39–50)
HGB BLD-MCNC: 15.1 G/DL — SIGNIFICANT CHANGE UP (ref 13–17)
HGB BLD-MCNC: 15.1 G/DL — SIGNIFICANT CHANGE UP (ref 13–17)
IMM GRANULOCYTES NFR BLD AUTO: 0.1 % — SIGNIFICANT CHANGE UP (ref 0–0.9)
IMM GRANULOCYTES NFR BLD AUTO: 0.1 % — SIGNIFICANT CHANGE UP (ref 0–0.9)
LYMPHOCYTES # BLD AUTO: 1.67 K/UL — SIGNIFICANT CHANGE UP (ref 1–3.3)
LYMPHOCYTES # BLD AUTO: 1.67 K/UL — SIGNIFICANT CHANGE UP (ref 1–3.3)
LYMPHOCYTES # BLD AUTO: 23.6 % — SIGNIFICANT CHANGE UP (ref 13–44)
LYMPHOCYTES # BLD AUTO: 23.6 % — SIGNIFICANT CHANGE UP (ref 13–44)
MAGNESIUM SERPL-MCNC: 1.7 MG/DL — SIGNIFICANT CHANGE UP (ref 1.6–2.6)
MAGNESIUM SERPL-MCNC: 1.7 MG/DL — SIGNIFICANT CHANGE UP (ref 1.6–2.6)
MCHC RBC-ENTMCNC: 28.8 PG — SIGNIFICANT CHANGE UP (ref 27–34)
MCHC RBC-ENTMCNC: 28.8 PG — SIGNIFICANT CHANGE UP (ref 27–34)
MCHC RBC-ENTMCNC: 33 GM/DL — SIGNIFICANT CHANGE UP (ref 32–36)
MCHC RBC-ENTMCNC: 33 GM/DL — SIGNIFICANT CHANGE UP (ref 32–36)
MCV RBC AUTO: 87 FL — SIGNIFICANT CHANGE UP (ref 80–100)
MCV RBC AUTO: 87 FL — SIGNIFICANT CHANGE UP (ref 80–100)
MONOCYTES # BLD AUTO: 0.5 K/UL — SIGNIFICANT CHANGE UP (ref 0–0.9)
MONOCYTES # BLD AUTO: 0.5 K/UL — SIGNIFICANT CHANGE UP (ref 0–0.9)
MONOCYTES NFR BLD AUTO: 7.1 % — SIGNIFICANT CHANGE UP (ref 2–14)
MONOCYTES NFR BLD AUTO: 7.1 % — SIGNIFICANT CHANGE UP (ref 2–14)
NEUTROPHILS # BLD AUTO: 4.86 K/UL — SIGNIFICANT CHANGE UP (ref 1.8–7.4)
NEUTROPHILS # BLD AUTO: 4.86 K/UL — SIGNIFICANT CHANGE UP (ref 1.8–7.4)
NEUTROPHILS NFR BLD AUTO: 68.6 % — SIGNIFICANT CHANGE UP (ref 43–77)
NEUTROPHILS NFR BLD AUTO: 68.6 % — SIGNIFICANT CHANGE UP (ref 43–77)
PLATELET # BLD AUTO: 209 K/UL — SIGNIFICANT CHANGE UP (ref 150–400)
PLATELET # BLD AUTO: 209 K/UL — SIGNIFICANT CHANGE UP (ref 150–400)
POTASSIUM SERPL-MCNC: 3.7 MMOL/L — SIGNIFICANT CHANGE UP (ref 3.5–5.3)
POTASSIUM SERPL-MCNC: 3.7 MMOL/L — SIGNIFICANT CHANGE UP (ref 3.5–5.3)
POTASSIUM SERPL-SCNC: 3.7 MMOL/L — SIGNIFICANT CHANGE UP (ref 3.5–5.3)
POTASSIUM SERPL-SCNC: 3.7 MMOL/L — SIGNIFICANT CHANGE UP (ref 3.5–5.3)
PROT SERPL-MCNC: 7 G/DL — SIGNIFICANT CHANGE UP (ref 6.6–8.7)
PROT SERPL-MCNC: 7 G/DL — SIGNIFICANT CHANGE UP (ref 6.6–8.7)
RBC # BLD: 5.25 M/UL — SIGNIFICANT CHANGE UP (ref 4.2–5.8)
RBC # BLD: 5.25 M/UL — SIGNIFICANT CHANGE UP (ref 4.2–5.8)
RBC # FLD: 12.7 % — SIGNIFICANT CHANGE UP (ref 10.3–14.5)
RBC # FLD: 12.7 % — SIGNIFICANT CHANGE UP (ref 10.3–14.5)
SODIUM SERPL-SCNC: 136 MMOL/L — SIGNIFICANT CHANGE UP (ref 135–145)
SODIUM SERPL-SCNC: 136 MMOL/L — SIGNIFICANT CHANGE UP (ref 135–145)
TROPONIN T, HIGH SENSITIVITY RESULT: 10 NG/L — SIGNIFICANT CHANGE UP (ref 0–51)
TROPONIN T, HIGH SENSITIVITY RESULT: 10 NG/L — SIGNIFICANT CHANGE UP (ref 0–51)
TROPONIN T, HIGH SENSITIVITY RESULT: 8 NG/L — SIGNIFICANT CHANGE UP (ref 0–51)
TSH SERPL-MCNC: 1.33 UIU/ML — SIGNIFICANT CHANGE UP (ref 0.27–4.2)
TSH SERPL-MCNC: 1.33 UIU/ML — SIGNIFICANT CHANGE UP (ref 0.27–4.2)
WBC # BLD: 7.08 K/UL — SIGNIFICANT CHANGE UP (ref 3.8–10.5)
WBC # BLD: 7.08 K/UL — SIGNIFICANT CHANGE UP (ref 3.8–10.5)
WBC # FLD AUTO: 7.08 K/UL — SIGNIFICANT CHANGE UP (ref 3.8–10.5)
WBC # FLD AUTO: 7.08 K/UL — SIGNIFICANT CHANGE UP (ref 3.8–10.5)

## 2024-01-11 PROCEDURE — 71045 X-RAY EXAM CHEST 1 VIEW: CPT

## 2024-01-11 PROCEDURE — 99285 EMERGENCY DEPT VISIT HI MDM: CPT | Mod: 25

## 2024-01-11 PROCEDURE — 78452 HT MUSCLE IMAGE SPECT MULT: CPT | Mod: QQ

## 2024-01-11 PROCEDURE — 85025 COMPLETE CBC W/AUTO DIFF WBC: CPT

## 2024-01-11 PROCEDURE — 93010 ELECTROCARDIOGRAM REPORT: CPT | Mod: 76

## 2024-01-11 PROCEDURE — 93018 CV STRESS TEST I&R ONLY: CPT

## 2024-01-11 PROCEDURE — G0378: CPT

## 2024-01-11 PROCEDURE — 99283 EMERGENCY DEPT VISIT LOW MDM: CPT

## 2024-01-11 PROCEDURE — 93308 TTE F-UP OR LMTD: CPT | Mod: 26

## 2024-01-11 PROCEDURE — 96374 THER/PROPH/DIAG INJ IV PUSH: CPT | Mod: XU

## 2024-01-11 PROCEDURE — 71045 X-RAY EXAM CHEST 1 VIEW: CPT | Mod: 26

## 2024-01-11 PROCEDURE — 93016 CV STRESS TEST SUPVJ ONLY: CPT

## 2024-01-11 PROCEDURE — 93321 DOPPLER ECHO F-UP/LMTD STD: CPT

## 2024-01-11 PROCEDURE — 36415 COLL VENOUS BLD VENIPUNCTURE: CPT

## 2024-01-11 PROCEDURE — 99223 1ST HOSP IP/OBS HIGH 75: CPT

## 2024-01-11 PROCEDURE — 78452 HT MUSCLE IMAGE SPECT MULT: CPT | Mod: 26,QQ

## 2024-01-11 PROCEDURE — 84443 ASSAY THYROID STIM HORMONE: CPT

## 2024-01-11 PROCEDURE — 93005 ELECTROCARDIOGRAM TRACING: CPT

## 2024-01-11 PROCEDURE — 93308 TTE F-UP OR LMTD: CPT

## 2024-01-11 PROCEDURE — A9500: CPT

## 2024-01-11 PROCEDURE — 80053 COMPREHEN METABOLIC PANEL: CPT

## 2024-01-11 PROCEDURE — 93017 CV STRESS TEST TRACING ONLY: CPT

## 2024-01-11 PROCEDURE — 83735 ASSAY OF MAGNESIUM: CPT

## 2024-01-11 PROCEDURE — 99222 1ST HOSP IP/OBS MODERATE 55: CPT

## 2024-01-11 PROCEDURE — 93321 DOPPLER ECHO F-UP/LMTD STD: CPT | Mod: 26

## 2024-01-11 PROCEDURE — 84484 ASSAY OF TROPONIN QUANT: CPT

## 2024-01-11 RX ORDER — DILTIAZEM HCL 120 MG
240 CAPSULE, EXT RELEASE 24 HR ORAL DAILY
Refills: 0 | Status: DISCONTINUED | OUTPATIENT
Start: 2024-01-11 | End: 2024-01-11

## 2024-01-11 RX ORDER — MAGNESIUM OXIDE 400 MG ORAL TABLET 241.3 MG
400 TABLET ORAL DAILY
Refills: 0 | Status: DISCONTINUED | OUTPATIENT
Start: 2024-01-11 | End: 2024-01-18

## 2024-01-11 RX ORDER — POTASSIUM CHLORIDE 20 MEQ
40 PACKET (EA) ORAL ONCE
Refills: 0 | Status: COMPLETED | OUTPATIENT
Start: 2024-01-11 | End: 2024-01-11

## 2024-01-11 RX ORDER — ATORVASTATIN CALCIUM 80 MG/1
10 TABLET, FILM COATED ORAL AT BEDTIME
Refills: 0 | Status: DISCONTINUED | OUTPATIENT
Start: 2024-01-11 | End: 2024-01-18

## 2024-01-11 RX ORDER — POTASSIUM CHLORIDE 20 MEQ
20 PACKET (EA) ORAL DAILY
Refills: 0 | Status: DISCONTINUED | OUTPATIENT
Start: 2024-01-12 | End: 2024-01-18

## 2024-01-11 RX ORDER — AMLODIPINE BESYLATE 2.5 MG/1
5 TABLET ORAL DAILY
Refills: 0 | Status: DISCONTINUED | OUTPATIENT
Start: 2024-01-11 | End: 2024-01-18

## 2024-01-11 RX ORDER — CARVEDILOL PHOSPHATE 80 MG/1
1 CAPSULE, EXTENDED RELEASE ORAL
Qty: 42 | Refills: 0
Start: 2024-01-11 | End: 2024-01-31

## 2024-01-11 RX ORDER — CARVEDILOL PHOSPHATE 80 MG/1
6.25 CAPSULE, EXTENDED RELEASE ORAL EVERY 12 HOURS
Refills: 0 | Status: DISCONTINUED | OUTPATIENT
Start: 2024-01-11 | End: 2024-01-18

## 2024-01-11 RX ORDER — AMLODIPINE BESYLATE 2.5 MG/1
1 TABLET ORAL
Qty: 21 | Refills: 0
Start: 2024-01-11 | End: 2024-01-31

## 2024-01-11 RX ORDER — SODIUM CHLORIDE 9 MG/ML
500 INJECTION INTRAMUSCULAR; INTRAVENOUS; SUBCUTANEOUS ONCE
Refills: 0 | Status: COMPLETED | OUTPATIENT
Start: 2024-01-11 | End: 2024-01-11

## 2024-01-11 RX ORDER — ASPIRIN/CALCIUM CARB/MAGNESIUM 324 MG
162 TABLET ORAL ONCE
Refills: 0 | Status: COMPLETED | OUTPATIENT
Start: 2024-01-11 | End: 2024-01-11

## 2024-01-11 RX ORDER — MAGNESIUM SULFATE 500 MG/ML
2 VIAL (ML) INJECTION ONCE
Refills: 0 | Status: COMPLETED | OUTPATIENT
Start: 2024-01-11 | End: 2024-01-11

## 2024-01-11 RX ADMIN — Medication 0.3 MILLIGRAM(S): at 09:39

## 2024-01-11 RX ADMIN — ATORVASTATIN CALCIUM 10 MILLIGRAM(S): 80 TABLET, FILM COATED ORAL at 09:38

## 2024-01-11 RX ADMIN — Medication 162 MILLIGRAM(S): at 06:28

## 2024-01-11 RX ADMIN — Medication 40 MILLIEQUIVALENT(S): at 09:38

## 2024-01-11 RX ADMIN — SODIUM CHLORIDE 500 MILLILITER(S): 9 INJECTION INTRAMUSCULAR; INTRAVENOUS; SUBCUTANEOUS at 14:35

## 2024-01-11 RX ADMIN — Medication 150 GRAM(S): at 09:39

## 2024-01-11 NOTE — CONSULT NOTE ADULT - SUBJECTIVE AND OBJECTIVE BOX
[facility]                                              CARDIOLOGY AT [place] [address]                                             Telephone: [phone]. Fax:[phone]                                                         CARDIOLOGY CONSULTATION NOTE                                                                                             Consult requested by:  Dr. Pool  History obtained by: Patient and medical record  Community Cardiologist: Romina richardson  and Selma   obtained: Yes [  ] No [ x ]  Reason for Consultation: Palpitations and chest pain  Avialable out pt records reviewed: Yes [  ] No [ x ]    This is a 69 y/o male with PMH HTN HLD Hyperthyroidism previously on methimazole and AVNRT ,s/p successful slow pathway modification with radiofrequency ablation) on 3/16/22 who presents with palpitations left sided chest pain and accelerated hypertension.  Patient reports coming to ER 2 days ago with palpitations and he came to ER but it crowed and he went home.  B/p was elevated and he took a clonidine and rested and he felt better.  Has been having Left sided chest pain which occurred while mopping the floor.  He has been taking naproxen and it has been getting better but he has been having anxiety over the chest pain with fear its his heart.  Today, had palpitations and he came to the ER.  Was in Sinus tach with frequent pacs. B/p was 192/91.  Left sided chest pain persists  Worsens with movement      CARDIAC TESTING   ECHO: 2021  ef 61% no significant valvular disease  STRESS: 2021  normal perfusion    PAST MEDICAL HISTORY  HLD  HLD  GERD   AVNRT  Hyperthyroidism      PAST SURGICAL HISTORY  AVNRT abaltion  Loop and extraction  History of arthroscopy of both shoulders    SOCIAL HISTORY:  Denies smoking/alcohol/drugs  REtired     FAMILY HX  no family hx of early heart disease  parents heart dx after 80    HOME MEDICATIONS:  Atorvastatin Calcium 10 MG Oral Tablet; Take 1 tablet daily  ?? Chlorthalidone 25 MG Oral Tablet; TAKE 1 TABLET DAILY  ?? cloNIDine HCl - 0.2 MG Oral Tablet; TAKE 1 TABLET TWICE DAILY  ?? Magnesium 400 MG CAPS; 1 tab once daily  ?? Potassium Chloride ER 10 MEQ Oral Capsule Extended Release; TAKE 1 CAPSULE  TWICE DAILY  ?? Verapamil HCl  MG Oral Tablet Extended Release; TAKE 1 TABLET DAILY        ALLERGIES: Allergies  lisinopril (Angioedema)    REVIEW OF SYMPTOMS:   CONSTITUTIONAL: No fever, no chills, no weight loss, no weight gain, no fatigue   ENMT:  No vertigo; No sinus or throat pain  NECK: No pain or stiffness  CARDIOVASCULAR: + chest pain, no dyspnea, no syncope/presyncope, ++ palpitations, no dizziness, no Orthopnea, no Paroxsymal nocturnal dyspnea  RESPIRATORY: no Shortness of breath, no cough, no wheezing  : No dysuria, no hematuria   GI: No dark color stool, no nausea, no diarrhea, no constipation, no abdominal pain   NEURO: No headache, no slurred speech   MUSCULOSKELETAL: No joint pain or swelling; No muscle, back, or extremity pain  PSYCH: No agitation, no anxiety.    ALL OTHER REVIEW OF SYSTEMS ARE NEGATIVE.      Vital Signs Last 24 Hrs  T(C): 37.1 (11 Jan 2024 07:30), Max: 37.2 (11 Jan 2024 04:39)  T(F): 98.7 (11 Jan 2024 07:30), Max: 99 (11 Jan 2024 04:39)  HR: 93 (11 Jan 2024 07:30) (93 - 132)  BP: 173/84 (11 Jan 2024 07:30) (173/84 - 192/91)  BP(mean): --  RR: 18 (11 Jan 2024 07:30) (18 - 18)  SpO2: 98% (11 Jan 2024 07:30) (98% - 99%)  Parameters below as of 11 Jan 2024 04:39  Patient On (Oxygen Delivery Method): room air    INTAKE AND OUTPUT:     PHYSICAL EXAM:  Constitutional: Comfortable . No acute distress.   HEENT: Atraumatic and normocephalic , neck is supple . no JVD. No carotid bruit.  CNS: A&Ox3. No focal deficits.   Respiratory: CTAB, unlabored   Cardiovascular: RRR normal s1 s2. No murmur. No rubs or gallop.  Gastrointestinal: Soft, non-tender. +Bowel sounds.   MSK: full ROM extremities x 4  Extremities: No edema. No cyanosis   Psychiatric: Calm . no agitation.   Skin: Warm and dry, no ulcers on extremities       LABS:                        15.1   7.08  )-----------( 209      ( 11 Jan 2024 05:55 )             45.7     01-11    136  |  98  |  17.3  ----------------------------<  119<H>  3.7   |  29.0  |  1.25    Ca    9.2      11 Jan 2024 05:55  Mg     1.7     01-11    TPro  7.0  /  Alb  3.9  /  TBili  0.4  /  DBili  x   /  AST  21  /  ALT  21  /  AlkPhos  102  01-11  Urinalysis Basic - ( 11 Jan 2024 05:55 )  olor: x / Appearance: x / SG: x / pH: x  Gluc: 119 mg/dL / Ketone: x  / Bili: x / Urobili: x   Blood: x / Protein: x / Nitrite: x   Leuk Esterase: x / RBC: x / WBC x   Sq Epi: x / Non Sq Epi: x / Bacteria: x    INTERPRETATION OF TELEMETRY: sinus with frequent pacs    ECG: Atopic atrial rhythm at 124 non specific st changes  Prior ECG: Yes [  ] No [  ]      RADIOLOGY & ADDITIONAL STUDIES:    X-ray:  reviewed by me.  official reading pending  no chf                                                   [facility]                                              CARDIOLOGY AT [place] [address]                                             Telephone: [phone]. Fax:[phone]                                                         CARDIOLOGY CONSULTATION NOTE                                                                                             Consult requested by:  Dr. Pool  History obtained by: Patient and medical record  Community Cardiologist: Romina richardson  and Selma   obtained: Yes [  ] No [ x ]  Reason for Consultation: Palpitations and chest pain  Avialable out pt records reviewed: Yes [  ] No [ x ]    This is a 69 y/o male with PMH HTN HLD Hyperthyroidism previously on methimazole and AVNRT ,s/p successful slow pathway modification with radiofrequency ablation) on 3/16/22 who presents with palpitations left sided chest pain and accelerated hypertension.  Patient reports coming to ER 2 days ago with palpitations and he came to ER but it crowed and he went home.  B/p was elevated and he took a clonidine and rested and he felt better.  Has been having Left sided chest pain which occurred while mopping the floor.  He has been taking naproxen and it has been getting better but he has been having anxiety over the chest pain with fear its his heart.  Today, had palpitations and he came to the ER.  Was in Sinus tach with frequent pacs. B/p was 192/91.  Left sided chest pain persists  Worsens with movement      CARDIAC TESTING   ECHO: 2021  ef 61% no significant valvular disease  STRESS: 2021  normal perfusion    PAST MEDICAL HISTORY  HLD  HLD  GERD   AVNRT  Hyperthyroidism      PAST SURGICAL HISTORY  AVNRT abaltion  Loop and extraction  History of arthroscopy of both shoulders    SOCIAL HISTORY:  Denies smoking/alcohol/drugs  REtired     FAMILY HX  no family hx of early heart disease  parents heart dx after 80    HOME MEDICATIONS:  Atorvastatin Calcium 10 MG Oral Tablet; Take 1 tablet daily  ?? Chlorthalidone 25 MG Oral Tablet; TAKE 1 TABLET DAILY  ?? cloNIDine HCl - 0.2 MG Oral Tablet; TAKE 1 TABLET TWICE DAILY  ?? Magnesium 400 MG CAPS; 1 tab once daily  ?? Potassium Chloride ER 10 MEQ Oral Capsule Extended Release; TAKE 1 CAPSULE  TWICE DAILY  ?? Verapamil HCl  MG Oral Tablet Extended Release; TAKE 1 TABLET DAILY        ALLERGIES: Allergies  lisinopril (Angioedema)    REVIEW OF SYMPTOMS:   CONSTITUTIONAL: No fever, no chills, no weight loss, no weight gain, no fatigue   ENMT:  No vertigo; No sinus or throat pain  NECK: No pain or stiffness  CARDIOVASCULAR: + chest pain, no dyspnea, no syncope/presyncope, ++ palpitations, no dizziness, no Orthopnea, no Paroxsymal nocturnal dyspnea  RESPIRATORY: no Shortness of breath, no cough, no wheezing  : No dysuria, no hematuria   GI: No dark color stool, no nausea, no diarrhea, no constipation, no abdominal pain   NEURO: No headache, no slurred speech   MUSCULOSKELETAL: No joint pain or swelling; No muscle, back, or extremity pain  PSYCH: No agitation, no anxiety.    ALL OTHER REVIEW OF SYSTEMS ARE NEGATIVE.      Vital Signs Last 24 Hrs  T(C): 37.1 (11 Jan 2024 07:30), Max: 37.2 (11 Jan 2024 04:39)  T(F): 98.7 (11 Jan 2024 07:30), Max: 99 (11 Jan 2024 04:39)  HR: 93 (11 Jan 2024 07:30) (93 - 132)  BP: 173/84 (11 Jan 2024 07:30) (173/84 - 192/91)  BP(mean): --  RR: 18 (11 Jan 2024 07:30) (18 - 18)  SpO2: 98% (11 Jan 2024 07:30) (98% - 99%)  Parameters below as of 11 Jan 2024 04:39  Patient On (Oxygen Delivery Method): room air    INTAKE AND OUTPUT:     PHYSICAL EXAM:  Constitutional: Comfortable . No acute distress.   HEENT: Atraumatic and normocephalic , neck is supple . no JVD. No carotid bruit.  CNS: A&Ox3. No focal deficits.   Respiratory: CTAB, unlabored   Cardiovascular: RRR normal s1 s2. No murmur. No rubs or gallop.  Gastrointestinal: Soft, non-tender. +Bowel sounds.   MSK: full ROM extremities x 4  Extremities: No edema. No cyanosis   Psychiatric: Calm . no agitation.   Skin: Warm and dry, no ulcers on extremities       LABS:                        15.1   7.08  )-----------( 209      ( 11 Jan 2024 05:55 )             45.7     01-11    136  |  98  |  17.3  ----------------------------<  119<H>  3.7   |  29.0  |  1.25    Ca    9.2      11 Jan 2024 05:55  Mg     1.7     01-11    TPro  7.0  /  Alb  3.9  /  TBili  0.4  /  DBili  x   /  AST  21  /  ALT  21  /  AlkPhos  102  01-11  Urinalysis Basic - ( 11 Jan 2024 05:55 )  olor: x / Appearance: x / SG: x / pH: x  Gluc: 119 mg/dL / Ketone: x  / Bili: x / Urobili: x   Blood: x / Protein: x / Nitrite: x   Leuk Esterase: x / RBC: x / WBC x   Sq Epi: x / Non Sq Epi: x / Bacteria: x    INTERPRETATION OF TELEMETRY: sinus with frequent pacs    ECG: Atopic atrial rhythm at 124 non specific st changes  Prior ECG: Yes [  ] No [  ]      RADIOLOGY & ADDITIONAL STUDIES:    X-ray:  reviewed by me.  official reading pending  no chf                                                   [facility]                                              CARDIOLOGY AT [place] [address]                                             Telephone: [phone]. Fax:[phone]                                                         CARDIOLOGY CONSULTATION NOTE                                                                                             Consult requested by:  Dr. Pool  History obtained by: Patient and medical record  Community Cardiologist: Romina richardson  and Selma   obtained: Yes [  ] No [ x ]  Reason for Consultation: Palpitations and chest pain  Avialable out pt records reviewed: Yes [  ] No [ x ]    This is a 69 y/o male with PMH HTN HLD Hyperthyroidism previously on methimazole and AVNRT ,s/p successful slow pathway modification with radiofrequency ablation) on 3/16/22 who presents with palpitations left sided chest pain and accelerated hypertension.  Patient reports coming to ER 2 days ago with palpitations and he came to ER but it crowded and he went home.  B/p was elevated and he took a clonidine and rested and he felt better.  Has been having Left sided chest pain which occurred while mopping the floor.  He has been taking naproxen and it has been getting better but he has been having anxiety over the chest pain with fear its his heart.  Today, had palpitations and he came to the ER.  Was in Sinus tach with frequent pacs. B/p was 192/91.  Left sided chest pain persists  Worsens with movement      CARDIAC TESTING   ECHO: 2021  ef 61% no significant valvular disease  STRESS: 2021  normal perfusion    PAST MEDICAL HISTORY  HLD  HLD  GERD   AVNRT  Hyperthyroidism      PAST SURGICAL HISTORY  AVNRT abaltion  Loop and extraction  History of arthroscopy of both shoulders    SOCIAL HISTORY:  Denies smoking/alcohol/drugs  REtired     FAMILY HX  no family hx of early heart disease  parents heart dx after 80    HOME MEDICATIONS:  Atorvastatin Calcium 10 MG Oral Tablet; Take 1 tablet daily  ?? Chlorthalidone 25 MG Oral Tablet; TAKE 1 TABLET DAILY  ?? cloNIDine HCl - 0.2 MG Oral Tablet; TAKE 1 TABLET TWICE DAILY  ?? Magnesium 400 MG CAPS; 1 tab once daily  ?? Potassium Chloride ER 10 MEQ Oral Capsule Extended Release; TAKE 1 CAPSULE  TWICE DAILY  ?? Verapamil HCl  MG Oral Tablet Extended Release; TAKE 1 TABLET DAILY        ALLERGIES: Allergies  lisinopril (Angioedema)    REVIEW OF SYMPTOMS:   CONSTITUTIONAL: No fever, no chills, no weight loss, no weight gain, no fatigue   ENMT:  No vertigo; No sinus or throat pain  NECK: No pain or stiffness  CARDIOVASCULAR: + chest pain, no dyspnea, no syncope/presyncope, ++ palpitations, no dizziness, no Orthopnea, no Paroxsymal nocturnal dyspnea  RESPIRATORY: no Shortness of breath, no cough, no wheezing  : No dysuria, no hematuria   GI: No dark color stool, no nausea, no diarrhea, no constipation, no abdominal pain   NEURO: No headache, no slurred speech   MUSCULOSKELETAL: No joint pain or swelling; No muscle, back, or extremity pain  PSYCH: No agitation, no anxiety.    ALL OTHER REVIEW OF SYSTEMS ARE NEGATIVE.      Vital Signs Last 24 Hrs  T(C): 37.1 (11 Jan 2024 07:30), Max: 37.2 (11 Jan 2024 04:39)  T(F): 98.7 (11 Jan 2024 07:30), Max: 99 (11 Jan 2024 04:39)  HR: 93 (11 Jan 2024 07:30) (93 - 132)  BP: 173/84 (11 Jan 2024 07:30) (173/84 - 192/91)  BP(mean): --  RR: 18 (11 Jan 2024 07:30) (18 - 18)  SpO2: 98% (11 Jan 2024 07:30) (98% - 99%)  Parameters below as of 11 Jan 2024 04:39  Patient On (Oxygen Delivery Method): room air    INTAKE AND OUTPUT:     PHYSICAL EXAM:  Constitutional: Comfortable . No acute distress.   HEENT: Atraumatic and normocephalic , neck is supple . no JVD. No carotid bruit.  CNS: A&Ox3. No focal deficits.   Respiratory: CTAB, unlabored   Cardiovascular: RRR normal s1 s2. No murmur. No rubs or gallop.  Gastrointestinal: Soft, non-tender. +Bowel sounds.   MSK: full ROM extremities x 4  Extremities: No edema. No cyanosis   Psychiatric: Calm . no agitation.   Skin: Warm and dry, no ulcers on extremities       LABS:                        15.1   7.08  )-----------( 209      ( 11 Jan 2024 05:55 )             45.7     01-11    136  |  98  |  17.3  ----------------------------<  119<H>  3.7   |  29.0  |  1.25    Ca    9.2      11 Jan 2024 05:55  Mg     1.7     01-11    TPro  7.0  /  Alb  3.9  /  TBili  0.4  /  DBili  x   /  AST  21  /  ALT  21  /  AlkPhos  102  01-11  Urinalysis Basic - ( 11 Jan 2024 05:55 )  olor: x / Appearance: x / SG: x / pH: x  Gluc: 119 mg/dL / Ketone: x  / Bili: x / Urobili: x   Blood: x / Protein: x / Nitrite: x   Leuk Esterase: x / RBC: x / WBC x   Sq Epi: x / Non Sq Epi: x / Bacteria: x    INTERPRETATION OF TELEMETRY: sinus with frequent pacs    ECG: Atopic atrial rhythm at 124 non specific st changes  Prior ECG: Yes [  ] No [  ]      RADIOLOGY & ADDITIONAL STUDIES:    X-ray:  reviewed by me.  official reading pending  no chf                                                   [facility]                                              CARDIOLOGY AT [place] [address]                                             Telephone: [phone]. Fax:[phone]                                                         CARDIOLOGY CONSULTATION NOTE                                                                                             Consult requested by:  Dr. Pool  History obtained by: Patient and medical record  Community Cardiologist: Romina richardson  and Selma   obtained: Yes [  ] No [ x ]  Reason for Consultation: Palpitations and chest pain  Avialable out pt records reviewed: Yes [  ] No [ x ]    This is a 69 y/o male with PMH HTN HLD Hyperthyroidism previously on methimazole and AVNRT ,s/p successful slow pathway modification with radiofrequency ablation) on 3/16/22 who presents with palpitations left sided chest pain and accelerated hypertension.  Patient reports coming to ER 2 days ago with palpitations and he came to ER but it crowded and he went home.  B/p was elevated and he took a clonidine and rested and he felt better.  Has been having Left sided chest pain which occurred while mopping the floor.  He has been taking naproxen and it has been getting better but he has been having anxiety over the chest pain with fear its his heart.  Today, had palpitations and he came to the ER.  Was in Sinus tach with frequent pacs. B/p was 192/91.  Left sided chest pain persists  Worsens with movement      CARDIAC TESTING   ECHO: 2021  ef 61% no significant valvular disease  STRESS: 2021  normal perfusion    PAST MEDICAL HISTORY  HLD  HLD  GERD   AVNRT  Hyperthyroidism      PAST SURGICAL HISTORY  AVNRT abaltion  Loop and extraction  History of arthroscopy of both shoulders    SOCIAL HISTORY:  Denies smoking/alcohol/drugs  REtired     FAMILY HX  no family hx of early heart disease  parents heart dx after 80    HOME MEDICATIONS:  Atorvastatin Calcium 10 MG Oral Tablet; Take 1 tablet daily  ?? Chlorthalidone 25 MG Oral Tablet; TAKE 1 TABLET DAILY  ?? cloNIDine HCl - 0.2 MG Oral Tablet; TAKE 1 TABLET TWICE DAILY  ?? Magnesium 400 MG CAPS; 1 tab once daily  ?? Potassium Chloride ER 10 MEQ Oral Capsule Extended Release; TAKE 1 CAPSULE  TWICE DAILY  ?? Verapamil HCl  MG Oral Tablet Extended Release; TAKE 1 TABLET DAILY        ALLERGIES: Allergies  lisinopril (Angioedema)    REVIEW OF SYMPTOMS:   CONSTITUTIONAL: No fever, no chills, no weight loss, no weight gain, no fatigue   ENMT:  No vertigo; No sinus or throat pain  NECK: No pain or stiffness  CARDIOVASCULAR: + chest pain, no dyspnea, no syncope/presyncope, ++ palpitations, no dizziness, no Orthopnea, no Paroxsymal nocturnal dyspnea  RESPIRATORY: no Shortness of breath, no cough, no wheezing  : No dysuria, no hematuria   GI: No dark color stool, no nausea, no diarrhea, no constipation, no abdominal pain   NEURO: No headache, no slurred speech   MUSCULOSKELETAL: No joint pain or swelling; No muscle, back, or extremity pain  PSYCH: No agitation, no anxiety.    ALL OTHER REVIEW OF SYSTEMS ARE NEGATIVE.      Vital Signs Last 24 Hrs  T(C): 37.1 (11 Jan 2024 07:30), Max: 37.2 (11 Jan 2024 04:39)  T(F): 98.7 (11 Jan 2024 07:30), Max: 99 (11 Jan 2024 04:39)  HR: 93 (11 Jan 2024 07:30) (93 - 132)  BP: 173/84 (11 Jan 2024 07:30) (173/84 - 192/91)  BP(mean): --  RR: 18 (11 Jan 2024 07:30) (18 - 18)  SpO2: 98% (11 Jan 2024 07:30) (98% - 99%)  Parameters below as of 11 Jan 2024 04:39  Patient On (Oxygen Delivery Method): room air    INTAKE AND OUTPUT:     PHYSICAL EXAM:  Constitutional: Comfortable . No acute distress.   HEENT: Atraumatic and normocephalic , neck is supple . no JVD. No carotid bruit.  CNS: A&Ox3. No focal deficits.   Respiratory: CTAB, unlabored   Cardiovascular: RRR normal s1 s2. No murmur. No rubs or gallop.  Gastrointestinal: Soft, non-tender. +Bowel sounds.   MSK: full ROM extremities x 4  Extremities: No edema. No cyanosis   Psychiatric: Calm . no agitation.   Skin: Warm and dry, no ulcers on extremities       LABS:                        15.1   7.08  )-----------( 209      ( 11 Jan 2024 05:55 )             45.7     01-11    136  |  98  |  17.3  ----------------------------<  119<H>  3.7   |  29.0  |  1.25    Ca    9.2      11 Jan 2024 05:55  Mg     1.7     01-11    TPro  7.0  /  Alb  3.9  /  TBili  0.4  /  DBili  x   /  AST  21  /  ALT  21  /  AlkPhos  102  01-11  Urinalysis Basic - ( 11 Jan 2024 05:55 )  olor: x / Appearance: x / SG: x / pH: x  Gluc: 119 mg/dL / Ketone: x  / Bili: x / Urobili: x   Blood: x / Protein: x / Nitrite: x   Leuk Esterase: x / RBC: x / WBC x   Sq Epi: x / Non Sq Epi: x / Bacteria: x    INTERPRETATION OF TELEMETRY: sinus with frequent pacs    ECG: Atopic atrial rhythm at 124 non specific st changes  Prior ECG: Yes [  ] No [  ]      RADIOLOGY & ADDITIONAL STUDIES:    X-ray:  reviewed by me.  official reading pending  no chf

## 2024-01-11 NOTE — ED ADULT NURSE REASSESSMENT NOTE - NS ED NURSE REASSESS COMMENT FT1
pt received from stress test in A8R. alert and oriented x4. remains on continuous cardiac monitoring nsr, ambulated to bathroom without difficulty. respirations equal/unlabored. denies chest pain, shortness of breath, weakness, fever, chills, abdominal pain. pending stress test results.

## 2024-01-11 NOTE — ED CDU PROVIDER INITIAL DAY NOTE - PHYSICAL EXAMINATION
General: well appearing, NAD  Head: NC, AT  EENT: no scleral icterus  Cardiac: RRR, no apparent murmurs, no lower extremity edema  Respiratory: CTABL, no respiratory distress   Abdomen: soft, ND, NT, nonperitonitic  MSK/Vascular: soft compartments, warm extremities  Neuro: grossly intact  Psych: calm, cooperative

## 2024-01-11 NOTE — ED CDU PROVIDER INITIAL DAY NOTE - OBJECTIVE STATEMENT
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 States that the chest pain started when he was mopping the floor at home. Trop negative x2 here. Patient was seen by Cardiology who recommended LISETH and NST. Patient otherwise comfortable and in no acute distress on re-evaluation and aware of plan. Placed in observation pending results of further cardiac testing. 69 y/o M with PMHx HTN, HLD, Hyperthyroidism, AVNRT s/p ablation on 3/16/22 who presents to the ED for palpitations and left sided chest pain for the past two days. States that the chest pain started when he was mopping the floor at home. Trop negative x2 here. Patient was seen by Cardiology who recommended LISETH and NST. Patient otherwise comfortable and in no acute distress on re-evaluation and aware of plan. Placed in observation pending results of further cardiac testing.

## 2024-01-11 NOTE — ED CDU PROVIDER DISPOSITION NOTE - CARE PROVIDER_API CALL
Koffi Dale  Cardiovascular Disease  [address] [place] [address]  Phone: [phone]  Fax: [phone]  Follow Up Time:    Koffi Dale  Cardiovascular Disease  [address] [place] [address]  Phone: [phone]  Fax: [phone]  Follow Up Time:

## 2024-01-11 NOTE — ED CDU PROVIDER DISPOSITION NOTE - NSFOLLOWUPINSTRUCTIONS_ED_ALL_ED_FT
Please discontinue use of Calandine and Verapamil. You will be started on two new medications, Coreg 12.5 mg every 12 hours and Norvasc 5 mg daily as per Cardiology recommendations. Please follow-up with your Cardiologist within the next week and return to the Emergency Department for any new or worsening symptoms.

## 2024-01-11 NOTE — ED PROVIDER NOTE - PHYSICAL EXAMINATION
Gen: Well appearing in NAD  Head: NC/AT  Neck: trachea midline  Resp:  No distress, CTAB  CV: tachycardic, regular rhythm,  Reproducible left chest wall tenderness palpation, worse with arm movement.  GI: soft, NTND  Ext: no deformities, no LE edema, calf ttp  Neuro:  A&O appears non focal  Skin:  Warm and dry as visualized  Psych:  Normal affect and mood

## 2024-01-11 NOTE — CONSULT NOTE ADULT - ASSESSMENT
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  Patient reports coming to ER 2 days ago with palpitations and he came to ER but it crowed and he went home.  B/p was elevated and he took a clonidine and rested and he felt better.  Has been having Left sided chest pain which occurred while mopping the floor.  He has been taking naproxen and it has been getting better but he has been having anxiety over the chest pain with fear its his heart.  Today, had palpitations and he came to the ER.  Was in Sinus tach with frequent pacs. B/p was 192/91.  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  Patient reports coming to ER 2 days ago with palpitations and he came to ER but it crowed and he went home.  B/p was elevated and he took a clonidine and rested and he felt better.  Has been having Left sided chest pain which occurred while mopping the floor.  He has been taking naproxen and it has been getting better but he has been having anxiety over the chest pain with fear its his heart.  Today, had palpitations and he came to the ER.  Was in Sinus tach with frequent pacs. B/p was 192/91.  Left sided chest pain persists  Worsens with movement  ekg tachycardia with non specific changes Trop 10  tsh normal K and mg low normal

## 2024-01-11 NOTE — CONSULT NOTE ADULT - PROBLEM SELECTOR RECOMMENDATION 2
with atypical features  will get hood and decide if ischemic work up is needed  Trop negative with atypical features  will get hood and decide if ischemic work up is needed  Trop negative  will order nst

## 2024-01-11 NOTE — ED CDU PROVIDER DISPOSITION NOTE - CLINICAL COURSE
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 States that the chest pain started when he was mopping the floor at home. Trop negative x2 here. Patient was seen by Cardiology who recommended LISETH and NST which did not show any evidence of ischemia or significant abnormalities. Cardiology and EP both clearing patient for discharge. Cardiology recommending stopping patients Verapamil and Clonidine and started on Coreg and Norvasc. Scripts for new meds were sent to patient's pharmacy. Patient informed of results and follow-up plan and is agreeable to be discharged. Return precautions provided.

## 2024-01-11 NOTE — ED PROVIDER NOTE - PROGRESS NOTE DETAILS
eugene:  labs and imaging reviewed. pt signed out to me by dr. guillaume pending rpt trop and cards consult. delta trop 2.  spoke with cards RADHAMES Edmond who recommended obs for TTE and NST.  case d/w Dr. morales, obs attending, to put patient into obs for further testing.

## 2024-01-11 NOTE — ED ADULT TRIAGE NOTE - CHIEF COMPLAINT QUOTE
pt c/o left sided CP no radiation, palpitations, and hypertension onset yesterday.  Currently pain 7/10 describes as sharp.  Hx of SVT, HTN, HLD.  EKG in triage.

## 2024-01-11 NOTE — ED CDU PROVIDER INITIAL DAY NOTE - ATTENDING CONTRIBUTION TO CARE
68 M history of SVT, hypertension, on clonidine presenting with intermittent palpitations over the past few days, no chest pain or exertional symptoms.  Exam well-appearing, normal sinus rhythm on monitor, extremities warm well-perfused, lungs clear, no calf tenderness.  Plan for admit to observation for cardiology eval including EP consult, stress test and echo as well as medication reconciliation.

## 2024-01-11 NOTE — CONSULT NOTE ADULT - SUBJECTIVE AND OBJECTIVE BOX
[place] [facility]  [facility]   Office: [address] [address]  Telephone: [phone] Fax:[phone]      HPI:    Pt is a 67 y/o male with PMHx significant for HTN, hyperlipidemia, GERD, formerly hyperthyroidism treated with methimazole now euthyroid, palpitations, and long RP SVT s/p AVNRT ablation with Dr. Thakkar on 3/16/22, who presented to [facility] early this morning with complaints of intermittent palpitations and chest tightness for 1 week. Pt states he was mopping the floor last week when he developed a tightness over the L side of his chest. Pt states the pain is exacerbated with movement and reports some relief with NSAIDs. Pt reports around the same time he started feeling more frequent palpitations lasting several seconds in duration which were self resolving. EP team has been consulted for the management of palpitations due to his Hx of AVNRT ablation.    Pt reports intermittent palpitations for the past several months and was recently evaluated by Dr. Sosa ([facility]) who prescribed an MCOT monitor from Nov 14 - Dec 5th. The monitor revealed primarily sinus rhythm with intact conduction, avg HR 65 bpm, low of 45 bpm, and high of 140bpm. Pt also noted with a 5.8% PVC burden and 2.8% APC burden. No sustained arrythmias were appreciated. Pt was advised to c/w his Verapamil 240mg and he was planning to follow up with Dr. Thakkar. Pt reports no recent or remote syncopal events. Currently denies fever, chills, SOB, dizziness.    EKG: SR with intact conduction. APCs.     Telemetry: SR with intact conduction. Frequent APCs    Cardiology Summary:   NST 6/28/21: Normal study; no evidence for myocardial infarction or ischemia.  TTE: 6/4/21: LVEF 60-65%          PAST MEDICAL & SURGICAL HISTORY:  GERD (gastroesophageal reflux disease)      High cholesterol      Palpitations      Hypertension      H/O supraventricular tachycardia      Hypothyroid  resolved 2019- No longer on medications      At risk for sleep apnea  Bang score 4      History of arthroscopy of both shoulders          REVIEW OF SYSTEMS:    CONSTITUTIONAL: No fever, weight loss, or fatigue  NECK: No pain or stiffness  RESPIRATORY: No cough, wheezing, chills or hemoptysis; No shortness of breath  CARDIOVASCULAR: see HPI  GASTROINTESTINAL: No abdominal or epigastric pain. No nausea, vomiting, or hematemesis; No diarrhea or constipation. No melena or hematochezia.  NEUROLOGICAL: No headaches, memory loss, loss of strength, numbness, or tremors  SKIN: No itching, burning, rashes, or lesions   HEME/LYMPH: No easy bruising, or bleeding gums      MEDICATIONS  (STANDING):  amLODIPine   Tablet 5 milliGRAM(s) Oral daily  atorvastatin 10 milliGRAM(s) Oral at bedtime  carvedilol 6.25 milliGRAM(s) Oral every 12 hours  hydrochlorothiazide 25 milliGRAM(s) Oral daily  magnesium oxide 400 milliGRAM(s) Oral daily    MEDICATIONS  (PRN):      Allergies    lisinopril (Angioedema)    Intolerances        SOCIAL HISTORY:    Denies ETOH / Smoking / illicit drugs    FAMILY HISTORY:  Family history of coronary artery disease (Father, Mother)        Vital Signs Last 24 Hrs  T(C): 37.1 (11 Jan 2024 07:30), Max: 37.2 (11 Jan 2024 04:39)  T(F): 98.7 (11 Jan 2024 07:30), Max: 99 (11 Jan 2024 04:39)  HR: 93 (11 Jan 2024 07:30) (93 - 132)  BP: 173/84 (11 Jan 2024 07:30) (173/84 - 192/91)  BP(mean): --  RR: 18 (11 Jan 2024 07:30) (18 - 18)  SpO2: 98% (11 Jan 2024 07:30) (98% - 99%)    Parameters below as of 11 Jan 2024 04:39  Patient On (Oxygen Delivery Method): room air        Physical Exam:    Constitutional: Well-developed, well nourished, in no acute distress  Neck: supple, full range of motion, nontender to palpation midline  Chest wall: normal in appearance, nontender to palpation  Resp: effort normal, breath sounds clear to auscultation bilaterally  Cardiac: Heart regular rate and rhythm, no murmurs, rubs, or gallops.  No edema.  Abdomen: soft, nondistended, bowel sounds active, nontender to palpation in all four quadrants    Musculoskeletal: full range of motion all extremities with no pain, tenderness, swelling, or erythema    Neuro: Alert and oriented x 3, motor & sensation grossly in tact  Skin: color normal, no rashes or injury  Psych: mood calm       LABS:                        15.1   7.08  )-----------( 209      ( 11 Jan 2024 05:55 )             45.7   01-11    136  |  98  |  17.3  ----------------------------<  119<H>  3.7   |  29.0  |  1.25    Ca    9.2      11 Jan 2024 05:55  Mg     1.7     01-11    TPro  7.0  /  Alb  3.9  /  TBili  0.4  /  DBili  x   /  AST  21  /  ALT  21  /  AlkPhos  102  01-11  LIVER FUNCTIONS - ( 11 Jan 2024 05:55 )  Alb: 3.9 g/dL / Pro: 7.0 g/dL / ALK PHOS: 102 U/L / ALT: 21 U/L / AST: 21 U/L / GGT: x               Urinalysis Basic - ( 11 Jan 2024 05:55 )    Color: x / Appearance: x / SG: x / pH: x  Gluc: 119 mg/dL / Ketone: x  / Bili: x / Urobili: x   Blood: x / Protein: x / Nitrite: x   Leuk Esterase: x / RBC: x / WBC x   Sq Epi: x / Non Sq Epi: x / Bacteria: x        RADIOLOGY & ADDITIONAL STUDIES:      < from: Xray Chest 1 View- PORTABLE-Urgent (01.11.24 @ 06:49) >  IMPRESSION:   No evidence of active chest disease.    < end of copied text >        Assessment:    Pt is a 67 y/o male with PMHx significant for HTN, hyperlipidemia, GERD, formerly hyperthyroidism treated with methimazole now euthyroid, palpitations, and long RP SVT s/p AVNRT ablation with Dr. Thakkar on 3/16/22, who presented to [facility] early this morning with complaints of intermittent palpitations and chest tightness for 1 week. Pt states he was mopping the floor last week when he developed a tightness over the L side of his chest. Pt states the pain is exacerbated with movement and reports some relief with NSAIDs. Pt reports around the same time he started feeling more frequent palpitations lasting several seconds in duration which were self resolving. EP team has been consulted for the management of palpitations due to his Hx of AVNRT ablation.    Pt reports intermittent palpitations for the past several months and was recently evaluated by Dr. Sosa ([facility]) who prescribed an MCOT monitor from Nov 14 - Dec 5th. The monitor revealed primarily sinus rhythm with intact conduction, avg HR 65 bpm, low of 45 bpm, and high of 140bpm. Pt also noted with a 5.8% PVC burden and 2.8% APC burden. No sustained arrythmias were appreciated. Pt was advised to c/w his Verapamil 240mg and he was planning to follow up with Dr. Thakkar. Pt reports no recent or remote syncopal events. Currently denies fever, chills, SOB, dizziness.      incomplete note [place] [facility]  [facility]   Office: [address] [address]  Telephone: [phone] Fax:[phone]      HPI:    Pt is a 69 y/o male with PMHx significant for HTN, hyperlipidemia, GERD, formerly hyperthyroidism treated with methimazole now euthyroid, palpitations, and long RP SVT s/p AVNRT ablation with Dr. Thakkar on 3/16/22, who presented to [facility] early this morning with complaints of intermittent palpitations and chest tightness for 1 week. Pt states he was mopping the floor last week when he developed a tightness over the L side of his chest. Pt states the pain is exacerbated with movement and reports some relief with NSAIDs. Pt reports around the same time he started feeling more frequent palpitations lasting several seconds in duration which were self resolving. EP team has been consulted for the management of palpitations due to his Hx of AVNRT ablation.    Pt reports intermittent palpitations for the past several months and was recently evaluated by Dr. Sosa ([facility]) who prescribed an MCOT monitor from Nov 14 - Dec 5th. The monitor revealed primarily sinus rhythm with intact conduction, avg HR 65 bpm, low of 45 bpm, and high of 140bpm. Pt also noted with a 5.8% PVC burden and 2.8% APC burden. No sustained arrythmias were appreciated. Pt was advised to c/w his Verapamil 240mg and he was planning to follow up with Dr. Thakkar. Pt reports no recent or remote syncopal events. Currently denies fever, chills, SOB, dizziness.    EKG: SR with intact conduction. APCs.     Telemetry: SR with intact conduction. Frequent APCs    Cardiology Summary:   NST 6/28/21: Normal study; no evidence for myocardial infarction or ischemia.  TTE: 6/4/21: LVEF 60-65%          PAST MEDICAL & SURGICAL HISTORY:  GERD (gastroesophageal reflux disease)      High cholesterol      Palpitations      Hypertension      H/O supraventricular tachycardia      Hypothyroid  resolved 2019- No longer on medications      At risk for sleep apnea  Bang score 4      History of arthroscopy of both shoulders          REVIEW OF SYSTEMS:    CONSTITUTIONAL: No fever, weight loss, or fatigue  NECK: No pain or stiffness  RESPIRATORY: No cough, wheezing, chills or hemoptysis; No shortness of breath  CARDIOVASCULAR: see HPI  GASTROINTESTINAL: No abdominal or epigastric pain. No nausea, vomiting, or hematemesis; No diarrhea or constipation. No melena or hematochezia.  NEUROLOGICAL: No headaches, memory loss, loss of strength, numbness, or tremors  SKIN: No itching, burning, rashes, or lesions   HEME/LYMPH: No easy bruising, or bleeding gums      MEDICATIONS  (STANDING):  amLODIPine   Tablet 5 milliGRAM(s) Oral daily  atorvastatin 10 milliGRAM(s) Oral at bedtime  carvedilol 6.25 milliGRAM(s) Oral every 12 hours  hydrochlorothiazide 25 milliGRAM(s) Oral daily  magnesium oxide 400 milliGRAM(s) Oral daily    MEDICATIONS  (PRN):      Allergies    lisinopril (Angioedema)    Intolerances        SOCIAL HISTORY:    Denies ETOH / Smoking / illicit drugs    FAMILY HISTORY:  Family history of coronary artery disease (Father, Mother)        Vital Signs Last 24 Hrs  T(C): 37.1 (11 Jan 2024 07:30), Max: 37.2 (11 Jan 2024 04:39)  T(F): 98.7 (11 Jan 2024 07:30), Max: 99 (11 Jan 2024 04:39)  HR: 93 (11 Jan 2024 07:30) (93 - 132)  BP: 173/84 (11 Jan 2024 07:30) (173/84 - 192/91)  BP(mean): --  RR: 18 (11 Jan 2024 07:30) (18 - 18)  SpO2: 98% (11 Jan 2024 07:30) (98% - 99%)    Parameters below as of 11 Jan 2024 04:39  Patient On (Oxygen Delivery Method): room air        Physical Exam:    Constitutional: Well-developed, well nourished, in no acute distress  Neck: supple, full range of motion, nontender to palpation midline  Chest wall: normal in appearance, nontender to palpation  Resp: effort normal, breath sounds clear to auscultation bilaterally  Cardiac: Heart regular rate and rhythm, no murmurs, rubs, or gallops.  No edema.  Abdomen: soft, nondistended, bowel sounds active, nontender to palpation in all four quadrants    Musculoskeletal: full range of motion all extremities with no pain, tenderness, swelling, or erythema    Neuro: Alert and oriented x 3, motor & sensation grossly in tact  Skin: color normal, no rashes or injury  Psych: mood calm       LABS:                        15.1   7.08  )-----------( 209      ( 11 Jan 2024 05:55 )             45.7   01-11    136  |  98  |  17.3  ----------------------------<  119<H>  3.7   |  29.0  |  1.25    Ca    9.2      11 Jan 2024 05:55  Mg     1.7     01-11    TPro  7.0  /  Alb  3.9  /  TBili  0.4  /  DBili  x   /  AST  21  /  ALT  21  /  AlkPhos  102  01-11  LIVER FUNCTIONS - ( 11 Jan 2024 05:55 )  Alb: 3.9 g/dL / Pro: 7.0 g/dL / ALK PHOS: 102 U/L / ALT: 21 U/L / AST: 21 U/L / GGT: x               Urinalysis Basic - ( 11 Jan 2024 05:55 )    Color: x / Appearance: x / SG: x / pH: x  Gluc: 119 mg/dL / Ketone: x  / Bili: x / Urobili: x   Blood: x / Protein: x / Nitrite: x   Leuk Esterase: x / RBC: x / WBC x   Sq Epi: x / Non Sq Epi: x / Bacteria: x        RADIOLOGY & ADDITIONAL STUDIES:      < from: Xray Chest 1 View- PORTABLE-Urgent (01.11.24 @ 06:49) >  IMPRESSION:   No evidence of active chest disease.    < end of copied text >        Assessment:    Pt is a 69 y/o male with PMHx significant for HTN, hyperlipidemia, GERD, formerly hyperthyroidism treated with methimazole now euthyroid, palpitations, and long RP SVT s/p AVNRT ablation with Dr. Thakkar on 3/16/22, who presented to [facility] early this morning with complaints of intermittent palpitations and chest tightness for 1 week. Pt states he was mopping the floor last week when he developed a tightness over the L side of his chest. Pt states the pain is exacerbated with movement and reports some relief with NSAIDs. Pt reports around the same time he started feeling more frequent palpitations lasting several seconds in duration which were self resolving. EP team has been consulted for the management of palpitations due to his Hx of AVNRT ablation.    Pt reports intermittent palpitations for the past several months and was recently evaluated by Dr. Sosa ([facility]) who prescribed an MCOT monitor from Nov 14 - Dec 5th. The monitor revealed primarily sinus rhythm with intact conduction, avg HR 65 bpm, low of 45 bpm, and high of 140bpm. Pt also noted with a 5.8% PVC burden and 2.8% APC burden. No sustained arrythmias were appreciated. Pt was advised to c/w his Verapamil 240mg and he was planning to follow up with Dr. Thakkar. Pt reports no recent or remote syncopal events. Currently denies fever, chills, SOB, dizziness.      incomplete note [place] [facility]  [facility]   Office: [address] [place] [address]  Telephone: [phone] Fax:[phone]      HPI:    Pt is a 67 y/o male with PMHx significant for HTN, hyperlipidemia, GERD, formerly hyperthyroidism treated with methimazole now euthyroid, palpitations, and long RP SVT s/p AVNRT ablation with Dr. Thakkar on 3/16/22, who presented to [facility] early this morning with complaints of intermittent palpitations and chest tightness for 1 week. Pt states he was mopping the floor last week when he developed a tightness over the L side of his chest. Pt states the pain is exacerbated with movement and reports some relief with NSAIDs. Pt reports around the same time he started feeling more frequent palpitations lasting several seconds in duration which were self resolving. EP team has been consulted for the management of palpitations due to his Hx of AVNRT ablation.    Pt reports intermittent palpitations for the past several months and was recently evaluated by Dr. Sosa ([facility]) who prescribed an MCOT monitor from Nov 14 - Dec 5th. The monitor revealed primarily sinus rhythm with intact conduction, avg HR 65 bpm, low of 45 bpm, and high of 140bpm. Pt also noted with a 5.8% PVC burden and 2.8% APC burden. No sustained arrhythmias were appreciated. Pt was advised to c/w his Verapamil 240mg and he was planning to follow up with Dr. Thakkar. Pt reports no recent or remote syncopal events. Currently denies fever, chills, SOB, dizziness.    EKG:    Telemetry:     Cardiology Summary:   NST 6/28/21: Normal study; no evidence for myocardial infarction or ischemia.  TTE: 6/4/21: LVEF 60-65%          PAST MEDICAL & SURGICAL HISTORY:  GERD (gastroesophageal reflux disease)      High cholesterol      Palpitations      Hypertension      H/O supraventricular tachycardia      Hypothyroid  resolved 2019- No longer on medications      At risk for sleep apnea  Bang score 4      History of arthroscopy of both shoulders          REVIEW OF SYSTEMS:    CONSTITUTIONAL: No fever, weight loss, or fatigue  NECK: No pain or stiffness  RESPIRATORY: No cough, wheezing, chills or hemoptysis; No shortness of breath  CARDIOVASCULAR: see HPI  GASTROINTESTINAL: No abdominal or epigastric pain. No nausea, vomiting, or hematemesis; No diarrhea or constipation. No melena or hematochezia.  NEUROLOGICAL: No headaches, memory loss, loss of strength, numbness, or tremors  SKIN: No itching, burning, rashes, or lesions   HEME/LYMPH: No easy bruising, or bleeding gums      MEDICATIONS  (STANDING):  amLODIPine   Tablet 5 milliGRAM(s) Oral daily  atorvastatin 10 milliGRAM(s) Oral at bedtime  carvedilol 6.25 milliGRAM(s) Oral every 12 hours  hydrochlorothiazide 25 milliGRAM(s) Oral daily  magnesium oxide 400 milliGRAM(s) Oral daily    MEDICATIONS  (PRN):      Allergies    lisinopril (Angioedema)    Intolerances        SOCIAL HISTORY:    Denies ETOH / Smoking / illicit drugs    FAMILY HISTORY:  Family history of coronary artery disease (Father, Mother)        Vital Signs Last 24 Hrs  T(C): 37.1 (11 Jan 2024 07:30), Max: 37.2 (11 Jan 2024 04:39)  T(F): 98.7 (11 Jan 2024 07:30), Max: 99 (11 Jan 2024 04:39)  HR: 93 (11 Jan 2024 07:30) (93 - 132)  BP: 173/84 (11 Jan 2024 07:30) (173/84 - 192/91)  BP(mean): --  RR: 18 (11 Jan 2024 07:30) (18 - 18)  SpO2: 98% (11 Jan 2024 07:30) (98% - 99%)    Parameters below as of 11 Jan 2024 04:39  Patient On (Oxygen Delivery Method): room air        Physical Exam:    Constitutional: Well-developed, well nourished, in no acute distress  Neck: supple, full range of motion, nontender to palpation midline  Chest wall: normal in appearance, nontender to palpation  Resp: effort normal, breath sounds clear to auscultation bilaterally  Cardiac: Heart regular rate and rhythm, no murmurs, rubs, or gallops.  No edema.  Abdomen: soft, nondistended, bowel sounds active, nontender to palpation in all four quadrants    Musculoskeletal: full range of motion all extremities with no pain, tenderness, swelling, or erythema    Neuro: Alert and oriented x 3, motor & sensation grossly in tact  Skin: color normal, no rashes or injury  Psych: mood calm       LABS:                        15.1   7.08  )-----------( 209      ( 11 Jan 2024 05:55 )             45.7   01-11    136  |  98  |  17.3  ----------------------------<  119<H>  3.7   |  29.0  |  1.25    Ca    9.2      11 Jan 2024 05:55  Mg     1.7     01-11    TPro  7.0  /  Alb  3.9  /  TBili  0.4  /  DBili  x   /  AST  21  /  ALT  21  /  AlkPhos  102  01-11  LIVER FUNCTIONS - ( 11 Jan 2024 05:55 )  Alb: 3.9 g/dL / Pro: 7.0 g/dL / ALK PHOS: 102 U/L / ALT: 21 U/L / AST: 21 U/L / GGT: x               Urinalysis Basic - ( 11 Jan 2024 05:55 )    Color: x / Appearance: x / SG: x / pH: x  Gluc: 119 mg/dL / Ketone: x  / Bili: x / Urobili: x   Blood: x / Protein: x / Nitrite: x   Leuk Esterase: x / RBC: x / WBC x   Sq Epi: x / Non Sq Epi: x / Bacteria: x        RADIOLOGY & ADDITIONAL STUDIES:      < from: Xray Chest 1 View- PORTABLE-Urgent (01.11.24 @ 06:49) >  IMPRESSION:   No evidence of active chest disease.    < end of copied text >        Assessment:    Pt is a 67 y/o male with PMHx significant for HTN, hyperlipidemia, GERD, formerly hyperthyroidism treated with methimazole now euthyroid, palpitations, and long RP SVT s/p AVNRT ablation with Dr. Thakkar on 3/16/22, who presented to [facility] early this morning with complaints of intermittent palpitations and chest tightness for 1 week. Pt states he was mopping the floor last week when he developed a tightness over the L side of his chest. Pt states the pain is exacerbated with movement and reports some relief with NSAIDs. Pt reports around the same time he started feeling more frequent palpitations lasting several seconds in duration which were self resolving. EP team has been consulted for the management of palpitations due to his Hx of AVNRT ablation.    Pt reports intermittent palpitations for the past several months and was recently evaluated by Dr. Sosa ([facility]) who prescribed an MCOT monitor from Nov 14 - Dec 5th. The monitor revealed primarily sinus rhythm with intact conduction, avg HR 65 bpm, low of 45 bpm, and high of 140bpm. Pt also noted with a 5.8% PVC burden and 2.8% APC burden. No sustained arrythmias were appreciated. Pt was advised to c/w his Verapamil 240mg and he was planning to follow up with Dr. Thakkar. Pt reports no recent or remote syncopal events. Currently denies fever, chills, SOB, dizziness.      incomplete note [place] [facility]  [facility]   Office: [address] [place] [address]  Telephone: [phone] Fax:[phone]      HPI:    Pt is a 69 y/o male with PMHx significant for HTN, hyperlipidemia, GERD, formerly hyperthyroidism treated with methimazole now euthyroid, palpitations, and long RP SVT s/p AVNRT ablation with Dr. Thakkar on 3/16/22, who presented to [facility] early this morning with complaints of intermittent palpitations and chest tightness for 1 week. Pt states he was mopping the floor last week when he developed a tightness over the L side of his chest. Pt states the pain is exacerbated with movement and reports some relief with NSAIDs. Pt reports around the same time he started feeling more frequent palpitations lasting several seconds in duration which were self resolving. EP team has been consulted for the management of palpitations due to his Hx of AVNRT ablation.    Pt reports intermittent palpitations for the past several months and was recently evaluated by Dr. Sosa ([facility]) who prescribed an MCOT monitor from Nov 14 - Dec 5th. The monitor revealed primarily sinus rhythm with intact conduction, avg HR 65 bpm, low of 45 bpm, and high of 140bpm. Pt also noted with a 5.8% PVC burden and 2.8% APC burden. No sustained arrhythmias were appreciated. Pt was advised to c/w his Verapamil 240mg and he was planning to follow up with Dr. Thakkar. Pt reports no recent or remote syncopal events. Currently denies fever, chills, SOB, dizziness.    EKG:    Telemetry:     Cardiology Summary:   NST 6/28/21: Normal study; no evidence for myocardial infarction or ischemia.  TTE: 6/4/21: LVEF 60-65%          PAST MEDICAL & SURGICAL HISTORY:  GERD (gastroesophageal reflux disease)      High cholesterol      Palpitations      Hypertension      H/O supraventricular tachycardia      Hypothyroid  resolved 2019- No longer on medications      At risk for sleep apnea  Bang score 4      History of arthroscopy of both shoulders          REVIEW OF SYSTEMS:    CONSTITUTIONAL: No fever, weight loss, or fatigue  NECK: No pain or stiffness  RESPIRATORY: No cough, wheezing, chills or hemoptysis; No shortness of breath  CARDIOVASCULAR: see HPI  GASTROINTESTINAL: No abdominal or epigastric pain. No nausea, vomiting, or hematemesis; No diarrhea or constipation. No melena or hematochezia.  NEUROLOGICAL: No headaches, memory loss, loss of strength, numbness, or tremors  SKIN: No itching, burning, rashes, or lesions   HEME/LYMPH: No easy bruising, or bleeding gums      MEDICATIONS  (STANDING):  amLODIPine   Tablet 5 milliGRAM(s) Oral daily  atorvastatin 10 milliGRAM(s) Oral at bedtime  carvedilol 6.25 milliGRAM(s) Oral every 12 hours  hydrochlorothiazide 25 milliGRAM(s) Oral daily  magnesium oxide 400 milliGRAM(s) Oral daily    MEDICATIONS  (PRN):      Allergies    lisinopril (Angioedema)    Intolerances        SOCIAL HISTORY:    Denies ETOH / Smoking / illicit drugs    FAMILY HISTORY:  Family history of coronary artery disease (Father, Mother)        Vital Signs Last 24 Hrs  T(C): 37.1 (11 Jan 2024 07:30), Max: 37.2 (11 Jan 2024 04:39)  T(F): 98.7 (11 Jan 2024 07:30), Max: 99 (11 Jan 2024 04:39)  HR: 93 (11 Jan 2024 07:30) (93 - 132)  BP: 173/84 (11 Jan 2024 07:30) (173/84 - 192/91)  BP(mean): --  RR: 18 (11 Jan 2024 07:30) (18 - 18)  SpO2: 98% (11 Jan 2024 07:30) (98% - 99%)    Parameters below as of 11 Jan 2024 04:39  Patient On (Oxygen Delivery Method): room air        Physical Exam:    Constitutional: Well-developed, well nourished, in no acute distress  Neck: supple, full range of motion, nontender to palpation midline  Chest wall: normal in appearance, nontender to palpation  Resp: effort normal, breath sounds clear to auscultation bilaterally  Cardiac: Heart regular rate and rhythm, no murmurs, rubs, or gallops.  No edema.  Abdomen: soft, nondistended, bowel sounds active, nontender to palpation in all four quadrants    Musculoskeletal: full range of motion all extremities with no pain, tenderness, swelling, or erythema    Neuro: Alert and oriented x 3, motor & sensation grossly in tact  Skin: color normal, no rashes or injury  Psych: mood calm       LABS:                        15.1   7.08  )-----------( 209      ( 11 Jan 2024 05:55 )             45.7   01-11    136  |  98  |  17.3  ----------------------------<  119<H>  3.7   |  29.0  |  1.25    Ca    9.2      11 Jan 2024 05:55  Mg     1.7     01-11    TPro  7.0  /  Alb  3.9  /  TBili  0.4  /  DBili  x   /  AST  21  /  ALT  21  /  AlkPhos  102  01-11  LIVER FUNCTIONS - ( 11 Jan 2024 05:55 )  Alb: 3.9 g/dL / Pro: 7.0 g/dL / ALK PHOS: 102 U/L / ALT: 21 U/L / AST: 21 U/L / GGT: x               Urinalysis Basic - ( 11 Jan 2024 05:55 )    Color: x / Appearance: x / SG: x / pH: x  Gluc: 119 mg/dL / Ketone: x  / Bili: x / Urobili: x   Blood: x / Protein: x / Nitrite: x   Leuk Esterase: x / RBC: x / WBC x   Sq Epi: x / Non Sq Epi: x / Bacteria: x        RADIOLOGY & ADDITIONAL STUDIES:      < from: Xray Chest 1 View- PORTABLE-Urgent (01.11.24 @ 06:49) >  IMPRESSION:   No evidence of active chest disease.    < end of copied text >        Assessment:    Pt is a 69 y/o male with PMHx significant for HTN, hyperlipidemia, GERD, formerly hyperthyroidism treated with methimazole now euthyroid, palpitations, and long RP SVT s/p AVNRT ablation with Dr. Thakkar on 3/16/22, who presented to [facility] early this morning with complaints of intermittent palpitations and chest tightness for 1 week. Pt states he was mopping the floor last week when he developed a tightness over the L side of his chest. Pt states the pain is exacerbated with movement and reports some relief with NSAIDs. Pt reports around the same time he started feeling more frequent palpitations lasting several seconds in duration which were self resolving. EP team has been consulted for the management of palpitations due to his Hx of AVNRT ablation.    Pt reports intermittent palpitations for the past several months and was recently evaluated by Dr. Sosa (Cards) who prescribed an MCOT monitor from Nov 14 - Dec 5th. The monitor revealed primarily sinus rhythm with intact conduction, avg HR 65 bpm, low of 45 bpm, and high of 140bpm. Pt also noted with a 5.8% PVC burden and 2.8% APC burden. No sustained arrythmias were appreciated. Pt was advised to c/w his Verapamil 240mg and he was planning to follow up with Dr. Thakkar. Pt reports no recent or remote syncopal events. Currently denies fever, chills, SOB, dizziness.      incomplete note [place] [facility]  [facility]   Office: [address] [place] [address]  Telephone: [phone] Fax:[phone]      HPI:    Pt is a 69 y/o male with PMHx significant for HTN, hyperlipidemia, GERD, formerly hyperthyroidism treated with methimazole now euthyroid, palpitations, and long RP SVT s/p AVNRT ablation with Dr. Thakkar on 3/16/22, who presented to [facility] early this morning with complaints of intermittent palpitations and chest tightness for 1 week. Pt states he was mopping the floor last week when he developed a tightness over the L side of his chest. Pt states the pain is exacerbated with movement and reports some relief with NSAIDs. Pt reports around the same time he started feeling more frequent palpitations lasting several seconds in duration which were self resolving. EP team has been consulted for the management of palpitations due to his Hx of AVNRT ablation.    Pt reports intermittent palpitations for the past several months and was recently evaluated by Dr. Sosa (Cards) who prescribed an MCOT monitor from  - Dec 5th. The monitor revealed primarily sinus rhythm with intact conduction, avg HR 65 bpm, low of 45 bpm, and high of 140bpm. Pt also noted with a 5.8% PVC burden and 2.8% APC burden. No sustained arrhythmias were appreciated. Pt was advised to c/w his Verapamil 240mg and he was planning to follow up with Dr. Thakkar. Pt reports no recent or remote syncopal events. Currently denies fever, chills, SOB, dizziness.    EK24: 0446: Sinus tachycardia vs AT with a VR of 124BPM.   24 1407: SR with intact conduction.       Cardiology Summary:   NST 21: Normal study; no evidence for myocardial infarction or ischemia.  TTE: 21: LVEF 60-65%        PAST MEDICAL & SURGICAL HISTORY:  GERD (gastroesophageal reflux disease)      High cholesterol      Palpitations      Hypertension      H/O supraventricular tachycardia      Hypothyroid  resolved - No longer on medications      At risk for sleep apnea  Bang score 4      History of arthroscopy of both shoulders          REVIEW OF SYSTEMS:    CONSTITUTIONAL: No fever, weight loss, or fatigue  NECK: No pain or stiffness  RESPIRATORY: No cough, wheezing, chills or hemoptysis; No shortness of breath  CARDIOVASCULAR: see HPI  GASTROINTESTINAL: No abdominal or epigastric pain. No nausea, vomiting, or hematemesis; No diarrhea or constipation. No melena or hematochezia.  NEUROLOGICAL: No headaches, memory loss, loss of strength, numbness, or tremors  SKIN: No itching, burning, rashes, or lesions   HEME/LYMPH: No easy bruising, or bleeding gums      MEDICATIONS  (STANDING):  amLODIPine   Tablet 5 milliGRAM(s) Oral daily  atorvastatin 10 milliGRAM(s) Oral at bedtime  carvedilol 6.25 milliGRAM(s) Oral every 12 hours  hydrochlorothiazide 25 milliGRAM(s) Oral daily  magnesium oxide 400 milliGRAM(s) Oral daily    MEDICATIONS  (PRN):      Allergies    lisinopril (Angioedema)    Intolerances        SOCIAL HISTORY:    Denies ETOH / Smoking / illicit drugs    FAMILY HISTORY:  Family history of coronary artery disease (Father, Mother)        Vital Signs Last 24 Hrs  T(C): 37.1 (2024 07:30), Max: 37.2 (2024 04:39)  T(F): 98.7 (2024 07:30), Max: 99 (2024 04:39)  HR: 93 (2024 07:30) (93 - 132)  BP: 173/84 (2024 07:30) (173/84 - 192/91)  BP(mean): --  RR: 18 (2024 07:30) (18 - 18)  SpO2: 98% (2024 07:30) (98% - 99%)    Parameters below as of 2024 04:39  Patient On (Oxygen Delivery Method): room air        Physical Exam:    Constitutional: Well-developed, well nourished, in no acute distress  Neck: supple, full range of motion, nontender to palpation midline  Chest wall: normal in appearance, nontender to palpation  Resp: effort normal, breath sounds clear to auscultation bilaterally  Cardiac: Heart regular rate and rhythm, no murmurs, rubs, or gallops.  No edema.  Abdomen: soft, nondistended, bowel sounds active, nontender to palpation in all four quadrants    Musculoskeletal: full range of motion all extremities with no pain, tenderness, swelling, or erythema    Neuro: Alert and oriented x 3, motor & sensation grossly in tact  Skin: color normal, no rashes or injury  Psych: mood calm       LABS:                        15.1   7.08  )-----------( 209      ( 2024 05:55 )             45.7       136  |  98  |  17.3  ----------------------------<  119<H>  3.7   |  29.0  |  1.25    Ca    9.2      2024 05:55  Mg     1.7         TPro  7.0  /  Alb  3.9  /  TBili  0.4  /  DBili  x   /  AST  21  /  ALT  21  /  AlkPhos  102    LIVER FUNCTIONS - ( 2024 05:55 )  Alb: 3.9 g/dL / Pro: 7.0 g/dL / ALK PHOS: 102 U/L / ALT: 21 U/L / AST: 21 U/L / GGT: x               Urinalysis Basic - ( 2024 05:55 )    Color: x / Appearance: x / SG: x / pH: x  Gluc: 119 mg/dL / Ketone: x  / Bili: x / Urobili: x   Blood: x / Protein: x / Nitrite: x   Leuk Esterase: x / RBC: x / WBC x   Sq Epi: x / Non Sq Epi: x / Bacteria: x        RADIOLOGY & ADDITIONAL STUDIES:      < from: Xray Chest 1 View- PORTABLE-Urgent (24 @ 06:49) >  IMPRESSION:   No evidence of active chest disease.    < end of copied text >        Assessment:    Pt is a 69 y/o male with PMHx significant for HTN, hyperlipidemia, GERD, formerly hyperthyroidism treated with methimazole now euthyroid, palpitations, and long RP SVT s/p AVNRT ablation with Dr. Thakkar on 3/16/22, who presented to [facility] early this morning with complaints of intermittent palpitations and chest tightness for 1 week. Pt states he was mopping the floor last week when he developed a tightness over the L side of his chest. Pt states the pain is exacerbated with movement and reports some relief with NSAIDs. Pt reports around the same time he started feeling more frequent palpitations lasting several seconds in duration which were self resolving. EP team has been consulted for the management of palpitations due to his Hx of AVNRT ablation.    Pt reports intermittent palpitations for the past several months and was recently evaluated by Dr. Sosa (Cards) who prescribed an MCOT monitor from  - Dec 5th. The monitor revealed primarily sinus rhythm with intact conduction, avg HR 65 bpm, low of 45 bpm, and high of 140bpm. Pt also noted with a 5.8% PVC burden and 2.8% APC burden. No sustained arrythmias were appreciated. Pt was advised to c/w his Verapamil 240mg and he was planning to follow up with Dr. Thkakar. Pt reports no recent or remote syncopal events. Currently denies fever, chills, SOB, dizziness.      Plan:  1) Palpitations   - EKG on arrival appears to be consistent with likely AT  - Pt currently sinus rhythm. Telemetry interrupted so unable to review timing of conversion  - TTE revealed hyperdynamic LV  function (EF >75 %)  - No evidence of ischemia on NST  - c/w Coreg 6.25 Q12HR  - Outpatient follow up with Dr. Thakkar in 2 -4 weeks    Above discussed with Dr. Hidalgo            [place] [facility]  [facility]   Office: [address] [place] [address]  Telephone: [phone] Fax:[phone]      HPI:    Pt is a 69 y/o male with PMHx significant for HTN, hyperlipidemia, GERD, formerly hyperthyroidism treated with methimazole now euthyroid, palpitations, and long RP SVT s/p AVNRT ablation with Dr. Thakkar on 3/16/22, who presented to [facility] early this morning with complaints of intermittent palpitations and chest tightness for 1 week. Pt states he was mopping the floor last week when he developed a tightness over the L side of his chest. Pt states the pain is exacerbated with movement and reports some relief with NSAIDs. Pt reports around the same time he started feeling more frequent palpitations lasting several seconds in duration which were self resolving. EP team has been consulted for the management of palpitations due to his Hx of AVNRT ablation.    Pt reports intermittent palpitations for the past several months and was recently evaluated by Dr. Sosa (Cards) who prescribed an MCOT monitor from  - Dec 5th. The monitor revealed primarily sinus rhythm with intact conduction, avg HR 65 bpm, low of 45 bpm, and high of 140bpm. Pt also noted with a 5.8% PVC burden and 2.8% APC burden. No sustained arrhythmias were appreciated. Pt was advised to c/w his Verapamil 240mg and he was planning to follow up with Dr. Thakkar. Pt reports no recent or remote syncopal events. Currently denies fever, chills, SOB, dizziness.    EK24: 0446: Sinus tachycardia vs AT with a VR of 124BPM.   24 1407: SR with intact conduction.       Cardiology Summary:   NST 21: Normal study; no evidence for myocardial infarction or ischemia.  TTE: 21: LVEF 60-65%        PAST MEDICAL & SURGICAL HISTORY:  GERD (gastroesophageal reflux disease)      High cholesterol      Palpitations      Hypertension      H/O supraventricular tachycardia      Hypothyroid  resolved - No longer on medications      At risk for sleep apnea  Bang score 4      History of arthroscopy of both shoulders          REVIEW OF SYSTEMS:    CONSTITUTIONAL: No fever, weight loss, or fatigue  NECK: No pain or stiffness  RESPIRATORY: No cough, wheezing, chills or hemoptysis; No shortness of breath  CARDIOVASCULAR: see HPI  GASTROINTESTINAL: No abdominal or epigastric pain. No nausea, vomiting, or hematemesis; No diarrhea or constipation. No melena or hematochezia.  NEUROLOGICAL: No headaches, memory loss, loss of strength, numbness, or tremors  SKIN: No itching, burning, rashes, or lesions   HEME/LYMPH: No easy bruising, or bleeding gums      MEDICATIONS  (STANDING):  amLODIPine   Tablet 5 milliGRAM(s) Oral daily  atorvastatin 10 milliGRAM(s) Oral at bedtime  carvedilol 6.25 milliGRAM(s) Oral every 12 hours  hydrochlorothiazide 25 milliGRAM(s) Oral daily  magnesium oxide 400 milliGRAM(s) Oral daily    MEDICATIONS  (PRN):      Allergies    lisinopril (Angioedema)    Intolerances        SOCIAL HISTORY:    Denies ETOH / Smoking / illicit drugs    FAMILY HISTORY:  Family history of coronary artery disease (Father, Mother)        Vital Signs Last 24 Hrs  T(C): 37.1 (2024 07:30), Max: 37.2 (2024 04:39)  T(F): 98.7 (2024 07:30), Max: 99 (2024 04:39)  HR: 93 (2024 07:30) (93 - 132)  BP: 173/84 (2024 07:30) (173/84 - 192/91)  BP(mean): --  RR: 18 (2024 07:30) (18 - 18)  SpO2: 98% (2024 07:30) (98% - 99%)    Parameters below as of 2024 04:39  Patient On (Oxygen Delivery Method): room air        Physical Exam:    Constitutional: Well-developed, well nourished, in no acute distress  Neck: supple, full range of motion, nontender to palpation midline  Chest wall: normal in appearance, nontender to palpation  Resp: effort normal, breath sounds clear to auscultation bilaterally  Cardiac: Heart regular rate and rhythm, no murmurs, rubs, or gallops.  No edema.  Abdomen: soft, nondistended, bowel sounds active, nontender to palpation in all four quadrants    Musculoskeletal: full range of motion all extremities with no pain, tenderness, swelling, or erythema    Neuro: Alert and oriented x 3, motor & sensation grossly in tact  Skin: color normal, no rashes or injury  Psych: mood calm       LABS:                        15.1   7.08  )-----------( 209      ( 2024 05:55 )             45.7       136  |  98  |  17.3  ----------------------------<  119<H>  3.7   |  29.0  |  1.25    Ca    9.2      2024 05:55  Mg     1.7         TPro  7.0  /  Alb  3.9  /  TBili  0.4  /  DBili  x   /  AST  21  /  ALT  21  /  AlkPhos  102    LIVER FUNCTIONS - ( 2024 05:55 )  Alb: 3.9 g/dL / Pro: 7.0 g/dL / ALK PHOS: 102 U/L / ALT: 21 U/L / AST: 21 U/L / GGT: x               Urinalysis Basic - ( 2024 05:55 )    Color: x / Appearance: x / SG: x / pH: x  Gluc: 119 mg/dL / Ketone: x  / Bili: x / Urobili: x   Blood: x / Protein: x / Nitrite: x   Leuk Esterase: x / RBC: x / WBC x   Sq Epi: x / Non Sq Epi: x / Bacteria: x        RADIOLOGY & ADDITIONAL STUDIES:      < from: Xray Chest 1 View- PORTABLE-Urgent (24 @ 06:49) >  IMPRESSION:   No evidence of active chest disease.    < end of copied text >        Assessment:    Pt is a 69 y/o male with PMHx significant for HTN, hyperlipidemia, GERD, formerly hyperthyroidism treated with methimazole now euthyroid, palpitations, and long RP SVT s/p AVNRT ablation with Dr. Thakkar on 3/16/22, who presented to [facility] early this morning with complaints of intermittent palpitations and chest tightness for 1 week. Pt states he was mopping the floor last week when he developed a tightness over the L side of his chest. Pt states the pain is exacerbated with movement and reports some relief with NSAIDs. Pt reports around the same time he started feeling more frequent palpitations lasting several seconds in duration which were self resolving. EP team has been consulted for the management of palpitations due to his Hx of AVNRT ablation.    Pt reports intermittent palpitations for the past several months and was recently evaluated by Dr. Sosa (Cards) who prescribed an MCOT monitor from  - Dec 5th. The monitor revealed primarily sinus rhythm with intact conduction, avg HR 65 bpm, low of 45 bpm, and high of 140bpm. Pt also noted with a 5.8% PVC burden and 2.8% APC burden. No sustained arrythmias were appreciated. Pt was advised to c/w his Verapamil 240mg and he was planning to follow up with Dr. Thakkar. Pt reports no recent or remote syncopal events. Currently denies fever, chills, SOB, dizziness.      Plan:  1) Palpitations   - EKG on arrival appears to be consistent with likely AT  - Pt currently sinus rhythm. Telemetry interrupted so unable to review timing of conversion  - TTE revealed hyperdynamic LV  function (EF >75 %)  - No evidence of ischemia on NST  - c/w Coreg 6.25 Q12HR  - Outpatient follow up with Dr. Thakkar in 2 -4 weeks    Above discussed with Dr. Hidalgo

## 2024-01-11 NOTE — ED ADULT NURSE NOTE - OBJECTIVE STATEMENT
Pt CO non radiating pectoral left sided chest pain that feels sharp beginning yesterday. Pt also CO HTN - denies HA, dizziness or visual changes. No additional CO pain or discomfort @ this time.

## 2024-01-11 NOTE — CONSULT NOTE ADULT - NS ATTEND AMEND GEN_ALL_CORE FT
Prior hx of SVT with ablation for AVNRT. Unclear if patient had AT today - one ECG appears to consistent with it based on change in P wave morphology. Currently in SR. Agree with beta-blockers. Outpatient follow up with Dr. Thakkar.
examined at bed side, feels better now, was having frequent palps and atypical cp. Also his BP was very high despite med compliance.   Tele S tach with PACs.  Will get echo and NST.  Will adjust his BP meds: Cont HCT, dc clonidine, substitute cardizem with amlodipine and add Coreg.  Will also get EP consult.

## 2024-01-11 NOTE — ED ADULT NURSE NOTE - NSFALLUNIVINTERV_ED_ALL_ED
Bed/Stretcher in lowest position, wheels locked, appropriate side rails in place/Call bell, personal items and telephone in reach/Instruct patient to call for assistance before getting out of bed/chair/stretcher/Non-slip footwear applied when patient is off stretcher/[place] to call system/Physically safe environment - no spills, clutter or unnecessary equipment/Purposeful proactive rounding/Room/bathroom lighting operational, light cord in reach Bed/Stretcher in lowest position, wheels locked, appropriate side rails in place/Call bell, personal items and telephone in reach/Instruct patient to call for assistance before getting out of bed/chair/stretcher/Non-slip footwear applied when patient is off stretcher/[place] to call system/Physically safe environment - no spills, clutter or unnecessary equipment/Purposeful proactive rounding/Room/bathroom lighting operational, light cord in reach

## 2024-01-11 NOTE — ED PROVIDER NOTE - OBJECTIVE STATEMENT
68-year-old male history of SVT status post ablation, hypertension, hyperlipidemia presenting with intermittent palpitations   associated with nonradiating left-sided chest discomfort x 4 days.   chest pain worst when twisting, does note that he was mopping the day before the pain starts and thought he may have pulled a muscle. Noted that he was hypertensive at home tonight, and tachycardic.  Denies shortness of breath, exertional pain,  Leg pain, leg swelling, recent travel, hormone use.

## 2024-01-11 NOTE — ED CDU PROVIDER INITIAL DAY NOTE - CLINICAL SUMMARY MEDICAL DECISION MAKING FREE TEXT BOX
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 Trop negative x2. Seen by Cards, recommend TTE and NST, patient placed in observation pending results of these studies and reassessment.

## 2024-01-11 NOTE — ED CDU PROVIDER INITIAL DAY NOTE - NS ED ROS FT
Constitutional: no fever, no chills  Head: NC, AT   Eyes: no redness   ENMT: no nasal congestion/drainage  CV: + chest pain, +palpitations, no edema  Resp: no cough, no dyspnea  GI: no abdominal pain, no nausea, no vomiting, no diarrhea  : no dysuria, no hematuria   Skin: no lesions, no rashes   Neuro: no LOC, no headache, no sensory deficits

## 2024-01-11 NOTE — ED CDU PROVIDER DISPOSITION NOTE - PATIENT PORTAL LINK FT
You can access the FollowMyHealth Patient Portal offered by [facility] by registering at the following website: [email or web address]. By joining [employer]’s FollowMyHealth portal, you will also be able to view your health information using other applications (apps) compatible with our system. You can access the FollowMyHealth Patient Portal offered by [facility] by registering at the following website: [email or web address]. By joining [employer]’s FollowMyHealth portal, you will also be able to view your health information using other applications (apps) compatible with our system.

## 2024-01-11 NOTE — ED PROVIDER NOTE - CLINICAL SUMMARY MEDICAL DECISION MAKING FREE TEXT BOX
62, male history of SVT status post ablation presenting with palpitations and chest discomfort.  Found to be tachycardic in the ED.  Plan for labs, cardiac enzymes, chest x-ray, aspirin, consider cardiology consult.

## 2024-01-11 NOTE — CONSULT NOTE ADULT - PROBLEM SELECTOR RECOMMENDATION 3
increase clonidine to 0.3mg tid  c/w chlorthalidone 25mg qd  increase k to 20meq qd  Cardizem 240mg qd   consider increasing to 360mg qd if ok with EP  Low na diet advised

## 2024-01-11 NOTE — ED CDU PROVIDER INITIAL DAY NOTE - OBSERVATION MONITORING PLAN
----- Message from Inga Olivera sent at 9/11/2019  8:59 AM CDT -----  Contact: Son/Isma  Type: Needs Medical Advice    Who Called:  Isma  Symptoms (please be specific):  na  How long has patient had these symptoms:  na  Pharmacy name and phone #:  [email or web address]  Best Call Back Number: [phone] call first  [phone]  Additional Information: Isma states he can not bring patient to apt. today he is cancelling but Isma would like to reschedule for next Wednesday at the same time.  Please call to advise and schedule. Thanks!     ED Record Reviewed

## 2024-01-11 NOTE — CONSULT NOTE ADULT - PROBLEM SELECTOR RECOMMENDATION 9
in patient with with svt s/p ablation  EP consult  TSH  normal   Replace k to keep k >4 and mg >2  increase Cardizem if ok with EP

## 2024-01-14 ENCOUNTER — EMERGENCY (EMERGENCY)
Facility: HOSPITAL | Age: 69
LOS: 1 days | Discharge: HOME | End: 2024-01-14
Attending: STUDENT IN AN ORGANIZED HEALTH CARE EDUCATION/TRAINING PROGRAM
Payer: COMMERCIAL

## 2024-01-14 VITALS
TEMPERATURE: 98 F | RESPIRATION RATE: 20 BRPM | DIASTOLIC BLOOD PRESSURE: 90 MMHG | OXYGEN SATURATION: 99 % | SYSTOLIC BLOOD PRESSURE: 186 MMHG | HEART RATE: 90 BPM

## 2024-01-14 VITALS
WEIGHT: 185.41 LBS | HEIGHT: 69 IN | SYSTOLIC BLOOD PRESSURE: 190 MMHG | TEMPERATURE: 99 F | OXYGEN SATURATION: 99 % | RESPIRATION RATE: 20 BRPM | HEART RATE: 115 BPM | DIASTOLIC BLOOD PRESSURE: 94 MMHG

## 2024-01-14 DIAGNOSIS — Z98.890 OTHER SPECIFIED POSTPROCEDURAL STATES: Chronic | ICD-10-CM

## 2024-01-14 PROCEDURE — 99283 EMERGENCY DEPT VISIT LOW MDM: CPT

## 2024-01-14 PROCEDURE — 99282 EMERGENCY DEPT VISIT SF MDM: CPT

## 2024-01-14 NOTE — ED PROVIDER NOTE - NSFOLLOWUPINSTRUCTIONS_ED_ALL_ED_FT
- Please follow-up with your cardiologist and primary care doctor for further blood pressure management.   - SEEK IMMEDIATE MEDICAL CARE IF YOU HAVE ANY OF THE FOLLOWING SYMPTOMS: severe headache, confusion, chest pain, abdominal pain, vomiting, or shortness of breath.    Hypertension    Hypertension, commonly called high blood pressure, is when the force of blood pumping through your arteries is too strong. Hypertension forces your heart to work harder to pump blood. Your arteries may become narrow or stiff. Having untreated or uncontrolled hypertension for a long period of time can cause heart attack, stroke, kidney disease, and other problems. If started on a medication, take exactly as prescribed by your health care professional. Maintain a healthy lifestyle and follow up with your primary care physician.

## 2024-01-14 NOTE — ED ADULT TRIAGE NOTE - CHIEF COMPLAINT QUOTE
pt states he had his BP medication adjusted a few days ago but was still elevated when he took it at home. pt denies chest pain or headache.

## 2024-01-14 NOTE — ED PROVIDER NOTE - PATIENT PORTAL LINK FT
You can access the FollowMyHealth Patient Portal offered by [facility] by registering at the following website: [email or web address]. By joining [employer]’s FollowMyHealth portal, you will also be able to view your health information using other applications (apps) compatible with our system. You can access the FollowMyHealth Patient Portal offered by [facility] by registering at the following website: [email or web address]. By joining [employer]’s FollowMyHealth portal, you will also be able to view your health information using other applications (apps) compatible with our system. You can access the FollowMyHealth Patient Portal offered by [facility] by registering at the following website: [email or web address]. By joining [employer]’s FollowMyHealth portal, you will also be able to view your health information using other applications (apps) compatible with our system. You can access the FollowMyHealth Patient Portal offered by Health system by registering at the following website: [email or web address]. By joining [employer]’s FollowMyHealth portal, you will also be able to view your health information using other applications (apps) compatible with our system.

## 2024-01-14 NOTE — ED PROVIDER NOTE - CLINICAL SUMMARY MEDICAL DECISION MAKING FREE TEXT BOX
88-year-old male with history of SVT  status post ablation, HTN, HLD presenting with hypertension.   Patient is overall well-appearing without signs of acute distress.  He is coming in for asymptomatic hypertension.  Physical exam is benign.  I explained to him the importance of following his blood pressure over long-term and keeping a blood pressure diary at home and that sporadic increases in blood pressure do not necessarily warrant IV medications to control blood pressure.  He will follow-up with his primary care doctor on Tuesday and cardiology soon after.  He is currently medically stable for discharge at this time.  Strict return precautions given.

## 2024-01-14 NOTE — ED ADULT NURSE NOTE - NSFALLUNIVINTERV_ED_ALL_ED
Bed/Stretcher in lowest position, wheels locked, appropriate side rails in place/Call bell, personal items and telephone in reach/Instruct patient to call for assistance before getting out of bed/chair/stretcher/Non-slip footwear applied when patient is off stretcher/[place] to call system/Physically safe environment - no spills, clutter or unnecessary equipment/Purposeful proactive rounding/Room/bathroom lighting operational, light cord in reach Bed/Stretcher in lowest position, wheels locked, appropriate side rails in place/Call bell, personal items and telephone in reach/Instruct patient to call for assistance before getting out of bed/chair/stretcher/Non-slip footwear applied when patient is off stretcher/[place] to call system/Physically safe environment - no spills, clutter or unnecessary equipment/Purposeful proactive rounding/Room/bathroom lighting operational, light cord in reach

## 2024-01-14 NOTE — ED PROVIDER NOTE - OBJECTIVE STATEMENT
88-year-old male with history of SVT  status post ablation, HTN, HLD presenting with hypertension.  Patient came into the emergency department today because he noted blood pressure to be high 100.  He reports that he has been taking his medication as prescribed since leaving the hospital a few days ago and being seen by cardiology.  He reports that he was prescribed an additional medication amlodipine 5 mg for his blood pressure.  He denies headache, vomiting, chest pain, shortness of breath, dyspnea on exertion, leg swelling leaving the hospital.   His labs are reassuring he was instructed to follow-up with cardiology in the office after having a nuclear stress test and echocardiogram. 68-year-old male with history of SVT  status post ablation, HTN, HLD presenting with hypertension.  Patient came into the emergency department today because he noted blood pressure to be high 100.  He reports that he has been taking his medication as prescribed since leaving the hospital a few days ago and being seen by cardiology.  He reports that he was prescribed an additional medication amlodipine 5 mg for his blood pressure.  He denies headache, vomiting, chest pain, shortness of breath, dyspnea on exertion, leg swelling leaving the hospital.   His labs are reassuring he was instructed to follow-up with cardiology in the office after having a nuclear stress test and echocardiogram.

## 2024-01-14 NOTE — ED ADULT NURSE NOTE - OBJECTIVE STATEMENT
A&Ox4, Presents to ed with complaints of hypertension, patient reports hx of hypertension, was here few days ago and has had bp medications adjusted, Reports having high blood pressure at home today. Denies chest pain, nausea, headache, fever, chills.

## 2024-01-14 NOTE — ED PROVIDER NOTE - PHYSICAL EXAMINATION
Gen: no acute distress  Head: normocephalic, atraumatic  EENT: EOMI  Lung: no increased work of breathing, CTABL, no wheezing, rales, rhonchi  CV: normal s1/s2, rrr, 2+ radial pulses b/l, no LE edema  MSK: no visible deformities, full range of motion in all 4 extremities  Neuro: A&Ox4; No focal neurologic deficits

## 2024-05-14 ENCOUNTER — APPOINTMENT (OUTPATIENT)
Dept: ELECTROPHYSIOLOGY | Facility: CLINIC | Age: 69
End: 2024-05-14
Payer: MEDICARE

## 2024-05-14 VITALS
TEMPERATURE: 98.6 F | OXYGEN SATURATION: 96 % | BODY MASS INDEX: 27.25 KG/M2 | SYSTOLIC BLOOD PRESSURE: 122 MMHG | HEIGHT: 69 IN | HEART RATE: 53 BPM | WEIGHT: 184 LBS | DIASTOLIC BLOOD PRESSURE: 68 MMHG

## 2024-05-14 DIAGNOSIS — Z98.890 OTHER SPECIFIED POSTPROCEDURAL STATES: ICD-10-CM

## 2024-05-14 DIAGNOSIS — Z86.79 OTHER SPECIFIED POSTPROCEDURAL STATES: ICD-10-CM

## 2024-05-14 DIAGNOSIS — I47.10 SUPRAVENTRICULAR TACHYCARDIA, UNSPECIFIED: ICD-10-CM

## 2024-05-14 PROCEDURE — 93000 ELECTROCARDIOGRAM COMPLETE: CPT

## 2024-05-14 PROCEDURE — G2211 COMPLEX E/M VISIT ADD ON: CPT

## 2024-05-14 PROCEDURE — 99214 OFFICE O/P EST MOD 30 MIN: CPT

## 2024-05-14 RX ORDER — CARVEDILOL 3.12 MG/1
3.12 TABLET, FILM COATED ORAL TWICE DAILY
Refills: 0 | Status: ACTIVE | COMMUNITY

## 2024-05-14 RX ORDER — CLONIDINE HYDROCHLORIDE 0.2 MG/1
0.2 TABLET ORAL TWICE DAILY
Refills: 0 | Status: DISCONTINUED | COMMUNITY
End: 2024-05-14

## 2024-05-14 RX ORDER — LOSARTAN POTASSIUM 50 MG/1
50 TABLET, FILM COATED ORAL DAILY
Refills: 0 | Status: ACTIVE | COMMUNITY

## 2024-05-14 NOTE — PHYSICAL EXAM
[Normal S1, S2] : normal S1, S2 [No Murmur] : no murmur [Clear Lung Fields] : clear lung fields [Well Developed] : well developed [Well Nourished] : well nourished [No Acute Distress] : no acute distress [Normal Conjunctiva] : normal conjunctiva [No Respiratory Distress] : no respiratory distress  [Normal Gait] : normal gait [No Edema] : no edema [Moves all extremities] : moves all extremities [No Focal Deficits] : no focal deficits [Alert and Oriented] : alert and oriented

## 2024-05-14 NOTE — PHYSICAL EXAM
LVM requesting a call back for an appt. One more attempt will be made.     Debra Larry        [Normal S1, S2] : normal S1, S2 [No Murmur] : no murmur [Clear Lung Fields] : clear lung fields [Well Developed] : well developed [Well Nourished] : well nourished [No Acute Distress] : no acute distress [Normal Conjunctiva] : normal conjunctiva [No Respiratory Distress] : no respiratory distress  [Normal Gait] : normal gait [No Edema] : no edema [Moves all extremities] : moves all extremities [No Focal Deficits] : no focal deficits [Alert and Oriented] : alert and oriented

## 2024-05-16 NOTE — REASON FOR VISIT
[Spouse] : spouse [Arrhythmia/ECG Abnorrmalities] : arrhythmia/ECG abnormalities [FreeTextEntry3] : Dr. Sheehan

## 2024-05-16 NOTE — CARDIOLOGY SUMMARY
[de-identified] : 1/11/24 Regadenoson NST: Normal myocardial perfusion scan, with no evidence of infarction or inducible ischemia. 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 [de-identified] : 2/1/22: Sinus rhythm, normal axis and intervals [de-identified] : TTE 1/11/24: 1. Left ventricular cavity is normal. Left ventricular wall thickness is normal. Left ventricular systolic function is hyperdynamic with an ejection fraction visually estimated at >75 %.  2. Normal left ventricular diastolic function.  3. Normal right ventricular cavity size and normal systolic function.  4. The left atrium is normal.  5. The right atrium is normal in size.  6. Trace mitral regurgitation.  7. Tricuspid aortic valve with normal leaflet excursion with normal systolic excursion.  8. Trace tricuspid regurgitation. Pulmonary artery systolic pressure could not be estimated.  9. No pericardial effusion seen.  [de-identified] : MCOT monitor from Nov 14 - Dec 5th (Dr Sosa). The monitor revealed primarily sinus rhythm with intact conduction, avg HR 65 bpm, low of 45 bpm, and high of 140bpm. Pt also noted with a 5.8% PVC burden and 2.8% APC burden. No sustained arrhythmias were appreciated

## 2024-05-16 NOTE — CARDIOLOGY SUMMARY
[de-identified] : 1/11/24 Regadenoson NST: Normal myocardial perfusion scan, with no evidence of infarction or inducible ischemia. 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 [de-identified] : 2/1/22: Sinus rhythm, normal axis and intervals [de-identified] : TTE 1/11/24: 1. Left ventricular cavity is normal. Left ventricular wall thickness is normal. Left ventricular systolic function is hyperdynamic with an ejection fraction visually estimated at >75 %.  2. Normal left ventricular diastolic function.  3. Normal right ventricular cavity size and normal systolic function.  4. The left atrium is normal.  5. The right atrium is normal in size.  6. Trace mitral regurgitation.  7. Tricuspid aortic valve with normal leaflet excursion with normal systolic excursion.  8. Trace tricuspid regurgitation. Pulmonary artery systolic pressure could not be estimated.  9. No pericardial effusion seen.  [de-identified] : MCOT monitor from Nov 14 - Dec 5th (Dr Sosa). The monitor revealed primarily sinus rhythm with intact conduction, avg HR 65 bpm, low of 45 bpm, and high of 140bpm. Pt also noted with a 5.8% PVC burden and 2.8% APC burden. No sustained arrhythmias were appreciated

## 2024-05-16 NOTE — DISCUSSION/SUMMARY
[EKG obtained to assist in diagnosis and management of assessed problem(s)] : EKG obtained to assist in diagnosis and management of assessed problem(s) [FreeTextEntry1] : DUPLICATE NOTE

## 2024-09-06 NOTE — ED ADULT TRIAGE NOTE - NS ED NURSE BANDS TYPE
Results relayed to patient. Nothing further needed at this time.     Nurse visit scheduled for review of glucose log    Name band;

## 2024-11-12 ENCOUNTER — APPOINTMENT (OUTPATIENT)
Dept: ELECTROPHYSIOLOGY | Facility: CLINIC | Age: 69
End: 2024-11-12

## 2025-02-10 NOTE — ASU PATIENT PROFILE, ADULT - IS PATIENT PREGNANT?
End of Shift Note    Bedside shift change report given to Lori (oncoming nurse) by Oneida Avitia RN (offgoing nurse).  Report included the following information SBAR    Shift worked:  7 am- 7 pm      Shift summary and any significant changes:     Pt remains refusing bipap and bedside commode     Concerns for physician to address:  -     Zone phone for oncoming shift:   [phone]       Activity:  Level of Assistance: Moderate assist, patient does 50-74%  Number times ambulated in hallways past shift: 0  Number of times OOB to chair past shift: 1    Cardiac:   Cardiac Monitoring: Yes      Cardiac Rhythm: Sinus rhythm    Access:  Current line(s): PIV    Genitourinary:   Urinary Status: Voiding    Respiratory:   O2 Device: Nasal cannula  Chronic home O2 use?: YES  Incentive spirometer at bedside: N/A    GI:  Last BM (including prior to admit): 02/07/25  Current diet:  ADULT ORAL NUTRITION SUPPLEMENT; Breakfast, Lunch, Dinner; Standard High Calorie/High Protein Oral Supplement  ADULT DIET; Regular; No Added Salt (3-4 gm); strawberry ensure please  Passing flatus: YES    Pain Management:   Patient states pain is manageable on current regimen: YES    Skin:  Brennen Scale Score: 19  Interventions: Wound Offloading (Prevention Methods): Bed, pressure redistribution/air, Repositioning    Patient Safety:  Fall Risk: Nursing Judgement-Fall Risk High(Add Comments): Yes  Fall Risk Interventions  Nursing Judgement-Fall Risk High(Add Comments): Yes  Toilet Every 2 Hours-In Advance of Need: Yes  Hourly Visual Checks: Awake, In bed  Fall Visual Posted: Fall sign posted  Room Door Open: Deferred to decrease stimulation  Alarm On: Bed, Chair  Patient Moved Closer to Nursing Station: No    Active Consults:   IP CONSULT TO PSYCHIATRY  IP CONSULT TO CASE MANAGEMENT  IP CONSULT TO PULMONOLOGY    Length of Stay:  Expected LOS: 7  Actual LOS: 6    Oneida Avitia RN       not applicable (Male)

## 2025-03-18 ENCOUNTER — NON-APPOINTMENT (OUTPATIENT)
Age: 70
End: 2025-03-18

## 2025-03-18 ENCOUNTER — APPOINTMENT (OUTPATIENT)
Dept: ELECTROPHYSIOLOGY | Facility: CLINIC | Age: 70
End: 2025-03-18
Payer: MEDICARE

## 2025-03-18 VITALS
OXYGEN SATURATION: 97 % | SYSTOLIC BLOOD PRESSURE: 122 MMHG | HEART RATE: 77 BPM | BODY MASS INDEX: 26.96 KG/M2 | WEIGHT: 182 LBS | HEIGHT: 69 IN | DIASTOLIC BLOOD PRESSURE: 64 MMHG

## 2025-03-18 DIAGNOSIS — I47.10 SUPRAVENTRICULAR TACHYCARDIA, UNSPECIFIED: ICD-10-CM

## 2025-03-18 DIAGNOSIS — I47.29 OTHER VENTRICULAR TACHYCARDIA: ICD-10-CM

## 2025-03-18 DIAGNOSIS — Z98.890 OTHER SPECIFIED POSTPROCEDURAL STATES: ICD-10-CM

## 2025-03-18 DIAGNOSIS — Z86.79 OTHER SPECIFIED POSTPROCEDURAL STATES: ICD-10-CM

## 2025-03-18 DIAGNOSIS — R00.2 PALPITATIONS: ICD-10-CM

## 2025-03-18 PROCEDURE — 93000 ELECTROCARDIOGRAM COMPLETE: CPT | Mod: 59

## 2025-03-18 PROCEDURE — 99214 OFFICE O/P EST MOD 30 MIN: CPT | Mod: 25

## 2025-04-01 ENCOUNTER — APPOINTMENT (OUTPATIENT)
Dept: CARDIOLOGY | Facility: CLINIC | Age: 70
End: 2025-04-01

## 2025-04-01 PROCEDURE — 93246 EXT ECG>7D<15D RECORDING: CPT

## 2025-05-06 ENCOUNTER — APPOINTMENT (OUTPATIENT)
Dept: ELECTROPHYSIOLOGY | Facility: CLINIC | Age: 70
End: 2025-05-06
Payer: MEDICARE

## 2025-05-06 VITALS
WEIGHT: 192 LBS | OXYGEN SATURATION: 98 % | SYSTOLIC BLOOD PRESSURE: 136 MMHG | BODY MASS INDEX: 28.44 KG/M2 | HEIGHT: 69 IN | DIASTOLIC BLOOD PRESSURE: 76 MMHG | HEART RATE: 76 BPM

## 2025-05-06 DIAGNOSIS — R00.2 PALPITATIONS: ICD-10-CM

## 2025-05-06 DIAGNOSIS — Z98.890 OTHER SPECIFIED POSTPROCEDURAL STATES: ICD-10-CM

## 2025-05-06 DIAGNOSIS — I47.10 SUPRAVENTRICULAR TACHYCARDIA, UNSPECIFIED: ICD-10-CM

## 2025-05-06 DIAGNOSIS — Z86.79 OTHER SPECIFIED POSTPROCEDURAL STATES: ICD-10-CM

## 2025-05-06 PROCEDURE — 93000 ELECTROCARDIOGRAM COMPLETE: CPT

## 2025-05-06 PROCEDURE — 99214 OFFICE O/P EST MOD 30 MIN: CPT | Mod: 25

## 2025-06-16 NOTE — ASU PATIENT PROFILE, ADULT - PRO INTERPRETER NEED 2
SUBJECTIVE:   Mr. Corbin Landeros is a 83 y.o. male who is here for follow up of routine medical issues.        Pt is accompanied by his wife.    Pt was seen in the ED on 06/11/2025 for chest pain and headache for 3 days.  He described the chest discomfort as a burning sensation. Pt had labs and imaging which all came back normal.  Pt states he has some fatigue today, he insists his energy is improving. He is still having the chest discomfort. He wakes up in the morning with the discomfort. He was prescribed Pepcid and has been compliant in taking it he states it has helped some. He denies eating food acidic but admits to drinking coffee and multiple pepsi throughout the day.    Pt mentions he has difficulty with using his hands, he states that his nerves are bad. His wife mentions he has difficulty remembering and has nervous breakdown at times, pt denies any problems with his memory or feeling agitated. He admits to his tolerance for stress is limited. Pt states his mood is stable. Pt goes to bed around 8-9 pm and usually stays asleep.    HTN: Patient is compliant in taking amlodipine 10 mg daily. Their BP today was 138/69.     HLD: Pt is compliant in taking atorvastatin 10 mg nightly. Their last lipid panel elevated  and decreased HDL 39 on 03/04/2025.     DM2: Their last HbA1c was 6.4 on 03/04/2025.        At this time, he is otherwise doing well and has brought no other complaints to my attention today.  For a list of the medical issues addressed today, see the assessment and plan below.    PMH:   Past Medical History:   Diagnosis Date    Cancer (HCC) 2012    right lung SCC stage I    Dyspepsia and other specified disorders of function of stomach     Essential hypertension     GERD (gastroesophageal reflux disease)     Hypercholesterolemia     Hyperlipidemia     Prediabetes 8/26/2015    Pseudogout 8/6/2012    Squamous cell carcinoma lung (HCC)      PSH:  has a past surgical history that includes  English

## 2025-06-25 NOTE — ED ADULT NURSE NOTE - CAS TRG GEN SKIN CONDITION
Detail Level: Simple Render Risk Assessment In Note?: no Additional Notes: Pt stated that his insurance no longer covered Dupixent injections, so he discontinued. He is not interested in restarting an injectable therapy at this time but possibly in the future if uncontrolled on topicals. Discussed taking a daily allergy medication daily such as Claritin, bleach baths, and moisturizing daily. Warm/Dry